# Patient Record
Sex: MALE | Race: WHITE | NOT HISPANIC OR LATINO | Employment: OTHER | URBAN - METROPOLITAN AREA
[De-identification: names, ages, dates, MRNs, and addresses within clinical notes are randomized per-mention and may not be internally consistent; named-entity substitution may affect disease eponyms.]

---

## 2017-12-03 ENCOUNTER — HOSPITAL ENCOUNTER (EMERGENCY)
Facility: HOSPITAL | Age: 82
Discharge: HOME | End: 2017-12-03
Attending: FAMILY MEDICINE | Admitting: FAMILY MEDICINE

## 2017-12-03 VITALS
WEIGHT: 155 LBS | DIASTOLIC BLOOD PRESSURE: 80 MMHG | SYSTOLIC BLOOD PRESSURE: 154 MMHG | HEIGHT: 69 IN | BODY MASS INDEX: 22.96 KG/M2 | RESPIRATION RATE: 20 BRPM | OXYGEN SATURATION: 98 % | TEMPERATURE: 98.1 F | HEART RATE: 87 BPM

## 2017-12-03 DIAGNOSIS — Z43.0 ATTENTION TO TRACHEOSTOMY TUBE (HCC): Primary | ICD-10-CM

## 2017-12-03 PROCEDURE — 99284 EMERGENCY DEPT VISIT MOD MDM: CPT

## 2017-12-03 PROCEDURE — 94799 UNLISTED PULMONARY SVC/PX: CPT

## 2017-12-03 RX ORDER — MIDODRINE HYDROCHLORIDE 5 MG/1
10 TABLET ORAL 3 TIMES DAILY
COMMUNITY
End: 2018-09-14

## 2017-12-03 RX ORDER — LEVETIRACETAM 750 MG/1
1500 TABLET ORAL 2 TIMES DAILY
COMMUNITY

## 2017-12-03 RX ORDER — LEVOFLOXACIN 750 MG/1
750 TABLET ORAL DAILY
COMMUNITY
End: 2017-12-30 | Stop reason: HOSPADM

## 2017-12-03 RX ORDER — LEVOTHYROXINE SODIUM 0.07 MG/1
75 TABLET ORAL DAILY
COMMUNITY

## 2017-12-03 RX ORDER — OXYBUTYNIN CHLORIDE 5 MG/1
5 TABLET ORAL 2 TIMES DAILY
COMMUNITY
End: 2018-09-14

## 2017-12-03 RX ORDER — CLINDAMYCIN HYDROCHLORIDE 150 MG/1
150 CAPSULE ORAL 3 TIMES DAILY
COMMUNITY
End: 2017-12-30 | Stop reason: HOSPADM

## 2017-12-03 RX ORDER — SODIUM PHOSPHATE, DIBASIC AND SODIUM PHOSPHATE, MONOBASIC 7; 19 G/133ML; G/133ML
ENEMA RECTAL ONCE
COMMUNITY
End: 2017-12-30 | Stop reason: HOSPADM

## 2017-12-03 RX ORDER — FINASTERIDE 5 MG/1
5 TABLET, FILM COATED ORAL DAILY
COMMUNITY

## 2017-12-03 RX ORDER — LACOSAMIDE 50 MG/1
100 TABLET ORAL EVERY 12 HOURS SCHEDULED
COMMUNITY

## 2017-12-03 RX ORDER — ATORVASTATIN CALCIUM 40 MG/1
40 TABLET, FILM COATED ORAL DAILY
COMMUNITY

## 2017-12-03 NOTE — ED PROVIDER NOTES
The patient presents from [facility] with complaints of SOA and removal of his endotracheal tube.     Physical Exam:  Pulmonary: Mild respiratory distress with good O2 saturation. The inner cannula of the pt's trach will not re-thread.    1409  Replaced the pt's ET tube. Cleared hairs that had been interfering with the pt's ET tube.      Plan to replace the pt's trach tube and d/c him to [facility].     I supervised care provided by the midlevel provider.  We have discussed this patient's history, physical exam, and treatment plan.  I have reviewed the note and personally saw and examined the patient and agree with the plan of care.    Documentation assistance provided by dirk Alexis for Dr. Menjivar.  Information recorded by the scribe was done at my direction and has been verified and validated by me.     Arron Alexis  12/03/17 2853       Mike Menjivar MD  12/03/17 9971

## 2017-12-03 NOTE — PROGRESS NOTES
Patients trach was changed to a new no. 6 uncuffed, un fenestrated et tube with DR Menjivar with out any problems, patient trach had a mucus blood clot. Shakira Schafer RRT

## 2017-12-03 NOTE — ED NOTES
Attempted to call report to [facility] x3. No answer. Message left. Pt has been D/C.      Cat Rollins RN  12/03/17 1649

## 2017-12-03 NOTE — ED PROVIDER NOTES
EMERGENCY DEPARTMENT ENCOUNTER    CHIEF COMPLAINT  Chief Complaint: tracheostomy problem  History given by: patient, EMS  History limited by: N/A  Room Number: 17/17  PMD: No Known Provider      HPI:  Pt is a 87 y.o. male who presents with tracheostomy problem. EMS reports that staff at [facility] did not have the proper inner cannula when they were attempting to clear pt's trach. Consequently, [facility] referred pt to ED for further evaluation. In ED, pt c/o mild dyspnea. He denies chest pain, abd pain, fevers, and N/V/D. At baseline, he is always on 6L supplemental O2. Past Medical History of HTN, CAD, atrial fibrillation, and diabetes.     Duration: today  Timing: constant  Location: respiratory   Radiation: none  Quality: none   Intensity/Severity: moderate  Progression: unchanged   Associated Symptoms: mild dyspnea  Aggravating Factors: none  Alleviating Factors: none  Previous Episodes: none mentioned   Treatment before arrival: none mentioned    PAST MEDICAL HISTORY  Active Ambulatory Problems     Diagnosis Date Noted   • No Active Ambulatory Problems     Resolved Ambulatory Problems     Diagnosis Date Noted   • No Resolved Ambulatory Problems     Past Medical History:   Diagnosis Date   • Atrial fibrillation    • BPH (benign prostatic hyperplasia)    • Constipation    • Coronary artery disease    • Diabetes mellitus    • Disease of thyroid gland    • Hyperlipidemia    • Hypertension    • MRSA infection    • Pain    • Pressure ulcer of sacral region    • Seizures        PAST SURGICAL HISTORY  History reviewed. No pertinent surgical history.    FAMILY HISTORY  History reviewed. No pertinent family history.    SOCIAL HISTORY  Social History     Social History   • Marital status:      Spouse name: N/A   • Number of children: N/A   • Years of education: N/A     Occupational History   • Not on file.     Social History Main Topics   • Smoking status: Former Smoker   • Smokeless tobacco: Not on file    • Alcohol use No   • Drug use: No   • Sexual activity: Not on file     Other Topics Concern   • Not on file     Social History Narrative   • No narrative on file         ALLERGIES  Review of patient's allergies indicates no known allergies.    REVIEW OF SYSTEMS  Review of Systems   Constitutional: Negative for chills and fever.   HENT: Negative for sore throat.    Respiratory: Positive for shortness of breath (mild).         Tracheostomy problem ([facility] does not have proper inner cannula)   Cardiovascular: Negative for chest pain.   Gastrointestinal: Negative for diarrhea, nausea and vomiting.   Genitourinary: Negative for difficulty urinating and dysuria.   Musculoskeletal: Negative for back pain.   Skin: Negative for rash.   Psychiatric/Behavioral: The patient is not nervous/anxious.        PHYSICAL EXAM  ED Triage Vitals   Temp Heart Rate Resp BP SpO2   12/03/17 1317 12/03/17 1317 12/03/17 1324 12/03/17 1317 12/03/17 1317   98.1 °F (36.7 °C) 90 20 154/80 95 % WNL       Physical Exam   Constitutional: He is oriented to person, place, and time. No distress.   Chronically ill appearing   HENT:   Head: Atraumatic.   Mouth/Throat: Mucous membranes are normal.   Eyes: No scleral icterus.   Neck: Normal range of motion.   Cardiovascular: Normal rate, regular rhythm and normal heart sounds.    Pulmonary/Chest: Effort normal. No respiratory distress. He has rhonchi.   Tracheostomy in place   Abdominal: Soft. There is no tenderness. There is no rebound and no guarding.   Musculoskeletal: Normal range of motion.   Neurological: He is alert and oriented to person, place, and time.   Skin: Skin is warm and dry.   Psychiatric: Mood and affect normal.   Nursing note and vitals reviewed.      PROGRESS AND CONSULTS  1:53 PM- Reviewed pt's history and workup with Dr. Menjivar.  At bedside evaluation, they agree with the plan of care.  1:57 PM- Dr Menjivar will replace pt's tracheostomy.   3:10 PM- Dr Menjivar has replaced pt's  "tracheostomy. See Dr Menjivar's note for further details.   3:12 PM- Reviewed implications of results, diagnosis, meds, responsibility to follow up, warning signs and symptoms of possible worsening, potential complications and reasons to return to ER with patient.  Discussed all results and noted any abnormalities with patient.  Discussed absolute need to recheck abnormalities and condition with PMD. Counseled pt on the importance of having his tracheostomy tube cleaned daily.   Discussed plan for discharge, as there is no emergent indication for admission.  Pt is agreeable and understands need for follow up and repeat testing.  Pt is aware that discharge does not mean that nothing is wrong but it indicates no emergency is present.  Pt is discharged with instructions to follow up with primary care doctor to have their blood pressure rechecked.       DIAGNOSIS  Final diagnoses:   Attention to tracheostomy tube with replacement       FOLLOW UP   your pmd    Call in 1 day          MEDICATIONS GIVEN IN ER  Medications - No data to display    /80  Pulse 87  Temp 98.1 °F (36.7 °C) (Oral)   Resp 20  Ht 69\" (175.3 cm)  Wt 155 lb (70.3 kg)  SpO2 98%  BMI 22.89 kg/m2      I personally reviewed the past medical history, past surgical history, social history, family history, current medications and allergies as they appear in this chart.  The scribe's note accurately reflects the work and decisions made by me.     Documentation assistance provided by dirk Castaneda for MARY Anaya on 12/3/2017 at 3:25 PM. Information recorded by the scribaltaf was done at my direction and has been verified and validated by me.     Syd Castaneda  12/03/17 1535       MARK Conrad  12/03/17 1655    "

## 2017-12-03 NOTE — ED TRIAGE NOTES
"Pt to Ed from [facility] with c/o mild SOA. Per EMS [facility] did not have the correct inner cannula when attempting to clear trach. Pt is AAox3 and in NAD. Resp even and unlabored. Skin PWD and intact. Resp even and unlabored. Pt wears O2 @ 6 LPM to reach \"therapeutic range.\" report to Cat GANDHI.  "

## 2017-12-03 NOTE — DISCHARGE INSTRUCTIONS
Continue current home medications  Clean trach daily-this is very important  Follow up with your pmd in 3-5 days   Return to er for fever, chills, shortness of air, chest pain, or any new or worsening symptoms

## 2017-12-28 ENCOUNTER — HOSPITAL ENCOUNTER (OUTPATIENT)
Facility: HOSPITAL | Age: 82
Setting detail: OBSERVATION
Discharge: NURSING, REHAB OR LONG-TERM CARE | End: 2017-12-30
Attending: EMERGENCY MEDICINE | Admitting: HOSPITALIST

## 2017-12-28 ENCOUNTER — APPOINTMENT (OUTPATIENT)
Dept: GENERAL RADIOLOGY | Facility: HOSPITAL | Age: 82
End: 2017-12-28

## 2017-12-28 ENCOUNTER — APPOINTMENT (OUTPATIENT)
Dept: CT IMAGING | Facility: HOSPITAL | Age: 82
End: 2017-12-28

## 2017-12-28 DIAGNOSIS — R53.1 GENERALIZED WEAKNESS: ICD-10-CM

## 2017-12-28 DIAGNOSIS — A04.72 C. DIFFICILE COLITIS: ICD-10-CM

## 2017-12-28 DIAGNOSIS — R07.9 CHEST PAIN, UNSPECIFIED TYPE: Primary | ICD-10-CM

## 2017-12-28 LAB
ALBUMIN SERPL-MCNC: 2.6 G/DL (ref 3.5–5.2)
ALBUMIN/GLOB SERPL: 0.6 G/DL
ALP SERPL-CCNC: 102 U/L (ref 39–117)
ALT SERPL W P-5'-P-CCNC: 55 U/L (ref 1–41)
ANION GAP SERPL CALCULATED.3IONS-SCNC: 9.8 MMOL/L
AST SERPL-CCNC: 45 U/L (ref 1–40)
BASOPHILS # BLD AUTO: 0.01 10*3/MM3 (ref 0–0.2)
BASOPHILS NFR BLD AUTO: 0.1 % (ref 0–1.5)
BILIRUB SERPL-MCNC: 0.2 MG/DL (ref 0.1–1.2)
BUN BLD-MCNC: 40 MG/DL (ref 8–23)
BUN/CREAT SERPL: 54.1 (ref 7–25)
CALCIUM SPEC-SCNC: 9.6 MG/DL (ref 8.6–10.5)
CHLORIDE SERPL-SCNC: 105 MMOL/L (ref 98–107)
CO2 SERPL-SCNC: 28.2 MMOL/L (ref 22–29)
CREAT BLD-MCNC: 0.74 MG/DL (ref 0.76–1.27)
DEPRECATED RDW RBC AUTO: 49.4 FL (ref 37–54)
EOSINOPHIL # BLD AUTO: 0.05 10*3/MM3 (ref 0–0.7)
EOSINOPHIL NFR BLD AUTO: 0.5 % (ref 0.3–6.2)
ERYTHROCYTE [DISTWIDTH] IN BLOOD BY AUTOMATED COUNT: 12.8 % (ref 11.5–14.5)
FLUAV AG NPH QL: NEGATIVE
FLUBV AG NPH QL IA: NEGATIVE
GFR SERPL CREATININE-BSD FRML MDRD: 100 ML/MIN/1.73
GLOBULIN UR ELPH-MCNC: 4.5 GM/DL
GLUCOSE BLD-MCNC: 166 MG/DL (ref 65–99)
HCT VFR BLD AUTO: 37.7 % (ref 40.4–52.2)
HGB BLD-MCNC: 11.9 G/DL (ref 13.7–17.6)
HOLD SPECIMEN: NORMAL
HOLD SPECIMEN: NORMAL
IMM GRANULOCYTES # BLD: 0.04 10*3/MM3 (ref 0–0.03)
IMM GRANULOCYTES NFR BLD: 0.4 % (ref 0–0.5)
LYMPHOCYTES # BLD AUTO: 1.65 10*3/MM3 (ref 0.9–4.8)
LYMPHOCYTES NFR BLD AUTO: 17.6 % (ref 19.6–45.3)
MCH RBC QN AUTO: 33 PG (ref 27–32.7)
MCHC RBC AUTO-ENTMCNC: 31.6 G/DL (ref 32.6–36.4)
MCV RBC AUTO: 104.4 FL (ref 79.8–96.2)
MONOCYTES # BLD AUTO: 0.65 10*3/MM3 (ref 0.2–1.2)
MONOCYTES NFR BLD AUTO: 7 % (ref 5–12)
NEUTROPHILS # BLD AUTO: 6.95 10*3/MM3 (ref 1.9–8.1)
NEUTROPHILS NFR BLD AUTO: 74.4 % (ref 42.7–76)
NRBC BLD MANUAL-RTO: 0 /100 WBC (ref 0–0)
PLATELET # BLD AUTO: 310 10*3/MM3 (ref 140–500)
PMV BLD AUTO: 11 FL (ref 6–12)
POTASSIUM BLD-SCNC: 4.4 MMOL/L (ref 3.5–5.2)
PROT SERPL-MCNC: 7.1 G/DL (ref 6–8.5)
RBC # BLD AUTO: 3.61 10*6/MM3 (ref 4.6–6)
SODIUM BLD-SCNC: 143 MMOL/L (ref 136–145)
TROPONIN T SERPL-MCNC: <0.01 NG/ML (ref 0–0.03)
TROPONIN T SERPL-MCNC: <0.01 NG/ML (ref 0–0.03)
WBC NRBC COR # BLD: 9.35 10*3/MM3 (ref 4.5–10.7)
WHOLE BLOOD HOLD SPECIMEN: NORMAL
WHOLE BLOOD HOLD SPECIMEN: NORMAL

## 2017-12-28 PROCEDURE — 71010 HC CHEST PA OR AP: CPT

## 2017-12-28 PROCEDURE — 93010 ELECTROCARDIOGRAM REPORT: CPT | Performed by: INTERNAL MEDICINE

## 2017-12-28 PROCEDURE — 84484 ASSAY OF TROPONIN QUANT: CPT | Performed by: EMERGENCY MEDICINE

## 2017-12-28 PROCEDURE — 80053 COMPREHEN METABOLIC PANEL: CPT | Performed by: EMERGENCY MEDICINE

## 2017-12-28 PROCEDURE — 84443 ASSAY THYROID STIM HORMONE: CPT | Performed by: INTERNAL MEDICINE

## 2017-12-28 PROCEDURE — 36415 COLL VENOUS BLD VENIPUNCTURE: CPT | Performed by: EMERGENCY MEDICINE

## 2017-12-28 PROCEDURE — G0378 HOSPITAL OBSERVATION PER HR: HCPCS

## 2017-12-28 PROCEDURE — 0 IOPAMIDOL PER 1 ML: Performed by: EMERGENCY MEDICINE

## 2017-12-28 PROCEDURE — 96360 HYDRATION IV INFUSION INIT: CPT

## 2017-12-28 PROCEDURE — 96361 HYDRATE IV INFUSION ADD-ON: CPT

## 2017-12-28 PROCEDURE — 99285 EMERGENCY DEPT VISIT HI MDM: CPT

## 2017-12-28 PROCEDURE — 84484 ASSAY OF TROPONIN QUANT: CPT | Performed by: PHYSICIAN ASSISTANT

## 2017-12-28 PROCEDURE — 71275 CT ANGIOGRAPHY CHEST: CPT

## 2017-12-28 PROCEDURE — 85025 COMPLETE CBC W/AUTO DIFF WBC: CPT | Performed by: EMERGENCY MEDICINE

## 2017-12-28 PROCEDURE — 93005 ELECTROCARDIOGRAM TRACING: CPT

## 2017-12-28 PROCEDURE — 87804 INFLUENZA ASSAY W/OPTIC: CPT | Performed by: PHYSICIAN ASSISTANT

## 2017-12-28 RX ORDER — SODIUM CHLORIDE 0.9 % (FLUSH) 0.9 %
10 SYRINGE (ML) INJECTION AS NEEDED
Status: DISCONTINUED | OUTPATIENT
Start: 2017-12-28 | End: 2017-12-30 | Stop reason: HOSPADM

## 2017-12-28 RX ADMIN — IOPAMIDOL 95 ML: 755 INJECTION, SOLUTION INTRAVENOUS at 23:10

## 2017-12-28 RX ADMIN — SODIUM CHLORIDE 500 ML: 9 INJECTION, SOLUTION INTRAVENOUS at 22:31

## 2017-12-29 ENCOUNTER — APPOINTMENT (OUTPATIENT)
Dept: NUCLEAR MEDICINE | Facility: HOSPITAL | Age: 82
End: 2017-12-29

## 2017-12-29 PROBLEM — L89.159 PRESSURE ULCER OF SACRAL REGION: Chronic | Status: ACTIVE | Noted: 2017-12-29

## 2017-12-29 PROBLEM — Z93.0 TRACHEOSTOMY IN PLACE (HCC): Chronic | Status: ACTIVE | Noted: 2017-12-29

## 2017-12-29 PROBLEM — E07.9 DISEASE OF THYROID GLAND: Chronic | Status: ACTIVE | Noted: 2017-12-29

## 2017-12-29 PROBLEM — M45.6 ANKYLOSING SPONDYLITIS LUMBAR REGION (HCC): Chronic | Status: ACTIVE | Noted: 2017-12-29

## 2017-12-29 PROBLEM — I25.10 CORONARY ARTERY DISEASE: Chronic | Status: ACTIVE | Noted: 2017-12-29

## 2017-12-29 PROBLEM — Z93.1 PEG (PERCUTANEOUS ENDOSCOPIC GASTROSTOMY) STATUS (HCC): Chronic | Status: ACTIVE | Noted: 2017-12-29

## 2017-12-29 PROBLEM — N31.9 NEUROGENIC BLADDER: Chronic | Status: ACTIVE | Noted: 2017-12-29

## 2017-12-29 PROBLEM — R07.9 CHEST PAIN: Status: ACTIVE | Noted: 2017-12-29

## 2017-12-29 PROBLEM — I10 HYPERTENSION: Chronic | Status: ACTIVE | Noted: 2017-12-29

## 2017-12-29 PROBLEM — A04.72 C. DIFFICILE DIARRHEA: Status: ACTIVE | Noted: 2017-12-29

## 2017-12-29 PROBLEM — I48.91 ATRIAL FIBRILLATION (HCC): Chronic | Status: ACTIVE | Noted: 2017-12-29

## 2017-12-29 PROBLEM — E11.9 DIABETES MELLITUS (HCC): Chronic | Status: ACTIVE | Noted: 2017-12-29

## 2017-12-29 LAB
ANION GAP SERPL CALCULATED.3IONS-SCNC: 9.2 MMOL/L
BUN BLD-MCNC: 34 MG/DL (ref 8–23)
BUN/CREAT SERPL: 51.5 (ref 7–25)
CALCIUM SPEC-SCNC: 9.2 MG/DL (ref 8.6–10.5)
CHLORIDE SERPL-SCNC: 106 MMOL/L (ref 98–107)
CO2 SERPL-SCNC: 26.8 MMOL/L (ref 22–29)
CREAT BLD-MCNC: 0.66 MG/DL (ref 0.76–1.27)
GFR SERPL CREATININE-BSD FRML MDRD: 114 ML/MIN/1.73
GLUCOSE BLD-MCNC: 153 MG/DL (ref 65–99)
GLUCOSE BLDC GLUCOMTR-MCNC: 170 MG/DL (ref 70–130)
GLUCOSE BLDC GLUCOMTR-MCNC: 178 MG/DL (ref 70–130)
POTASSIUM BLD-SCNC: 4 MMOL/L (ref 3.5–5.2)
SODIUM BLD-SCNC: 142 MMOL/L (ref 136–145)
TROPONIN T SERPL-MCNC: <0.01 NG/ML (ref 0–0.03)
TROPONIN T SERPL-MCNC: <0.01 NG/ML (ref 0–0.03)
TSH SERPL DL<=0.05 MIU/L-ACNC: 1.75 MIU/ML (ref 0.27–4.2)

## 2017-12-29 PROCEDURE — 93017 CV STRESS TEST TRACING ONLY: CPT

## 2017-12-29 PROCEDURE — 0 TECHNETIUM SESTAMIBI: Performed by: NURSE PRACTITIONER

## 2017-12-29 PROCEDURE — 80048 BASIC METABOLIC PNL TOTAL CA: CPT | Performed by: INTERNAL MEDICINE

## 2017-12-29 PROCEDURE — 78452 HT MUSCLE IMAGE SPECT MULT: CPT

## 2017-12-29 PROCEDURE — G0378 HOSPITAL OBSERVATION PER HR: HCPCS

## 2017-12-29 PROCEDURE — A9500 TC99M SESTAMIBI: HCPCS | Performed by: NURSE PRACTITIONER

## 2017-12-29 PROCEDURE — 84484 ASSAY OF TROPONIN QUANT: CPT | Performed by: INTERNAL MEDICINE

## 2017-12-29 PROCEDURE — 82962 GLUCOSE BLOOD TEST: CPT

## 2017-12-29 PROCEDURE — 93018 CV STRESS TEST I&R ONLY: CPT | Performed by: INTERNAL MEDICINE

## 2017-12-29 PROCEDURE — 94799 UNLISTED PULMONARY SVC/PX: CPT

## 2017-12-29 PROCEDURE — 78452 HT MUSCLE IMAGE SPECT MULT: CPT | Performed by: INTERNAL MEDICINE

## 2017-12-29 PROCEDURE — 63710000001 INSULIN ASPART PER 5 UNITS: Performed by: HOSPITALIST

## 2017-12-29 PROCEDURE — 94640 AIRWAY INHALATION TREATMENT: CPT

## 2017-12-29 PROCEDURE — 96361 HYDRATE IV INFUSION ADD-ON: CPT

## 2017-12-29 RX ORDER — ATORVASTATIN CALCIUM 20 MG/1
40 TABLET, FILM COATED ORAL DAILY
Status: DISCONTINUED | OUTPATIENT
Start: 2017-12-29 | End: 2017-12-30 | Stop reason: HOSPADM

## 2017-12-29 RX ORDER — MIDODRINE HYDROCHLORIDE 5 MG/1
10 TABLET ORAL 3 TIMES DAILY
Status: DISCONTINUED | OUTPATIENT
Start: 2017-12-29 | End: 2017-12-30 | Stop reason: HOSPADM

## 2017-12-29 RX ORDER — NICOTINE POLACRILEX 4 MG
15 LOZENGE BUCCAL
Status: DISCONTINUED | OUTPATIENT
Start: 2017-12-29 | End: 2017-12-30 | Stop reason: HOSPADM

## 2017-12-29 RX ORDER — ACETAMINOPHEN 325 MG/1
650 TABLET ORAL EVERY 4 HOURS PRN
Status: DISCONTINUED | OUTPATIENT
Start: 2017-12-29 | End: 2017-12-30 | Stop reason: HOSPADM

## 2017-12-29 RX ORDER — SODIUM CHLORIDE 0.9 % (FLUSH) 0.9 %
1-10 SYRINGE (ML) INJECTION AS NEEDED
Status: DISCONTINUED | OUTPATIENT
Start: 2017-12-29 | End: 2017-12-30 | Stop reason: HOSPADM

## 2017-12-29 RX ORDER — BISACODYL 5 MG/1
5 TABLET, DELAYED RELEASE ORAL DAILY PRN
Status: DISCONTINUED | OUTPATIENT
Start: 2017-12-29 | End: 2017-12-29

## 2017-12-29 RX ORDER — DEXTROSE MONOHYDRATE 25 G/50ML
25 INJECTION, SOLUTION INTRAVENOUS
Status: DISCONTINUED | OUTPATIENT
Start: 2017-12-29 | End: 2017-12-30 | Stop reason: HOSPADM

## 2017-12-29 RX ORDER — CALCIUM CARBONATE 200(500)MG
2 TABLET,CHEWABLE ORAL 2 TIMES DAILY PRN
Status: DISCONTINUED | OUTPATIENT
Start: 2017-12-29 | End: 2017-12-29

## 2017-12-29 RX ORDER — ONDANSETRON 4 MG/1
4 TABLET, ORALLY DISINTEGRATING ORAL EVERY 6 HOURS PRN
Status: DISCONTINUED | OUTPATIENT
Start: 2017-12-29 | End: 2017-12-30 | Stop reason: HOSPADM

## 2017-12-29 RX ORDER — LEVOTHYROXINE SODIUM 0.07 MG/1
75 TABLET ORAL DAILY
Status: DISCONTINUED | OUTPATIENT
Start: 2017-12-29 | End: 2017-12-30 | Stop reason: HOSPADM

## 2017-12-29 RX ORDER — SENNOSIDES 8.6 MG
1 TABLET ORAL 2 TIMES DAILY
Status: DISCONTINUED | OUTPATIENT
Start: 2017-12-29 | End: 2017-12-30 | Stop reason: HOSPADM

## 2017-12-29 RX ORDER — ONDANSETRON 4 MG/1
4 TABLET, FILM COATED ORAL EVERY 6 HOURS PRN
Status: DISCONTINUED | OUTPATIENT
Start: 2017-12-29 | End: 2017-12-30 | Stop reason: HOSPADM

## 2017-12-29 RX ORDER — NITROGLYCERIN 0.4 MG/1
0.4 TABLET SUBLINGUAL
Status: DISCONTINUED | OUTPATIENT
Start: 2017-12-29 | End: 2017-12-30 | Stop reason: HOSPADM

## 2017-12-29 RX ORDER — OXYBUTYNIN CHLORIDE 5 MG/1
5 TABLET ORAL 2 TIMES DAILY
Status: DISCONTINUED | OUTPATIENT
Start: 2017-12-29 | End: 2017-12-30 | Stop reason: HOSPADM

## 2017-12-29 RX ORDER — CHOLECALCIFEROL (VITAMIN D3) 125 MCG
5 CAPSULE ORAL NIGHTLY PRN
Status: DISCONTINUED | OUTPATIENT
Start: 2017-12-29 | End: 2017-12-30 | Stop reason: HOSPADM

## 2017-12-29 RX ORDER — BUDESONIDE 0.5 MG/2ML
0.5 INHALANT ORAL
COMMUNITY

## 2017-12-29 RX ORDER — CHOLECALCIFEROL (VITAMIN D3) 125 MCG
5 CAPSULE ORAL NIGHTLY PRN
COMMUNITY
End: 2018-09-14

## 2017-12-29 RX ORDER — ONDANSETRON 2 MG/ML
4 INJECTION INTRAMUSCULAR; INTRAVENOUS EVERY 6 HOURS PRN
Status: DISCONTINUED | OUTPATIENT
Start: 2017-12-29 | End: 2017-12-30 | Stop reason: HOSPADM

## 2017-12-29 RX ORDER — SODIUM CHLORIDE 9 MG/ML
75 INJECTION, SOLUTION INTRAVENOUS CONTINUOUS
Status: DISCONTINUED | OUTPATIENT
Start: 2017-12-29 | End: 2017-12-30 | Stop reason: HOSPADM

## 2017-12-29 RX ORDER — BUDESONIDE 0.5 MG/2ML
0.5 INHALANT ORAL
Status: DISCONTINUED | OUTPATIENT
Start: 2017-12-29 | End: 2017-12-30 | Stop reason: HOSPADM

## 2017-12-29 RX ORDER — LACOSAMIDE 50 MG/1
75 TABLET ORAL EVERY 12 HOURS SCHEDULED
Status: DISCONTINUED | OUTPATIENT
Start: 2017-12-29 | End: 2017-12-30 | Stop reason: HOSPADM

## 2017-12-29 RX ORDER — FINASTERIDE 5 MG/1
5 TABLET, FILM COATED ORAL DAILY
Status: DISCONTINUED | OUTPATIENT
Start: 2017-12-29 | End: 2017-12-30 | Stop reason: HOSPADM

## 2017-12-29 RX ADMIN — LINAGLIPTIN 5 MG: 5 TABLET, FILM COATED ORAL at 15:05

## 2017-12-29 RX ADMIN — LACOSAMIDE 75 MG: 50 TABLET, FILM COATED ORAL at 17:37

## 2017-12-29 RX ADMIN — BUDESONIDE 0.5 MG: 0.5 INHALANT RESPIRATORY (INHALATION) at 19:30

## 2017-12-29 RX ADMIN — INSULIN ASPART 2 UNITS: 100 INJECTION, SOLUTION INTRAVENOUS; SUBCUTANEOUS at 17:37

## 2017-12-29 RX ADMIN — OXYBUTYNIN CHLORIDE 5 MG: 5 TABLET ORAL at 22:44

## 2017-12-29 RX ADMIN — SODIUM CHLORIDE 75 ML/HR: 9 INJECTION, SOLUTION INTRAVENOUS at 18:23

## 2017-12-29 RX ADMIN — SENNA 8.6 MG: 8.6 TABLET, COATED ORAL at 22:44

## 2017-12-29 RX ADMIN — MIDODRINE HYDROCHLORIDE 10 MG: 5 TABLET ORAL at 22:45

## 2017-12-29 RX ADMIN — LEVOTHYROXINE SODIUM 75 MCG: 75 TABLET ORAL at 15:05

## 2017-12-29 RX ADMIN — SODIUM CHLORIDE 75 ML/HR: 9 INJECTION, SOLUTION INTRAVENOUS at 05:30

## 2017-12-29 RX ADMIN — FINASTERIDE 5 MG: 5 TABLET, FILM COATED ORAL at 15:05

## 2017-12-29 RX ADMIN — LEVETIRACETAM 750 MG: 500 TABLET, FILM COATED ORAL at 15:05

## 2017-12-29 RX ADMIN — VANCOMYCIN 125 MG: KIT at 15:05

## 2017-12-29 RX ADMIN — SENNA 8.6 MG: 8.6 TABLET, COATED ORAL at 15:05

## 2017-12-29 RX ADMIN — ALBUTEROL SULFATE 5 MG: 2.5 SOLUTION RESPIRATORY (INHALATION) at 19:29

## 2017-12-29 RX ADMIN — APIXABAN 5 MG: 2.5 TABLET, FILM COATED ORAL at 15:05

## 2017-12-29 RX ADMIN — OXYBUTYNIN CHLORIDE 5 MG: 5 TABLET ORAL at 15:05

## 2017-12-29 RX ADMIN — TECHNETIUM TC-99M SESTAMIBI 1 DOSE: 1 INJECTION INTRAVENOUS at 08:50

## 2017-12-29 RX ADMIN — LEVETIRACETAM 750 MG: 500 TABLET, FILM COATED ORAL at 22:43

## 2017-12-29 RX ADMIN — MIDODRINE HYDROCHLORIDE 10 MG: 5 TABLET ORAL at 17:37

## 2017-12-29 RX ADMIN — INSULIN ASPART 2 UNITS: 100 INJECTION, SOLUTION INTRAVENOUS; SUBCUTANEOUS at 22:44

## 2017-12-29 RX ADMIN — ATORVASTATIN CALCIUM 40 MG: 20 TABLET, FILM COATED ORAL at 15:05

## 2017-12-30 VITALS
BODY MASS INDEX: 22.57 KG/M2 | WEIGHT: 152.4 LBS | HEIGHT: 69 IN | TEMPERATURE: 97.9 F | SYSTOLIC BLOOD PRESSURE: 135 MMHG | OXYGEN SATURATION: 97 % | RESPIRATION RATE: 18 BRPM | DIASTOLIC BLOOD PRESSURE: 78 MMHG | HEART RATE: 69 BPM

## 2017-12-30 LAB
ALBUMIN SERPL-MCNC: 2.4 G/DL (ref 3.5–5.2)
ALBUMIN/GLOB SERPL: 0.6 G/DL
ALP SERPL-CCNC: 81 U/L (ref 39–117)
ALT SERPL W P-5'-P-CCNC: 47 U/L (ref 1–41)
ANION GAP SERPL CALCULATED.3IONS-SCNC: 11.3 MMOL/L
AST SERPL-CCNC: 38 U/L (ref 1–40)
BASOPHILS # BLD AUTO: 0.02 10*3/MM3 (ref 0–0.2)
BASOPHILS NFR BLD AUTO: 0.2 % (ref 0–1.5)
BILIRUB SERPL-MCNC: 0.2 MG/DL (ref 0.1–1.2)
BUN BLD-MCNC: 29 MG/DL (ref 8–23)
BUN/CREAT SERPL: 40.8 (ref 7–25)
CALCIUM SPEC-SCNC: 8.6 MG/DL (ref 8.6–10.5)
CHLORIDE SERPL-SCNC: 108 MMOL/L (ref 98–107)
CO2 SERPL-SCNC: 26.7 MMOL/L (ref 22–29)
CREAT BLD-MCNC: 0.71 MG/DL (ref 0.76–1.27)
DEPRECATED RDW RBC AUTO: 51.8 FL (ref 37–54)
EOSINOPHIL # BLD AUTO: 0.13 10*3/MM3 (ref 0–0.7)
EOSINOPHIL NFR BLD AUTO: 1.5 % (ref 0.3–6.2)
ERYTHROCYTE [DISTWIDTH] IN BLOOD BY AUTOMATED COUNT: 13.1 % (ref 11.5–14.5)
GFR SERPL CREATININE-BSD FRML MDRD: 105 ML/MIN/1.73
GLOBULIN UR ELPH-MCNC: 3.8 GM/DL
GLUCOSE BLD-MCNC: 191 MG/DL (ref 65–99)
GLUCOSE BLDC GLUCOMTR-MCNC: 198 MG/DL (ref 70–130)
GLUCOSE BLDC GLUCOMTR-MCNC: 217 MG/DL (ref 70–130)
HBA1C MFR BLD: 7 % (ref 4.8–5.6)
HCT VFR BLD AUTO: 34 % (ref 40.4–52.2)
HGB BLD-MCNC: 10.7 G/DL (ref 13.7–17.6)
IMM GRANULOCYTES # BLD: 0.04 10*3/MM3 (ref 0–0.03)
IMM GRANULOCYTES NFR BLD: 0.5 % (ref 0–0.5)
LYMPHOCYTES # BLD AUTO: 1.98 10*3/MM3 (ref 0.9–4.8)
LYMPHOCYTES NFR BLD AUTO: 23.2 % (ref 19.6–45.3)
MACROCYTES BLD QL SMEAR: NORMAL
MCH RBC QN AUTO: 33.5 PG (ref 27–32.7)
MCHC RBC AUTO-ENTMCNC: 31.5 G/DL (ref 32.6–36.4)
MCV RBC AUTO: 106.6 FL (ref 79.8–96.2)
MONOCYTES # BLD AUTO: 0.49 10*3/MM3 (ref 0.2–1.2)
MONOCYTES NFR BLD AUTO: 5.8 % (ref 5–12)
NEUTROPHILS # BLD AUTO: 5.86 10*3/MM3 (ref 1.9–8.1)
NEUTROPHILS NFR BLD AUTO: 68.8 % (ref 42.7–76)
PLAT MORPH BLD: NORMAL
PLATELET # BLD AUTO: 280 10*3/MM3 (ref 140–500)
PMV BLD AUTO: 10.7 FL (ref 6–12)
POTASSIUM BLD-SCNC: 3.8 MMOL/L (ref 3.5–5.2)
PROT SERPL-MCNC: 6.2 G/DL (ref 6–8.5)
RBC # BLD AUTO: 3.19 10*6/MM3 (ref 4.6–6)
SODIUM BLD-SCNC: 146 MMOL/L (ref 136–145)
STOMATOCYTES BLD QL SMEAR: NORMAL
TOXIC GRANULATION: NORMAL
WBC NRBC COR # BLD: 8.52 10*3/MM3 (ref 4.5–10.7)

## 2017-12-30 PROCEDURE — 83036 HEMOGLOBIN GLYCOSYLATED A1C: CPT | Performed by: HOSPITALIST

## 2017-12-30 PROCEDURE — 99212 OFFICE O/P EST SF 10 MIN: CPT | Performed by: NURSE PRACTITIONER

## 2017-12-30 PROCEDURE — 85025 COMPLETE CBC W/AUTO DIFF WBC: CPT | Performed by: HOSPITALIST

## 2017-12-30 PROCEDURE — 80053 COMPREHEN METABOLIC PANEL: CPT | Performed by: HOSPITALIST

## 2017-12-30 PROCEDURE — G0378 HOSPITAL OBSERVATION PER HR: HCPCS

## 2017-12-30 PROCEDURE — 85007 BL SMEAR W/DIFF WBC COUNT: CPT | Performed by: HOSPITALIST

## 2017-12-30 PROCEDURE — 94799 UNLISTED PULMONARY SVC/PX: CPT

## 2017-12-30 PROCEDURE — 82962 GLUCOSE BLOOD TEST: CPT

## 2017-12-30 PROCEDURE — 63710000001 INSULIN ASPART PER 5 UNITS: Performed by: HOSPITALIST

## 2017-12-30 RX ADMIN — OXYBUTYNIN CHLORIDE 5 MG: 5 TABLET ORAL at 08:25

## 2017-12-30 RX ADMIN — VANCOMYCIN 125 MG: KIT at 01:21

## 2017-12-30 RX ADMIN — FINASTERIDE 5 MG: 5 TABLET, FILM COATED ORAL at 08:25

## 2017-12-30 RX ADMIN — LINAGLIPTIN 5 MG: 5 TABLET, FILM COATED ORAL at 08:25

## 2017-12-30 RX ADMIN — ALBUTEROL SULFATE 5 MG: 2.5 SOLUTION RESPIRATORY (INHALATION) at 11:20

## 2017-12-30 RX ADMIN — LACOSAMIDE 75 MG: 50 TABLET, FILM COATED ORAL at 06:00

## 2017-12-30 RX ADMIN — LEVETIRACETAM 750 MG: 500 TABLET, FILM COATED ORAL at 08:25

## 2017-12-30 RX ADMIN — SENNA 8.6 MG: 8.6 TABLET, COATED ORAL at 08:25

## 2017-12-30 RX ADMIN — APIXABAN 5 MG: 2.5 TABLET, FILM COATED ORAL at 01:22

## 2017-12-30 RX ADMIN — INSULIN ASPART 2 UNITS: 100 INJECTION, SOLUTION INTRAVENOUS; SUBCUTANEOUS at 06:49

## 2017-12-30 RX ADMIN — VANCOMYCIN 125 MG: KIT at 06:02

## 2017-12-30 RX ADMIN — ALBUTEROL SULFATE 2.5 MG: 2.5 SOLUTION RESPIRATORY (INHALATION) at 05:11

## 2017-12-30 RX ADMIN — MIDODRINE HYDROCHLORIDE 10 MG: 5 TABLET ORAL at 08:25

## 2017-12-30 RX ADMIN — Medication 5 MG: at 01:22

## 2017-12-30 RX ADMIN — BUDESONIDE 0.5 MG: 0.5 INHALANT RESPIRATORY (INHALATION) at 11:20

## 2017-12-30 RX ADMIN — NYSTATIN 100000 UNITS: 100000 SUSPENSION ORAL at 01:22

## 2017-12-30 RX ADMIN — ATORVASTATIN CALCIUM 40 MG: 20 TABLET, FILM COATED ORAL at 08:25

## 2017-12-30 RX ADMIN — LEVOTHYROXINE SODIUM 75 MCG: 75 TABLET ORAL at 08:25

## 2018-01-01 NOTE — PROGRESS NOTES
Case Management Discharge Note    Final Note: Discharged  12/30/17 to [facility]- skilled level    Discharge Placement     Facility/Agency Request Status Selected? Address Phone Number Fax Number    [facility] Accepted    Yes [address] [facility] [address] [phone]        Ambulance: [facility]    Discharge Codes: 03  Discharged/transferred to skilled nursing facility (SNF) with Medicare certification in anticipation of skilled care

## 2018-01-04 LAB
BH CV STRESS COMMENTS STAGE 1: NORMAL
BH CV STRESS DOSE REGADENOSON STAGE 1: 0.4
BH CV STRESS DURATION MIN STAGE 1: 0
BH CV STRESS DURATION SEC STAGE 1: 15
BH CV STRESS PROTOCOL 1: NORMAL
BH CV STRESS RECOVERY BP: NORMAL MMHG
BH CV STRESS RECOVERY HR: 107 BPM
BH CV STRESS STAGE 1: 1
LV EF NUC BP: 64 %
MAXIMAL PREDICTED HEART RATE: 133 BPM
PERCENT MAX PREDICTED HR: 82.71 %
STRESS BASELINE BP: NORMAL MMHG
STRESS BASELINE HR: 95 BPM
STRESS PERCENT HR: 97 %
STRESS POST PEAK BP: NORMAL MMHG
STRESS POST PEAK HR: 110 BPM
STRESS TARGET HR: 113 BPM

## 2018-01-17 ENCOUNTER — HOSPITAL ENCOUNTER (EMERGENCY)
Facility: HOSPITAL | Age: 83
Discharge: HOME | End: 2018-01-17
Attending: EMERGENCY MEDICINE | Admitting: EMERGENCY MEDICINE

## 2018-01-17 ENCOUNTER — APPOINTMENT (OUTPATIENT)
Dept: CT IMAGING | Facility: HOSPITAL | Age: 83
End: 2018-01-17

## 2018-01-17 ENCOUNTER — APPOINTMENT (OUTPATIENT)
Dept: GENERAL RADIOLOGY | Facility: HOSPITAL | Age: 83
End: 2018-01-17

## 2018-01-17 VITALS
SYSTOLIC BLOOD PRESSURE: 149 MMHG | HEART RATE: 96 BPM | BODY MASS INDEX: 24.44 KG/M2 | OXYGEN SATURATION: 95 % | RESPIRATION RATE: 16 BRPM | TEMPERATURE: 98.4 F | WEIGHT: 165 LBS | HEIGHT: 69 IN | DIASTOLIC BLOOD PRESSURE: 84 MMHG

## 2018-01-17 DIAGNOSIS — R45.1 AGITATION: ICD-10-CM

## 2018-01-17 DIAGNOSIS — N39.0 ACUTE UTI: Primary | ICD-10-CM

## 2018-01-17 LAB
ALBUMIN SERPL-MCNC: 3.6 G/DL (ref 3.5–5.2)
ALBUMIN/GLOB SERPL: 0.7 G/DL
ALP SERPL-CCNC: 119 U/L (ref 39–117)
ALT SERPL W P-5'-P-CCNC: 31 U/L (ref 1–41)
ANION GAP SERPL CALCULATED.3IONS-SCNC: 9.3 MMOL/L
AST SERPL-CCNC: 32 U/L (ref 1–40)
BACTERIA UR QL AUTO: ABNORMAL /HPF
BASOPHILS # BLD AUTO: 0.01 10*3/MM3 (ref 0–0.2)
BASOPHILS NFR BLD AUTO: 0.1 % (ref 0–1.5)
BILIRUB SERPL-MCNC: 0.3 MG/DL (ref 0.1–1.2)
BILIRUB UR QL STRIP: NEGATIVE
BUN BLD-MCNC: 29 MG/DL (ref 8–23)
BUN/CREAT SERPL: 35.4 (ref 7–25)
CALCIUM SPEC-SCNC: 9.7 MG/DL (ref 8.6–10.5)
CHLORIDE SERPL-SCNC: 99 MMOL/L (ref 98–107)
CLARITY UR: ABNORMAL
CO2 SERPL-SCNC: 32.7 MMOL/L (ref 22–29)
COLOR UR: YELLOW
CREAT BLD-MCNC: 0.82 MG/DL (ref 0.76–1.27)
DEPRECATED RDW RBC AUTO: 51.8 FL (ref 37–54)
EOSINOPHIL # BLD AUTO: 0.11 10*3/MM3 (ref 0–0.7)
EOSINOPHIL NFR BLD AUTO: 1.3 % (ref 0.3–6.2)
ERYTHROCYTE [DISTWIDTH] IN BLOOD BY AUTOMATED COUNT: 13.6 % (ref 11.5–14.5)
GFR SERPL CREATININE-BSD FRML MDRD: 89 ML/MIN/1.73
GLOBULIN UR ELPH-MCNC: 5 GM/DL
GLUCOSE BLD-MCNC: 126 MG/DL (ref 65–99)
GLUCOSE UR STRIP-MCNC: NEGATIVE MG/DL
HCT VFR BLD AUTO: 39.2 % (ref 40.4–52.2)
HGB BLD-MCNC: 12.5 G/DL (ref 13.7–17.6)
HGB UR QL STRIP.AUTO: ABNORMAL
HOLD SPECIMEN: NORMAL
HOLD SPECIMEN: NORMAL
HYALINE CASTS UR QL AUTO: ABNORMAL /LPF
IMM GRANULOCYTES # BLD: 0.03 10*3/MM3 (ref 0–0.03)
IMM GRANULOCYTES NFR BLD: 0.3 % (ref 0–0.5)
KETONES UR QL STRIP: NEGATIVE
LEUKOCYTE ESTERASE UR QL STRIP.AUTO: ABNORMAL
LYMPHOCYTES # BLD AUTO: 1.56 10*3/MM3 (ref 0.9–4.8)
LYMPHOCYTES NFR BLD AUTO: 17.8 % (ref 19.6–45.3)
MAGNESIUM SERPL-MCNC: 2.4 MG/DL (ref 1.6–2.4)
MCH RBC QN AUTO: 33.4 PG (ref 27–32.7)
MCHC RBC AUTO-ENTMCNC: 31.9 G/DL (ref 32.6–36.4)
MCV RBC AUTO: 104.8 FL (ref 79.8–96.2)
MONOCYTES # BLD AUTO: 0.52 10*3/MM3 (ref 0.2–1.2)
MONOCYTES NFR BLD AUTO: 5.9 % (ref 5–12)
NEUTROPHILS # BLD AUTO: 6.52 10*3/MM3 (ref 1.9–8.1)
NEUTROPHILS NFR BLD AUTO: 74.6 % (ref 42.7–76)
NITRITE UR QL STRIP: NEGATIVE
PH UR STRIP.AUTO: 8 [PH] (ref 5–8)
PLATELET # BLD AUTO: 234 10*3/MM3 (ref 140–500)
PMV BLD AUTO: 11.2 FL (ref 6–12)
POTASSIUM BLD-SCNC: 5.1 MMOL/L (ref 3.5–5.2)
PROT SERPL-MCNC: 8.6 G/DL (ref 6–8.5)
PROT UR QL STRIP: ABNORMAL
RBC # BLD AUTO: 3.74 10*6/MM3 (ref 4.6–6)
RBC # UR: ABNORMAL /HPF
REF LAB TEST METHOD: ABNORMAL
SODIUM BLD-SCNC: 141 MMOL/L (ref 136–145)
SP GR UR STRIP: 1.01 (ref 1–1.03)
SQUAMOUS #/AREA URNS HPF: ABNORMAL /HPF
TROPONIN T SERPL-MCNC: <0.01 NG/ML (ref 0–0.03)
UROBILINOGEN UR QL STRIP: ABNORMAL
WBC NRBC COR # BLD: 8.75 10*3/MM3 (ref 4.5–10.7)
WBC UR QL AUTO: ABNORMAL /HPF
WHOLE BLOOD HOLD SPECIMEN: NORMAL
WHOLE BLOOD HOLD SPECIMEN: NORMAL

## 2018-01-17 PROCEDURE — 81001 URINALYSIS AUTO W/SCOPE: CPT | Performed by: EMERGENCY MEDICINE

## 2018-01-17 PROCEDURE — 93005 ELECTROCARDIOGRAM TRACING: CPT

## 2018-01-17 PROCEDURE — 80053 COMPREHEN METABOLIC PANEL: CPT | Performed by: EMERGENCY MEDICINE

## 2018-01-17 PROCEDURE — 70450 CT HEAD/BRAIN W/O DYE: CPT

## 2018-01-17 PROCEDURE — 85025 COMPLETE CBC W/AUTO DIFF WBC: CPT | Performed by: EMERGENCY MEDICINE

## 2018-01-17 PROCEDURE — 93010 ELECTROCARDIOGRAM REPORT: CPT | Performed by: INTERNAL MEDICINE

## 2018-01-17 PROCEDURE — 87086 URINE CULTURE/COLONY COUNT: CPT | Performed by: EMERGENCY MEDICINE

## 2018-01-17 PROCEDURE — 84484 ASSAY OF TROPONIN QUANT: CPT | Performed by: EMERGENCY MEDICINE

## 2018-01-17 PROCEDURE — 99285 EMERGENCY DEPT VISIT HI MDM: CPT

## 2018-01-17 PROCEDURE — 83735 ASSAY OF MAGNESIUM: CPT | Performed by: EMERGENCY MEDICINE

## 2018-01-17 PROCEDURE — 71045 X-RAY EXAM CHEST 1 VIEW: CPT

## 2018-01-17 RX ORDER — SODIUM CHLORIDE 0.9 % (FLUSH) 0.9 %
10 SYRINGE (ML) INJECTION AS NEEDED
Status: DISCONTINUED | OUTPATIENT
Start: 2018-01-17 | End: 2018-01-17 | Stop reason: HOSPADM

## 2018-01-17 RX ORDER — CEPHALEXIN 500 MG/1
500 CAPSULE ORAL 3 TIMES DAILY
Qty: 21 CAPSULE | Refills: 0 | Status: SHIPPED | OUTPATIENT
Start: 2018-01-17 | End: 2018-01-25 | Stop reason: HOSPADM

## 2018-01-19 ENCOUNTER — APPOINTMENT (OUTPATIENT)
Dept: CT IMAGING | Facility: HOSPITAL | Age: 83
End: 2018-01-19

## 2018-01-19 ENCOUNTER — HOSPITAL ENCOUNTER (INPATIENT)
Facility: HOSPITAL | Age: 83
LOS: 6 days | Discharge: NURSING, REHAB OR LONG-TERM CARE | End: 2018-01-25
Attending: EMERGENCY MEDICINE | Admitting: INTERNAL MEDICINE

## 2018-01-19 ENCOUNTER — APPOINTMENT (OUTPATIENT)
Dept: GENERAL RADIOLOGY | Facility: HOSPITAL | Age: 83
End: 2018-01-19

## 2018-01-19 DIAGNOSIS — A41.9 SEPSIS, DUE TO UNSPECIFIED ORGANISM: ICD-10-CM

## 2018-01-19 DIAGNOSIS — R26.89 DECREASED MOBILITY: ICD-10-CM

## 2018-01-19 DIAGNOSIS — N39.0 ACUTE UTI: Primary | ICD-10-CM

## 2018-01-19 LAB
ALBUMIN SERPL-MCNC: 3.7 G/DL (ref 3.5–5.2)
ALBUMIN/GLOB SERPL: 0.7 G/DL
ALP SERPL-CCNC: 125 U/L (ref 39–117)
ALT SERPL W P-5'-P-CCNC: 34 U/L (ref 1–41)
ANION GAP SERPL CALCULATED.3IONS-SCNC: 10.7 MMOL/L
ARTERIAL PATENCY WRIST A: ABNORMAL
AST SERPL-CCNC: 33 U/L (ref 1–40)
ATMOSPHERIC PRESS: 755.3 MMHG
BACTERIA SPEC AEROBE CULT: ABNORMAL
BACTERIA SPEC AEROBE CULT: ABNORMAL
BACTERIA UR QL AUTO: ABNORMAL /HPF
BASE EXCESS BLDA CALC-SCNC: 3.8 MMOL/L (ref 0–2)
BASOPHILS # BLD AUTO: 0.01 10*3/MM3 (ref 0–0.2)
BASOPHILS NFR BLD AUTO: 0.1 % (ref 0–1.5)
BDY SITE: ABNORMAL
BILIRUB SERPL-MCNC: 0.4 MG/DL (ref 0.1–1.2)
BILIRUB UR QL STRIP: NEGATIVE
BUN BLD-MCNC: 37 MG/DL (ref 8–23)
BUN/CREAT SERPL: 34.6 (ref 7–25)
CALCIUM SPEC-SCNC: 9.8 MG/DL (ref 8.6–10.5)
CHLORIDE SERPL-SCNC: 95 MMOL/L (ref 98–107)
CLARITY UR: ABNORMAL
CO2 SERPL-SCNC: 30.3 MMOL/L (ref 22–29)
COLOR UR: YELLOW
CORTIS SERPL-MCNC: 43.86 MCG/DL
CREAT BLD-MCNC: 1.07 MG/DL (ref 0.76–1.27)
D-LACTATE SERPL-SCNC: 1 MMOL/L (ref 0.5–2)
D-LACTATE SERPL-SCNC: 2.5 MMOL/L (ref 0.5–2)
D-LACTATE SERPL-SCNC: 2.7 MMOL/L (ref 0.5–2)
DEPRECATED RDW RBC AUTO: 52.8 FL (ref 37–54)
EOSINOPHIL # BLD AUTO: 0.02 10*3/MM3 (ref 0–0.7)
EOSINOPHIL NFR BLD AUTO: 0.1 % (ref 0.3–6.2)
ERYTHROCYTE [DISTWIDTH] IN BLOOD BY AUTOMATED COUNT: 13.7 % (ref 11.5–14.5)
GFR SERPL CREATININE-BSD FRML MDRD: 65 ML/MIN/1.73
GLOBULIN UR ELPH-MCNC: 5.3 GM/DL
GLUCOSE BLD-MCNC: 233 MG/DL (ref 65–99)
GLUCOSE BLDC GLUCOMTR-MCNC: 128 MG/DL (ref 70–130)
GLUCOSE BLDC GLUCOMTR-MCNC: 165 MG/DL (ref 70–130)
GLUCOSE BLDC GLUCOMTR-MCNC: 191 MG/DL (ref 70–130)
GLUCOSE UR STRIP-MCNC: NEGATIVE MG/DL
HCO3 BLDA-SCNC: 28.6 MMOL/L (ref 22–28)
HCT VFR BLD AUTO: 42 % (ref 40.4–52.2)
HGB BLD-MCNC: 13.4 G/DL (ref 13.7–17.6)
HGB UR QL STRIP.AUTO: ABNORMAL
HOLD SPECIMEN: NORMAL
HOROWITZ INDEX BLD+IHG-RTO: 35 %
HYALINE CASTS UR QL AUTO: ABNORMAL /LPF
IMM GRANULOCYTES # BLD: 0.05 10*3/MM3 (ref 0–0.03)
IMM GRANULOCYTES NFR BLD: 0.3 % (ref 0–0.5)
INR PPP: 1.12 (ref 0.9–1.1)
KETONES UR QL STRIP: NEGATIVE
LEUKOCYTE ESTERASE UR QL STRIP.AUTO: ABNORMAL
LYMPHOCYTES # BLD AUTO: 0.85 10*3/MM3 (ref 0.9–4.8)
LYMPHOCYTES NFR BLD AUTO: 4.6 % (ref 19.6–45.3)
MCH RBC QN AUTO: 33.8 PG (ref 27–32.7)
MCHC RBC AUTO-ENTMCNC: 31.9 G/DL (ref 32.6–36.4)
MCV RBC AUTO: 105.8 FL (ref 79.8–96.2)
MODALITY: ABNORMAL
MONOCYTES # BLD AUTO: 1.1 10*3/MM3 (ref 0.2–1.2)
MONOCYTES NFR BLD AUTO: 6 % (ref 5–12)
NEUTROPHILS # BLD AUTO: 16.27 10*3/MM3 (ref 1.9–8.1)
NEUTROPHILS NFR BLD AUTO: 88.9 % (ref 42.7–76)
NITRITE UR QL STRIP: NEGATIVE
NT-PROBNP SERPL-MCNC: 343.6 PG/ML (ref 0–1800)
O2 A-A PPRESDIFF RESPIRATORY: 0.3 MMHG
PCO2 BLDA: 43.1 MM HG (ref 35–45)
PH BLDA: 7.43 PH UNITS (ref 7.35–7.45)
PH UR STRIP.AUTO: <=5 [PH] (ref 5–8)
PLATELET # BLD AUTO: 243 10*3/MM3 (ref 140–500)
PMV BLD AUTO: 11.7 FL (ref 6–12)
PO2 BLDA: 63.5 MM HG (ref 80–100)
POTASSIUM BLD-SCNC: 4.4 MMOL/L (ref 3.5–5.2)
PROCALCITONIN SERPL-MCNC: 0.18 NG/ML (ref 0.1–0.25)
PROCALCITONIN SERPL-MCNC: 0.39 NG/ML (ref 0.1–0.25)
PROT SERPL-MCNC: 9 G/DL (ref 6–8.5)
PROT UR QL STRIP: ABNORMAL
PROTHROMBIN TIME: 14 SECONDS (ref 11.7–14.2)
RBC # BLD AUTO: 3.97 10*6/MM3 (ref 4.6–6)
RBC # UR: ABNORMAL /HPF
REF LAB TEST METHOD: ABNORMAL
SAO2 % BLDCOA: 92.4 % (ref 92–99)
SODIUM BLD-SCNC: 136 MMOL/L (ref 136–145)
SP GR UR STRIP: 1.02 (ref 1–1.03)
SQUAMOUS #/AREA URNS HPF: ABNORMAL /HPF
TOTAL RATE: 36 BREATHS/MINUTE
TROPONIN T SERPL-MCNC: <0.01 NG/ML (ref 0–0.03)
UROBILINOGEN UR QL STRIP: ABNORMAL
WBC CASTS #/AREA URNS LPF: ABNORMAL /LPF
WBC NRBC COR # BLD: 18.3 10*3/MM3 (ref 4.5–10.7)
WBC UR QL AUTO: ABNORMAL /HPF
YEAST URNS QL MICRO: ABNORMAL /HPF

## 2018-01-19 PROCEDURE — 63710000001 INSULIN ASPART PER 5 UNITS: Performed by: INTERNAL MEDICINE

## 2018-01-19 PROCEDURE — 87070 CULTURE OTHR SPECIMN AEROBIC: CPT | Performed by: INTERNAL MEDICINE

## 2018-01-19 PROCEDURE — 94799 UNLISTED PULMONARY SVC/PX: CPT

## 2018-01-19 PROCEDURE — 81001 URINALYSIS AUTO W/SCOPE: CPT | Performed by: EMERGENCY MEDICINE

## 2018-01-19 PROCEDURE — 87581 M.PNEUMON DNA AMP PROBE: CPT | Performed by: INTERNAL MEDICINE

## 2018-01-19 PROCEDURE — 83605 ASSAY OF LACTIC ACID: CPT | Performed by: INTERNAL MEDICINE

## 2018-01-19 PROCEDURE — 84484 ASSAY OF TROPONIN QUANT: CPT | Performed by: EMERGENCY MEDICINE

## 2018-01-19 PROCEDURE — 82533 TOTAL CORTISOL: CPT | Performed by: INTERNAL MEDICINE

## 2018-01-19 PROCEDURE — 87633 RESP VIRUS 12-25 TARGETS: CPT | Performed by: INTERNAL MEDICINE

## 2018-01-19 PROCEDURE — 82803 BLOOD GASES ANY COMBINATION: CPT

## 2018-01-19 PROCEDURE — 82962 GLUCOSE BLOOD TEST: CPT

## 2018-01-19 PROCEDURE — 84145 PROCALCITONIN (PCT): CPT | Performed by: EMERGENCY MEDICINE

## 2018-01-19 PROCEDURE — 87086 URINE CULTURE/COLONY COUNT: CPT | Performed by: EMERGENCY MEDICINE

## 2018-01-19 PROCEDURE — 36600 WITHDRAWAL OF ARTERIAL BLOOD: CPT

## 2018-01-19 PROCEDURE — 87147 CULTURE TYPE IMMUNOLOGIC: CPT | Performed by: INTERNAL MEDICINE

## 2018-01-19 PROCEDURE — 87186 SC STD MICRODIL/AGAR DIL: CPT | Performed by: INTERNAL MEDICINE

## 2018-01-19 PROCEDURE — 25010000002 CEFTRIAXONE PER 250 MG: Performed by: EMERGENCY MEDICINE

## 2018-01-19 PROCEDURE — 94640 AIRWAY INHALATION TREATMENT: CPT

## 2018-01-19 PROCEDURE — 87486 CHLMYD PNEUM DNA AMP PROBE: CPT | Performed by: INTERNAL MEDICINE

## 2018-01-19 PROCEDURE — 80053 COMPREHEN METABOLIC PANEL: CPT | Performed by: EMERGENCY MEDICINE

## 2018-01-19 PROCEDURE — 25010000002 PIPERACILLIN SOD-TAZOBACTAM PER 1 G: Performed by: INTERNAL MEDICINE

## 2018-01-19 PROCEDURE — 99285 EMERGENCY DEPT VISIT HI MDM: CPT

## 2018-01-19 PROCEDURE — 83880 ASSAY OF NATRIURETIC PEPTIDE: CPT | Performed by: EMERGENCY MEDICINE

## 2018-01-19 PROCEDURE — 85610 PROTHROMBIN TIME: CPT | Performed by: EMERGENCY MEDICINE

## 2018-01-19 PROCEDURE — 99222 1ST HOSP IP/OBS MODERATE 55: CPT | Performed by: INTERNAL MEDICINE

## 2018-01-19 PROCEDURE — 87798 DETECT AGENT NOS DNA AMP: CPT | Performed by: INTERNAL MEDICINE

## 2018-01-19 PROCEDURE — 93010 ELECTROCARDIOGRAM REPORT: CPT | Performed by: INTERNAL MEDICINE

## 2018-01-19 PROCEDURE — 93005 ELECTROCARDIOGRAM TRACING: CPT | Performed by: INTERNAL MEDICINE

## 2018-01-19 PROCEDURE — 71045 X-RAY EXAM CHEST 1 VIEW: CPT

## 2018-01-19 PROCEDURE — 0 IOPAMIDOL PER 1 ML: Performed by: INTERNAL MEDICINE

## 2018-01-19 PROCEDURE — 93005 ELECTROCARDIOGRAM TRACING: CPT | Performed by: EMERGENCY MEDICINE

## 2018-01-19 PROCEDURE — 85025 COMPLETE CBC W/AUTO DIFF WBC: CPT | Performed by: EMERGENCY MEDICINE

## 2018-01-19 PROCEDURE — 84145 PROCALCITONIN (PCT): CPT | Performed by: INTERNAL MEDICINE

## 2018-01-19 PROCEDURE — 83605 ASSAY OF LACTIC ACID: CPT | Performed by: EMERGENCY MEDICINE

## 2018-01-19 PROCEDURE — 87081 CULTURE SCREEN ONLY: CPT | Performed by: INTERNAL MEDICINE

## 2018-01-19 PROCEDURE — 25010000002 VANCOMYCIN 10 G RECONSTITUTED SOLUTION: Performed by: INTERNAL MEDICINE

## 2018-01-19 PROCEDURE — 71275 CT ANGIOGRAPHY CHEST: CPT

## 2018-01-19 PROCEDURE — 87040 BLOOD CULTURE FOR BACTERIA: CPT | Performed by: EMERGENCY MEDICINE

## 2018-01-19 PROCEDURE — 87076 CULTURE ANAEROBE IDENT EACH: CPT | Performed by: INTERNAL MEDICINE

## 2018-01-19 RX ORDER — LACOSAMIDE 50 MG/1
75 TABLET ORAL EVERY 12 HOURS SCHEDULED
Status: DISCONTINUED | OUTPATIENT
Start: 2018-01-19 | End: 2018-01-25 | Stop reason: HOSPADM

## 2018-01-19 RX ORDER — OXYBUTYNIN CHLORIDE 5 MG/1
5 TABLET ORAL 2 TIMES DAILY
Status: DISCONTINUED | OUTPATIENT
Start: 2018-01-19 | End: 2018-01-25 | Stop reason: HOSPADM

## 2018-01-19 RX ORDER — NICOTINE POLACRILEX 4 MG
15 LOZENGE BUCCAL
Status: DISCONTINUED | OUTPATIENT
Start: 2018-01-19 | End: 2018-01-25 | Stop reason: HOSPADM

## 2018-01-19 RX ORDER — SODIUM PHOSPHATE, DIBASIC AND SODIUM PHOSPHATE, MONOBASIC 7; 19 G/133ML; G/133ML
ENEMA RECTAL DAILY PRN
COMMUNITY
End: 2018-09-14

## 2018-01-19 RX ORDER — NUT.TX.GLUCOSE INTOLERANCE,SOY
30 BAR ORAL 2 TIMES DAILY
COMMUNITY
End: 2018-09-14

## 2018-01-19 RX ORDER — CHOLECALCIFEROL (VITAMIN D3) 125 MCG
5 CAPSULE ORAL NIGHTLY PRN
Status: DISCONTINUED | OUTPATIENT
Start: 2018-01-19 | End: 2018-01-25 | Stop reason: HOSPADM

## 2018-01-19 RX ORDER — VANCOMYCIN HYDROCHLORIDE 1 G/200ML
15 INJECTION, SOLUTION INTRAVENOUS EVERY 12 HOURS
Status: DISCONTINUED | OUTPATIENT
Start: 2018-01-19 | End: 2018-01-19 | Stop reason: DRUGHIGH

## 2018-01-19 RX ORDER — DEXTROSE MONOHYDRATE 25 G/50ML
25 INJECTION, SOLUTION INTRAVENOUS
Status: DISCONTINUED | OUTPATIENT
Start: 2018-01-19 | End: 2018-01-25 | Stop reason: HOSPADM

## 2018-01-19 RX ORDER — LEVOTHYROXINE SODIUM 0.07 MG/1
75 TABLET ORAL DAILY
Status: DISCONTINUED | OUTPATIENT
Start: 2018-01-19 | End: 2018-01-25 | Stop reason: HOSPADM

## 2018-01-19 RX ORDER — LACTOSE-REDUCED FOOD/FIBER 0.07 G-1.5
LIQUID (ML) ORAL
COMMUNITY
End: 2018-01-31 | Stop reason: SDUPTHER

## 2018-01-19 RX ORDER — ALBUTEROL SULFATE 2.5 MG/3ML
2.5 SOLUTION RESPIRATORY (INHALATION)
Status: COMPLETED | OUTPATIENT
Start: 2018-01-19 | End: 2018-01-19

## 2018-01-19 RX ORDER — SODIUM CHLORIDE 9 MG/ML
125 INJECTION, SOLUTION INTRAVENOUS CONTINUOUS
Status: DISCONTINUED | OUTPATIENT
Start: 2018-01-19 | End: 2018-01-22

## 2018-01-19 RX ORDER — CEFTRIAXONE SODIUM 1 G/50ML
1 INJECTION, SOLUTION INTRAVENOUS ONCE
Status: COMPLETED | OUTPATIENT
Start: 2018-01-19 | End: 2018-01-19

## 2018-01-19 RX ORDER — SODIUM CHLORIDE 0.9 % (FLUSH) 0.9 %
10 SYRINGE (ML) INJECTION AS NEEDED
Status: DISCONTINUED | OUTPATIENT
Start: 2018-01-19 | End: 2018-01-25 | Stop reason: HOSPADM

## 2018-01-19 RX ORDER — BUDESONIDE 0.5 MG/2ML
0.5 INHALANT ORAL
Status: DISCONTINUED | OUTPATIENT
Start: 2018-01-19 | End: 2018-01-25 | Stop reason: HOSPADM

## 2018-01-19 RX ORDER — ATORVASTATIN CALCIUM 20 MG/1
40 TABLET, FILM COATED ORAL DAILY
Status: DISCONTINUED | OUTPATIENT
Start: 2018-01-19 | End: 2018-01-25 | Stop reason: HOSPADM

## 2018-01-19 RX ORDER — LACTOSE-REDUCED FOOD/FIBER 0.07 G-1.5
LIQUID (ML) ORAL
COMMUNITY
End: 2018-09-14

## 2018-01-19 RX ORDER — MIDODRINE HYDROCHLORIDE 5 MG/1
10 TABLET ORAL 3 TIMES DAILY
Status: DISCONTINUED | OUTPATIENT
Start: 2018-01-19 | End: 2018-01-23

## 2018-01-19 RX ORDER — SODIUM CHLORIDE 0.9 % (FLUSH) 0.9 %
1-10 SYRINGE (ML) INJECTION AS NEEDED
Status: DISCONTINUED | OUTPATIENT
Start: 2018-01-19 | End: 2018-01-25 | Stop reason: HOSPADM

## 2018-01-19 RX ORDER — ACETAMINOPHEN 160 MG/5ML
650 SOLUTION ORAL EVERY 6 HOURS PRN
Status: DISCONTINUED | OUTPATIENT
Start: 2018-01-19 | End: 2018-01-25 | Stop reason: HOSPADM

## 2018-01-19 RX ORDER — FINASTERIDE 5 MG/1
5 TABLET, FILM COATED ORAL DAILY
Status: DISCONTINUED | OUTPATIENT
Start: 2018-01-19 | End: 2018-01-25 | Stop reason: HOSPADM

## 2018-01-19 RX ADMIN — BUDESONIDE 0.5 MG: 0.5 INHALANT RESPIRATORY (INHALATION) at 19:25

## 2018-01-19 RX ADMIN — ATORVASTATIN CALCIUM 40 MG: 20 TABLET, FILM COATED ORAL at 14:28

## 2018-01-19 RX ADMIN — SODIUM CHLORIDE 125 ML/HR: 9 INJECTION, SOLUTION INTRAVENOUS at 10:40

## 2018-01-19 RX ADMIN — APIXABAN 5 MG: 5 TABLET, FILM COATED ORAL at 14:27

## 2018-01-19 RX ADMIN — SODIUM CHLORIDE 1000 ML: 9 INJECTION, SOLUTION INTRAVENOUS at 23:30

## 2018-01-19 RX ADMIN — ACETAMINOPHEN 650 MG: 325 SOLUTION ORAL at 15:50

## 2018-01-19 RX ADMIN — LEVOTHYROXINE SODIUM 75 MCG: 75 TABLET ORAL at 14:28

## 2018-01-19 RX ADMIN — OXYBUTYNIN CHLORIDE 5 MG: 5 TABLET ORAL at 19:53

## 2018-01-19 RX ADMIN — CEFTRIAXONE SODIUM 1 G: 1 INJECTION, SOLUTION INTRAVENOUS at 10:44

## 2018-01-19 RX ADMIN — LEVETIRACETAM 750 MG: 500 TABLET, FILM COATED ORAL at 14:28

## 2018-01-19 RX ADMIN — ALBUTEROL SULFATE 2.5 MG: 2.5 SOLUTION RESPIRATORY (INHALATION) at 10:24

## 2018-01-19 RX ADMIN — ACETAMINOPHEN 650 MG: 325 SOLUTION ORAL at 21:46

## 2018-01-19 RX ADMIN — LEVETIRACETAM 750 MG: 500 TABLET, FILM COATED ORAL at 19:54

## 2018-01-19 RX ADMIN — SODIUM CHLORIDE 125 ML/HR: 9 INJECTION, SOLUTION INTRAVENOUS at 17:52

## 2018-01-19 RX ADMIN — IOPAMIDOL 95 ML: 755 INJECTION, SOLUTION INTRAVENOUS at 13:18

## 2018-01-19 RX ADMIN — APIXABAN 5 MG: 5 TABLET, FILM COATED ORAL at 19:53

## 2018-01-19 RX ADMIN — MIDODRINE HYDROCHLORIDE 10 MG: 5 TABLET ORAL at 19:52

## 2018-01-19 RX ADMIN — OXYBUTYNIN CHLORIDE 5 MG: 5 TABLET ORAL at 14:27

## 2018-01-19 RX ADMIN — ALBUTEROL SULFATE 2.5 MG: 2.5 SOLUTION RESPIRATORY (INHALATION) at 10:25

## 2018-01-19 RX ADMIN — TAZOBACTAM SODIUM AND PIPERACILLIN SODIUM 3.38 G: 375; 3 INJECTION, SOLUTION INTRAVENOUS at 12:41

## 2018-01-19 RX ADMIN — VANCOMYCIN HYDROCHLORIDE 1500 MG: 100 INJECTION, POWDER, LYOPHILIZED, FOR SOLUTION INTRAVENOUS at 14:07

## 2018-01-19 RX ADMIN — MIDODRINE HYDROCHLORIDE 10 MG: 5 TABLET ORAL at 14:28

## 2018-01-19 RX ADMIN — INSULIN ASPART 2 UNITS: 100 INJECTION, SOLUTION INTRAVENOUS; SUBCUTANEOUS at 17:53

## 2018-01-19 RX ADMIN — TAZOBACTAM SODIUM AND PIPERACILLIN SODIUM 3.38 G: 375; 3 INJECTION, SOLUTION INTRAVENOUS at 19:52

## 2018-01-19 RX ADMIN — FINASTERIDE 5 MG: 5 TABLET, FILM COATED ORAL at 14:28

## 2018-01-19 RX ADMIN — SODIUM CHLORIDE 500 ML: 9 INJECTION, SOLUTION INTRAVENOUS at 16:05

## 2018-01-19 RX ADMIN — LACOSAMIDE 75 MG: 50 TABLET, FILM COATED ORAL at 18:34

## 2018-01-19 RX ADMIN — SODIUM CHLORIDE 125 ML/HR: 9 INJECTION, SOLUTION INTRAVENOUS at 12:46

## 2018-01-20 LAB
ANION GAP SERPL CALCULATED.3IONS-SCNC: 13.4 MMOL/L
B PERT DNA SPEC QL NAA+PROBE: NOT DETECTED
BUN BLD-MCNC: 33 MG/DL (ref 8–23)
BUN/CREAT SERPL: 28.9 (ref 7–25)
C DIFF TOX GENS STL QL NAA+PROBE: POSITIVE
C PNEUM DNA NPH QL NAA+NON-PROBE: NOT DETECTED
CALCIUM SPEC-SCNC: 7.6 MG/DL (ref 8.6–10.5)
CHLORIDE SERPL-SCNC: 105 MMOL/L (ref 98–107)
CO2 SERPL-SCNC: 20.6 MMOL/L (ref 22–29)
CREAT BLD-MCNC: 1.14 MG/DL (ref 0.76–1.27)
D-LACTATE SERPL-SCNC: 2.2 MMOL/L (ref 0.5–2)
DEPRECATED RDW RBC AUTO: 55.4 FL (ref 37–54)
ERYTHROCYTE [DISTWIDTH] IN BLOOD BY AUTOMATED COUNT: 14.2 % (ref 11.5–14.5)
FLUAV H1 2009 PAND RNA NPH QL NAA+PROBE: NOT DETECTED
FLUAV H1 HA GENE NPH QL NAA+PROBE: NOT DETECTED
FLUAV H3 RNA NPH QL NAA+PROBE: NOT DETECTED
FLUAV SUBTYP SPEC NAA+PROBE: NOT DETECTED
FLUBV RNA ISLT QL NAA+PROBE: NOT DETECTED
GFR SERPL CREATININE-BSD FRML MDRD: 61 ML/MIN/1.73
GLUCOSE BLD-MCNC: 152 MG/DL (ref 65–99)
GLUCOSE BLDC GLUCOMTR-MCNC: 127 MG/DL (ref 70–130)
GLUCOSE BLDC GLUCOMTR-MCNC: 135 MG/DL (ref 70–130)
GLUCOSE BLDC GLUCOMTR-MCNC: 135 MG/DL (ref 70–130)
GLUCOSE BLDC GLUCOMTR-MCNC: 184 MG/DL (ref 70–130)
GLUCOSE BLDC GLUCOMTR-MCNC: 186 MG/DL (ref 70–130)
GLUCOSE BLDC GLUCOMTR-MCNC: 186 MG/DL (ref 70–130)
HADV DNA SPEC NAA+PROBE: NOT DETECTED
HCOV 229E RNA SPEC QL NAA+PROBE: NOT DETECTED
HCOV HKU1 RNA SPEC QL NAA+PROBE: NOT DETECTED
HCOV NL63 RNA SPEC QL NAA+PROBE: NOT DETECTED
HCOV OC43 RNA SPEC QL NAA+PROBE: NOT DETECTED
HCT VFR BLD AUTO: 31.2 % (ref 40.4–52.2)
HGB BLD-MCNC: 9.6 G/DL (ref 13.7–17.6)
HMPV RNA NPH QL NAA+NON-PROBE: NOT DETECTED
HPIV1 RNA SPEC QL NAA+PROBE: NOT DETECTED
HPIV2 RNA SPEC QL NAA+PROBE: NOT DETECTED
HPIV3 RNA NPH QL NAA+PROBE: NOT DETECTED
HPIV4 P GENE NPH QL NAA+PROBE: NOT DETECTED
LYMPHOCYTES # BLD MANUAL: 0.69 10*3/MM3 (ref 0.9–4.8)
LYMPHOCYTES NFR BLD MANUAL: 6 % (ref 19.6–45.3)
LYMPHOCYTES NFR BLD MANUAL: 8 % (ref 5–12)
M PNEUMO IGG SER IA-ACNC: NOT DETECTED
MCH RBC QN AUTO: 32.8 PG (ref 27–32.7)
MCHC RBC AUTO-ENTMCNC: 30.8 G/DL (ref 32.6–36.4)
MCV RBC AUTO: 106.5 FL (ref 79.8–96.2)
METAMYELOCYTES NFR BLD MANUAL: 1 % (ref 0–0)
MONOCYTES # BLD AUTO: 0.91 10*3/MM3 (ref 0.2–1.2)
NEUTROPHILS # BLD AUTO: 9.71 10*3/MM3 (ref 1.9–8.1)
NEUTROPHILS NFR BLD MANUAL: 85 % (ref 42.7–76)
PLAT MORPH BLD: NORMAL
PLATELET # BLD AUTO: 167 10*3/MM3 (ref 140–500)
PMV BLD AUTO: 12.1 FL (ref 6–12)
POTASSIUM BLD-SCNC: 3.4 MMOL/L (ref 3.5–5.2)
POTASSIUM BLD-SCNC: 4 MMOL/L (ref 3.5–5.2)
RBC # BLD AUTO: 2.93 10*6/MM3 (ref 4.6–6)
RBC MORPH BLD: NORMAL
RHINOVIRUS RNA SPEC NAA+PROBE: NOT DETECTED
RSV RNA NPH QL NAA+NON-PROBE: NOT DETECTED
SCAN SLIDE: NORMAL
SODIUM BLD-SCNC: 139 MMOL/L (ref 136–145)
TROPONIN T SERPL-MCNC: 0.02 NG/ML (ref 0–0.03)
WBC MORPH BLD: NORMAL
WBC NRBC COR # BLD: 11.42 10*3/MM3 (ref 4.5–10.7)

## 2018-01-20 PROCEDURE — 80048 BASIC METABOLIC PNL TOTAL CA: CPT | Performed by: INTERNAL MEDICINE

## 2018-01-20 PROCEDURE — 85025 COMPLETE CBC W/AUTO DIFF WBC: CPT | Performed by: INTERNAL MEDICINE

## 2018-01-20 PROCEDURE — 25010000002 VANCOMYCIN 750 MG RECONSTITUTED SOLUTION: Performed by: INTERNAL MEDICINE

## 2018-01-20 PROCEDURE — 93010 ELECTROCARDIOGRAM REPORT: CPT | Performed by: INTERNAL MEDICINE

## 2018-01-20 PROCEDURE — 25010000002 PIPERACILLIN SOD-TAZOBACTAM PER 1 G: Performed by: INTERNAL MEDICINE

## 2018-01-20 PROCEDURE — 82962 GLUCOSE BLOOD TEST: CPT

## 2018-01-20 PROCEDURE — 84132 ASSAY OF SERUM POTASSIUM: CPT | Performed by: INTERNAL MEDICINE

## 2018-01-20 PROCEDURE — 94799 UNLISTED PULMONARY SVC/PX: CPT

## 2018-01-20 PROCEDURE — 84484 ASSAY OF TROPONIN QUANT: CPT | Performed by: INTERNAL MEDICINE

## 2018-01-20 PROCEDURE — 83605 ASSAY OF LACTIC ACID: CPT | Performed by: INTERNAL MEDICINE

## 2018-01-20 PROCEDURE — 25010000002 VANCOMYCIN 10 G RECONSTITUTED SOLUTION: Performed by: INTERNAL MEDICINE

## 2018-01-20 PROCEDURE — 87493 C DIFF AMPLIFIED PROBE: CPT | Performed by: INTERNAL MEDICINE

## 2018-01-20 PROCEDURE — 93005 ELECTROCARDIOGRAM TRACING: CPT | Performed by: INTERNAL MEDICINE

## 2018-01-20 PROCEDURE — 63710000001 INSULIN ASPART PER 5 UNITS: Performed by: INTERNAL MEDICINE

## 2018-01-20 PROCEDURE — 85007 BL SMEAR W/DIFF WBC COUNT: CPT | Performed by: INTERNAL MEDICINE

## 2018-01-20 RX ORDER — POTASSIUM CHLORIDE 1.5 G/1.77G
40 POWDER, FOR SOLUTION ORAL AS NEEDED
Status: DISCONTINUED | OUTPATIENT
Start: 2018-01-20 | End: 2018-01-25 | Stop reason: HOSPADM

## 2018-01-20 RX ORDER — POTASSIUM CHLORIDE 7.45 MG/ML
10 INJECTION INTRAVENOUS
Status: DISCONTINUED | OUTPATIENT
Start: 2018-01-20 | End: 2018-01-25 | Stop reason: HOSPADM

## 2018-01-20 RX ORDER — LEVETIRACETAM 100 MG/ML
750 SOLUTION ORAL EVERY 12 HOURS SCHEDULED
Status: DISCONTINUED | OUTPATIENT
Start: 2018-01-20 | End: 2018-01-25 | Stop reason: HOSPADM

## 2018-01-20 RX ORDER — POTASSIUM CHLORIDE 750 MG/1
40 CAPSULE, EXTENDED RELEASE ORAL AS NEEDED
Status: DISCONTINUED | OUTPATIENT
Start: 2018-01-20 | End: 2018-01-25 | Stop reason: HOSPADM

## 2018-01-20 RX ORDER — METRONIDAZOLE 500 MG/1
500 TABLET ORAL EVERY 8 HOURS SCHEDULED
Status: DISCONTINUED | OUTPATIENT
Start: 2018-01-20 | End: 2018-01-23 | Stop reason: ALTCHOICE

## 2018-01-20 RX ADMIN — METRONIDAZOLE 500 MG: 500 TABLET, FILM COATED ORAL at 15:24

## 2018-01-20 RX ADMIN — TAZOBACTAM SODIUM AND PIPERACILLIN SODIUM 3.38 G: 375; 3 INJECTION, SOLUTION INTRAVENOUS at 10:29

## 2018-01-20 RX ADMIN — SODIUM CHLORIDE 125 ML/HR: 9 INJECTION, SOLUTION INTRAVENOUS at 08:38

## 2018-01-20 RX ADMIN — MIDODRINE HYDROCHLORIDE 10 MG: 5 TABLET ORAL at 20:30

## 2018-01-20 RX ADMIN — LACOSAMIDE 75 MG: 50 TABLET, FILM COATED ORAL at 20:31

## 2018-01-20 RX ADMIN — MIDODRINE HYDROCHLORIDE 10 MG: 5 TABLET ORAL at 05:02

## 2018-01-20 RX ADMIN — LACOSAMIDE 75 MG: 50 TABLET, FILM COATED ORAL at 08:40

## 2018-01-20 RX ADMIN — MIDODRINE HYDROCHLORIDE 10 MG: 5 TABLET ORAL at 15:24

## 2018-01-20 RX ADMIN — INSULIN ASPART 2 UNITS: 100 INJECTION, SOLUTION INTRAVENOUS; SUBCUTANEOUS at 08:41

## 2018-01-20 RX ADMIN — POTASSIUM CHLORIDE 40 MEQ: 1.5 POWDER, FOR SOLUTION ORAL at 12:57

## 2018-01-20 RX ADMIN — LEVOTHYROXINE SODIUM 75 MCG: 75 TABLET ORAL at 08:41

## 2018-01-20 RX ADMIN — SODIUM CHLORIDE 125 ML/HR: 9 INJECTION, SOLUTION INTRAVENOUS at 18:24

## 2018-01-20 RX ADMIN — ATORVASTATIN CALCIUM 40 MG: 20 TABLET, FILM COATED ORAL at 10:29

## 2018-01-20 RX ADMIN — LEVETIRACETAM 750 MG: 100 SOLUTION ORAL at 10:29

## 2018-01-20 RX ADMIN — TAZOBACTAM SODIUM AND PIPERACILLIN SODIUM 3.38 G: 375; 3 INJECTION, SOLUTION INTRAVENOUS at 01:40

## 2018-01-20 RX ADMIN — LEVETIRACETAM 750 MG: 100 SOLUTION ORAL at 20:34

## 2018-01-20 RX ADMIN — VANCOMYCIN HYDROCHLORIDE 750 MG: 10 INJECTION, POWDER, LYOPHILIZED, FOR SOLUTION INTRAVENOUS at 00:56

## 2018-01-20 RX ADMIN — APIXABAN 5 MG: 5 TABLET, FILM COATED ORAL at 08:41

## 2018-01-20 RX ADMIN — BUDESONIDE 0.5 MG: 0.5 INHALANT RESPIRATORY (INHALATION) at 20:21

## 2018-01-20 RX ADMIN — APIXABAN 5 MG: 5 TABLET, FILM COATED ORAL at 20:30

## 2018-01-20 RX ADMIN — POTASSIUM CHLORIDE 40 MEQ: 1.5 POWDER, FOR SOLUTION ORAL at 08:40

## 2018-01-20 RX ADMIN — BUDESONIDE 0.5 MG: 0.5 INHALANT RESPIRATORY (INHALATION) at 08:02

## 2018-01-20 RX ADMIN — OXYBUTYNIN CHLORIDE 5 MG: 5 TABLET ORAL at 08:41

## 2018-01-20 RX ADMIN — METRONIDAZOLE 500 MG: 500 TABLET, FILM COATED ORAL at 22:03

## 2018-01-20 RX ADMIN — TAZOBACTAM SODIUM AND PIPERACILLIN SODIUM 3.38 G: 375; 3 INJECTION, SOLUTION INTRAVENOUS at 18:24

## 2018-01-20 RX ADMIN — SODIUM CHLORIDE 125 ML/HR: 9 INJECTION, SOLUTION INTRAVENOUS at 00:33

## 2018-01-20 RX ADMIN — INSULIN ASPART 2 UNITS: 100 INJECTION, SOLUTION INTRAVENOUS; SUBCUTANEOUS at 17:12

## 2018-01-20 RX ADMIN — OXYBUTYNIN CHLORIDE 5 MG: 5 TABLET ORAL at 20:30

## 2018-01-20 RX ADMIN — VANCOMYCIN HYDROCHLORIDE 750 MG: 10 INJECTION, POWDER, LYOPHILIZED, FOR SOLUTION INTRAVENOUS at 14:38

## 2018-01-21 LAB
ANION GAP SERPL CALCULATED.3IONS-SCNC: 10.4 MMOL/L
BACTERIA SPEC AEROBE CULT: ABNORMAL
BACTERIA SPEC AEROBE CULT: ABNORMAL
BUN BLD-MCNC: 28 MG/DL (ref 8–23)
BUN/CREAT SERPL: 27.2 (ref 7–25)
CALCIUM SPEC-SCNC: 7.3 MG/DL (ref 8.6–10.5)
CHLORIDE SERPL-SCNC: 113 MMOL/L (ref 98–107)
CO2 SERPL-SCNC: 20.6 MMOL/L (ref 22–29)
CREAT BLD-MCNC: 1.03 MG/DL (ref 0.76–1.27)
GFR SERPL CREATININE-BSD FRML MDRD: 68 ML/MIN/1.73
GLUCOSE BLD-MCNC: 155 MG/DL (ref 65–99)
GLUCOSE BLDC GLUCOMTR-MCNC: 150 MG/DL (ref 70–130)
GLUCOSE BLDC GLUCOMTR-MCNC: 151 MG/DL (ref 70–130)
GLUCOSE BLDC GLUCOMTR-MCNC: 164 MG/DL (ref 70–130)
GLUCOSE BLDC GLUCOMTR-MCNC: 175 MG/DL (ref 70–130)
GLUCOSE BLDC GLUCOMTR-MCNC: 192 MG/DL (ref 70–130)
MRSA SPEC QL CULT: NORMAL
POTASSIUM BLD-SCNC: 3.4 MMOL/L (ref 3.5–5.2)
POTASSIUM BLD-SCNC: 5 MMOL/L (ref 3.5–5.2)
SODIUM BLD-SCNC: 144 MMOL/L (ref 136–145)
VANCOMYCIN TROUGH SERPL-MCNC: 17.5 MCG/ML (ref 5–20)

## 2018-01-21 PROCEDURE — 25010000002 VANCOMYCIN 10 G RECONSTITUTED SOLUTION: Performed by: INTERNAL MEDICINE

## 2018-01-21 PROCEDURE — 63710000001 INSULIN ASPART PER 5 UNITS: Performed by: INTERNAL MEDICINE

## 2018-01-21 PROCEDURE — 82962 GLUCOSE BLOOD TEST: CPT

## 2018-01-21 PROCEDURE — 80202 ASSAY OF VANCOMYCIN: CPT | Performed by: INTERNAL MEDICINE

## 2018-01-21 PROCEDURE — 94799 UNLISTED PULMONARY SVC/PX: CPT

## 2018-01-21 PROCEDURE — 80048 BASIC METABOLIC PNL TOTAL CA: CPT | Performed by: INTERNAL MEDICINE

## 2018-01-21 PROCEDURE — 25010000002 PIPERACILLIN SOD-TAZOBACTAM PER 1 G: Performed by: INTERNAL MEDICINE

## 2018-01-21 PROCEDURE — 84132 ASSAY OF SERUM POTASSIUM: CPT | Performed by: INTERNAL MEDICINE

## 2018-01-21 RX ORDER — FLUCONAZOLE 200 MG/1
200 TABLET ORAL EVERY 24 HOURS
Status: DISCONTINUED | OUTPATIENT
Start: 2018-01-21 | End: 2018-01-22

## 2018-01-21 RX ADMIN — OXYBUTYNIN CHLORIDE 5 MG: 5 TABLET ORAL at 22:22

## 2018-01-21 RX ADMIN — APIXABAN 5 MG: 5 TABLET, FILM COATED ORAL at 08:54

## 2018-01-21 RX ADMIN — LEVOTHYROXINE SODIUM 75 MCG: 75 TABLET ORAL at 08:54

## 2018-01-21 RX ADMIN — LEVETIRACETAM 750 MG: 100 SOLUTION ORAL at 22:21

## 2018-01-21 RX ADMIN — INSULIN ASPART 2 UNITS: 100 INJECTION, SOLUTION INTRAVENOUS; SUBCUTANEOUS at 08:54

## 2018-01-21 RX ADMIN — OXYBUTYNIN CHLORIDE 5 MG: 5 TABLET ORAL at 08:54

## 2018-01-21 RX ADMIN — TAZOBACTAM SODIUM AND PIPERACILLIN SODIUM 3.38 G: 375; 3 INJECTION, SOLUTION INTRAVENOUS at 01:36

## 2018-01-21 RX ADMIN — METRONIDAZOLE 500 MG: 500 TABLET, FILM COATED ORAL at 06:10

## 2018-01-21 RX ADMIN — POTASSIUM CHLORIDE 40 MEQ: 1.5 POWDER, FOR SOLUTION ORAL at 08:55

## 2018-01-21 RX ADMIN — INSULIN ASPART 2 UNITS: 100 INJECTION, SOLUTION INTRAVENOUS; SUBCUTANEOUS at 22:22

## 2018-01-21 RX ADMIN — POTASSIUM CHLORIDE 40 MEQ: 1.5 POWDER, FOR SOLUTION ORAL at 04:49

## 2018-01-21 RX ADMIN — LACOSAMIDE 75 MG: 50 TABLET, FILM COATED ORAL at 22:22

## 2018-01-21 RX ADMIN — MIDODRINE HYDROCHLORIDE 10 MG: 5 TABLET ORAL at 08:54

## 2018-01-21 RX ADMIN — VANCOMYCIN HYDROCHLORIDE 750 MG: 10 INJECTION, POWDER, LYOPHILIZED, FOR SOLUTION INTRAVENOUS at 01:36

## 2018-01-21 RX ADMIN — INSULIN ASPART 2 UNITS: 100 INJECTION, SOLUTION INTRAVENOUS; SUBCUTANEOUS at 12:33

## 2018-01-21 RX ADMIN — BUDESONIDE 0.5 MG: 0.5 INHALANT RESPIRATORY (INHALATION) at 08:30

## 2018-01-21 RX ADMIN — APIXABAN 5 MG: 5 TABLET, FILM COATED ORAL at 22:22

## 2018-01-21 RX ADMIN — MIDODRINE HYDROCHLORIDE 10 MG: 5 TABLET ORAL at 17:47

## 2018-01-21 RX ADMIN — FLUCONAZOLE 200 MG: 200 TABLET ORAL at 12:33

## 2018-01-21 RX ADMIN — METRONIDAZOLE 500 MG: 500 TABLET, FILM COATED ORAL at 22:22

## 2018-01-21 RX ADMIN — ATORVASTATIN CALCIUM 40 MG: 20 TABLET, FILM COATED ORAL at 08:54

## 2018-01-21 RX ADMIN — LACOSAMIDE 75 MG: 50 TABLET, FILM COATED ORAL at 12:32

## 2018-01-21 RX ADMIN — BUDESONIDE 0.5 MG: 0.5 INHALANT RESPIRATORY (INHALATION) at 22:55

## 2018-01-21 RX ADMIN — METRONIDAZOLE 500 MG: 500 TABLET, FILM COATED ORAL at 14:46

## 2018-01-21 RX ADMIN — LEVETIRACETAM 750 MG: 100 SOLUTION ORAL at 08:55

## 2018-01-21 RX ADMIN — ACETAMINOPHEN 650 MG: 325 SOLUTION ORAL at 12:33

## 2018-01-21 RX ADMIN — INSULIN ASPART 2 UNITS: 100 INJECTION, SOLUTION INTRAVENOUS; SUBCUTANEOUS at 17:48

## 2018-01-22 ENCOUNTER — APPOINTMENT (OUTPATIENT)
Dept: CT IMAGING | Facility: HOSPITAL | Age: 83
End: 2018-01-22

## 2018-01-22 PROBLEM — A04.72 CLOSTRIDIUM DIFFICILE COLITIS: Status: ACTIVE | Noted: 2018-01-22

## 2018-01-22 LAB
BACTERIA SPEC RESP CULT: ABNORMAL
GLUCOSE BLDC GLUCOMTR-MCNC: 195 MG/DL (ref 70–130)
GLUCOSE BLDC GLUCOMTR-MCNC: 204 MG/DL (ref 70–130)
GLUCOSE BLDC GLUCOMTR-MCNC: 217 MG/DL (ref 70–130)
GLUCOSE BLDC GLUCOMTR-MCNC: 230 MG/DL (ref 70–130)

## 2018-01-22 PROCEDURE — 93010 ELECTROCARDIOGRAM REPORT: CPT | Performed by: INTERNAL MEDICINE

## 2018-01-22 PROCEDURE — 93005 ELECTROCARDIOGRAM TRACING: CPT | Performed by: INTERNAL MEDICINE

## 2018-01-22 PROCEDURE — 94799 UNLISTED PULMONARY SVC/PX: CPT

## 2018-01-22 PROCEDURE — 82962 GLUCOSE BLOOD TEST: CPT

## 2018-01-22 PROCEDURE — 74176 CT ABD & PELVIS W/O CONTRAST: CPT

## 2018-01-22 PROCEDURE — 63710000001 INSULIN ASPART PER 5 UNITS: Performed by: INTERNAL MEDICINE

## 2018-01-22 RX ORDER — FLUCONAZOLE 40 MG/ML
200 POWDER, FOR SUSPENSION ORAL DAILY
Status: DISCONTINUED | OUTPATIENT
Start: 2018-01-22 | End: 2018-01-23

## 2018-01-22 RX ADMIN — LEVOTHYROXINE SODIUM 75 MCG: 75 TABLET ORAL at 09:23

## 2018-01-22 RX ADMIN — ATORVASTATIN CALCIUM 40 MG: 20 TABLET, FILM COATED ORAL at 09:23

## 2018-01-22 RX ADMIN — METRONIDAZOLE 500 MG: 500 TABLET, FILM COATED ORAL at 23:57

## 2018-01-22 RX ADMIN — LEVETIRACETAM 750 MG: 100 SOLUTION ORAL at 23:47

## 2018-01-22 RX ADMIN — FINASTERIDE 5 MG: 5 TABLET, FILM COATED ORAL at 09:23

## 2018-01-22 RX ADMIN — BUDESONIDE 0.5 MG: 0.5 INHALANT RESPIRATORY (INHALATION) at 20:08

## 2018-01-22 RX ADMIN — SODIUM CHLORIDE 125 ML/HR: 9 INJECTION, SOLUTION INTRAVENOUS at 00:09

## 2018-01-22 RX ADMIN — OXYBUTYNIN CHLORIDE 5 MG: 5 TABLET ORAL at 09:23

## 2018-01-22 RX ADMIN — OXYBUTYNIN CHLORIDE 5 MG: 5 TABLET ORAL at 23:46

## 2018-01-22 RX ADMIN — INSULIN ASPART 2 UNITS: 100 INJECTION, SOLUTION INTRAVENOUS; SUBCUTANEOUS at 09:32

## 2018-01-22 RX ADMIN — APIXABAN 5 MG: 5 TABLET, FILM COATED ORAL at 09:23

## 2018-01-22 RX ADMIN — METRONIDAZOLE 500 MG: 500 TABLET, FILM COATED ORAL at 17:26

## 2018-01-22 RX ADMIN — FLUCONAZOLE 200 MG: 40 POWDER, FOR SUSPENSION ORAL at 12:25

## 2018-01-22 RX ADMIN — INSULIN ASPART 2 UNITS: 100 INJECTION, SOLUTION INTRAVENOUS; SUBCUTANEOUS at 12:26

## 2018-01-22 RX ADMIN — MIDODRINE HYDROCHLORIDE 10 MG: 5 TABLET ORAL at 09:23

## 2018-01-22 RX ADMIN — LACOSAMIDE 75 MG: 50 TABLET, FILM COATED ORAL at 09:03

## 2018-01-22 RX ADMIN — INSULIN ASPART 4 UNITS: 100 INJECTION, SOLUTION INTRAVENOUS; SUBCUTANEOUS at 23:58

## 2018-01-22 RX ADMIN — ALBUTEROL SULFATE 2.5 MG: 2.5 SOLUTION RESPIRATORY (INHALATION) at 20:08

## 2018-01-22 RX ADMIN — INSULIN ASPART 4 UNITS: 100 INJECTION, SOLUTION INTRAVENOUS; SUBCUTANEOUS at 19:07

## 2018-01-22 RX ADMIN — BUDESONIDE 0.5 MG: 0.5 INHALANT RESPIRATORY (INHALATION) at 11:07

## 2018-01-22 RX ADMIN — METRONIDAZOLE 500 MG: 500 TABLET, FILM COATED ORAL at 06:23

## 2018-01-22 RX ADMIN — LEVETIRACETAM 750 MG: 100 SOLUTION ORAL at 09:23

## 2018-01-22 RX ADMIN — LACOSAMIDE 75 MG: 50 TABLET, FILM COATED ORAL at 23:47

## 2018-01-22 RX ADMIN — APIXABAN 5 MG: 5 TABLET, FILM COATED ORAL at 23:47

## 2018-01-23 LAB
ANION GAP SERPL CALCULATED.3IONS-SCNC: 5.9 MMOL/L
BASOPHILS # BLD AUTO: 0.01 10*3/MM3 (ref 0–0.2)
BASOPHILS NFR BLD AUTO: 0.1 % (ref 0–1.5)
BUN BLD-MCNC: 14 MG/DL (ref 8–23)
BUN/CREAT SERPL: 20.3 (ref 7–25)
CALCIUM SPEC-SCNC: 8.2 MG/DL (ref 8.6–10.5)
CHLORIDE SERPL-SCNC: 107 MMOL/L (ref 98–107)
CO2 SERPL-SCNC: 29.1 MMOL/L (ref 22–29)
CREAT BLD-MCNC: 0.69 MG/DL (ref 0.76–1.27)
DEPRECATED RDW RBC AUTO: 53.3 FL (ref 37–54)
EOSINOPHIL # BLD AUTO: 0.19 10*3/MM3 (ref 0–0.7)
EOSINOPHIL NFR BLD AUTO: 2.4 % (ref 0.3–6.2)
ERYTHROCYTE [DISTWIDTH] IN BLOOD BY AUTOMATED COUNT: 13.7 % (ref 11.5–14.5)
GFR SERPL CREATININE-BSD FRML MDRD: 108 ML/MIN/1.73
GLUCOSE BLD-MCNC: 190 MG/DL (ref 65–99)
GLUCOSE BLDC GLUCOMTR-MCNC: 171 MG/DL (ref 70–130)
GLUCOSE BLDC GLUCOMTR-MCNC: 190 MG/DL (ref 70–130)
GLUCOSE BLDC GLUCOMTR-MCNC: 235 MG/DL (ref 70–130)
GLUCOSE BLDC GLUCOMTR-MCNC: 249 MG/DL (ref 70–130)
HCT VFR BLD AUTO: 35.2 % (ref 40.4–52.2)
HGB BLD-MCNC: 10.8 G/DL (ref 13.7–17.6)
IMM GRANULOCYTES # BLD: 0 10*3/MM3 (ref 0–0.03)
IMM GRANULOCYTES NFR BLD: 0 % (ref 0–0.5)
LYMPHOCYTES # BLD AUTO: 1.25 10*3/MM3 (ref 0.9–4.8)
LYMPHOCYTES NFR BLD AUTO: 16.1 % (ref 19.6–45.3)
MCH RBC QN AUTO: 32.7 PG (ref 27–32.7)
MCHC RBC AUTO-ENTMCNC: 30.7 G/DL (ref 32.6–36.4)
MCV RBC AUTO: 106.7 FL (ref 79.8–96.2)
MONOCYTES # BLD AUTO: 0.64 10*3/MM3 (ref 0.2–1.2)
MONOCYTES NFR BLD AUTO: 8.2 % (ref 5–12)
NEUTROPHILS # BLD AUTO: 5.67 10*3/MM3 (ref 1.9–8.1)
NEUTROPHILS NFR BLD AUTO: 73.2 % (ref 42.7–76)
PLATELET # BLD AUTO: 187 10*3/MM3 (ref 140–500)
PMV BLD AUTO: 11.3 FL (ref 6–12)
POTASSIUM BLD-SCNC: 3.4 MMOL/L (ref 3.5–5.2)
RBC # BLD AUTO: 3.3 10*6/MM3 (ref 4.6–6)
SODIUM BLD-SCNC: 142 MMOL/L (ref 136–145)
WBC NRBC COR # BLD: 7.76 10*3/MM3 (ref 4.5–10.7)

## 2018-01-23 PROCEDURE — 94799 UNLISTED PULMONARY SVC/PX: CPT

## 2018-01-23 PROCEDURE — 85025 COMPLETE CBC W/AUTO DIFF WBC: CPT | Performed by: INTERNAL MEDICINE

## 2018-01-23 PROCEDURE — 80048 BASIC METABOLIC PNL TOTAL CA: CPT | Performed by: INTERNAL MEDICINE

## 2018-01-23 PROCEDURE — 97162 PT EVAL MOD COMPLEX 30 MIN: CPT

## 2018-01-23 PROCEDURE — 82962 GLUCOSE BLOOD TEST: CPT

## 2018-01-23 PROCEDURE — 97110 THERAPEUTIC EXERCISES: CPT

## 2018-01-23 PROCEDURE — 25010000003 POTASSIUM CHLORIDE 10 MEQ/100ML SOLUTION: Performed by: INTERNAL MEDICINE

## 2018-01-23 PROCEDURE — 63710000001 INSULIN ASPART PER 5 UNITS: Performed by: INTERNAL MEDICINE

## 2018-01-23 RX ORDER — MIDODRINE HYDROCHLORIDE 5 MG/1
5 TABLET ORAL 3 TIMES DAILY
Status: DISCONTINUED | OUTPATIENT
Start: 2018-01-23 | End: 2018-01-25 | Stop reason: HOSPADM

## 2018-01-23 RX ADMIN — BUDESONIDE 0.5 MG: 0.5 INHALANT RESPIRATORY (INHALATION) at 10:35

## 2018-01-23 RX ADMIN — INSULIN ASPART 4 UNITS: 100 INJECTION, SOLUTION INTRAVENOUS; SUBCUTANEOUS at 13:37

## 2018-01-23 RX ADMIN — VANCOMYCIN 125 MG: KIT at 18:25

## 2018-01-23 RX ADMIN — METFORMIN HYDROCHLORIDE 500 MG: 500 TABLET ORAL at 18:27

## 2018-01-23 RX ADMIN — LEVETIRACETAM 750 MG: 100 SOLUTION ORAL at 22:42

## 2018-01-23 RX ADMIN — POTASSIUM CHLORIDE 40 MEQ: 750 CAPSULE, EXTENDED RELEASE ORAL at 18:00

## 2018-01-23 RX ADMIN — LEVETIRACETAM 750 MG: 100 SOLUTION ORAL at 10:45

## 2018-01-23 RX ADMIN — INSULIN ASPART 2 UNITS: 100 INJECTION, SOLUTION INTRAVENOUS; SUBCUTANEOUS at 22:49

## 2018-01-23 RX ADMIN — INSULIN ASPART 4 UNITS: 100 INJECTION, SOLUTION INTRAVENOUS; SUBCUTANEOUS at 18:25

## 2018-01-23 RX ADMIN — LINAGLIPTIN 5 MG: 5 TABLET, FILM COATED ORAL at 18:26

## 2018-01-23 RX ADMIN — BUDESONIDE 0.5 MG: 0.5 INHALANT RESPIRATORY (INHALATION) at 20:43

## 2018-01-23 RX ADMIN — POTASSIUM CHLORIDE 10 MEQ: 7.46 INJECTION, SOLUTION INTRAVENOUS at 10:51

## 2018-01-23 RX ADMIN — FINASTERIDE 5 MG: 5 TABLET, FILM COATED ORAL at 10:49

## 2018-01-23 RX ADMIN — LACOSAMIDE 75 MG: 50 TABLET, FILM COATED ORAL at 10:44

## 2018-01-23 RX ADMIN — INSULIN ASPART 2 UNITS: 100 INJECTION, SOLUTION INTRAVENOUS; SUBCUTANEOUS at 10:50

## 2018-01-23 RX ADMIN — ATORVASTATIN CALCIUM 40 MG: 20 TABLET, FILM COATED ORAL at 10:48

## 2018-01-23 RX ADMIN — METRONIDAZOLE 500 MG: 500 TABLET, FILM COATED ORAL at 06:13

## 2018-01-23 RX ADMIN — POTASSIUM CHLORIDE 10 MEQ: 7.46 INJECTION, SOLUTION INTRAVENOUS at 13:38

## 2018-01-23 RX ADMIN — LEVOTHYROXINE SODIUM 75 MCG: 75 TABLET ORAL at 10:47

## 2018-01-23 RX ADMIN — METRONIDAZOLE 500 MG: 500 TABLET, FILM COATED ORAL at 13:38

## 2018-01-23 RX ADMIN — LACOSAMIDE 75 MG: 50 TABLET, FILM COATED ORAL at 22:44

## 2018-01-23 RX ADMIN — OXYBUTYNIN CHLORIDE 5 MG: 5 TABLET ORAL at 10:47

## 2018-01-23 RX ADMIN — OXYBUTYNIN CHLORIDE 5 MG: 5 TABLET ORAL at 22:45

## 2018-01-23 RX ADMIN — APIXABAN 5 MG: 5 TABLET, FILM COATED ORAL at 10:47

## 2018-01-23 RX ADMIN — APIXABAN 5 MG: 5 TABLET, FILM COATED ORAL at 22:45

## 2018-01-24 LAB
BACTERIA SPEC AEROBE CULT: NORMAL
BACTERIA SPEC AEROBE CULT: NORMAL
GLUCOSE BLDC GLUCOMTR-MCNC: 154 MG/DL (ref 70–130)
GLUCOSE BLDC GLUCOMTR-MCNC: 180 MG/DL (ref 70–130)
GLUCOSE BLDC GLUCOMTR-MCNC: 181 MG/DL (ref 70–130)
GLUCOSE BLDC GLUCOMTR-MCNC: 210 MG/DL (ref 70–130)
POTASSIUM BLD-SCNC: 3.6 MMOL/L (ref 3.5–5.2)

## 2018-01-24 PROCEDURE — 82962 GLUCOSE BLOOD TEST: CPT

## 2018-01-24 PROCEDURE — 94799 UNLISTED PULMONARY SVC/PX: CPT

## 2018-01-24 PROCEDURE — 97110 THERAPEUTIC EXERCISES: CPT

## 2018-01-24 PROCEDURE — 84132 ASSAY OF SERUM POTASSIUM: CPT | Performed by: INTERNAL MEDICINE

## 2018-01-24 PROCEDURE — 63710000001 INSULIN ASPART PER 5 UNITS: Performed by: INTERNAL MEDICINE

## 2018-01-24 RX ADMIN — VANCOMYCIN 125 MG: KIT at 01:30

## 2018-01-24 RX ADMIN — BUDESONIDE 0.5 MG: 0.5 INHALANT RESPIRATORY (INHALATION) at 07:17

## 2018-01-24 RX ADMIN — OXYBUTYNIN CHLORIDE 5 MG: 5 TABLET ORAL at 10:13

## 2018-01-24 RX ADMIN — INSULIN ASPART 2 UNITS: 100 INJECTION, SOLUTION INTRAVENOUS; SUBCUTANEOUS at 17:23

## 2018-01-24 RX ADMIN — METFORMIN HYDROCHLORIDE 500 MG: 500 TABLET ORAL at 10:13

## 2018-01-24 RX ADMIN — ATORVASTATIN CALCIUM 40 MG: 20 TABLET, FILM COATED ORAL at 12:17

## 2018-01-24 RX ADMIN — INSULIN ASPART 2 UNITS: 100 INJECTION, SOLUTION INTRAVENOUS; SUBCUTANEOUS at 21:10

## 2018-01-24 RX ADMIN — OXYBUTYNIN CHLORIDE 5 MG: 5 TABLET ORAL at 23:19

## 2018-01-24 RX ADMIN — LACOSAMIDE 75 MG: 50 TABLET, FILM COATED ORAL at 21:13

## 2018-01-24 RX ADMIN — FINASTERIDE 5 MG: 5 TABLET, FILM COATED ORAL at 12:16

## 2018-01-24 RX ADMIN — VANCOMYCIN 125 MG: KIT at 17:23

## 2018-01-24 RX ADMIN — INSULIN ASPART 4 UNITS: 100 INJECTION, SOLUTION INTRAVENOUS; SUBCUTANEOUS at 12:38

## 2018-01-24 RX ADMIN — INSULIN ASPART 2 UNITS: 100 INJECTION, SOLUTION INTRAVENOUS; SUBCUTANEOUS at 10:29

## 2018-01-24 RX ADMIN — LEVETIRACETAM 750 MG: 100 SOLUTION ORAL at 21:12

## 2018-01-24 RX ADMIN — VANCOMYCIN 125 MG: KIT at 07:08

## 2018-01-24 RX ADMIN — LINAGLIPTIN 5 MG: 5 TABLET, FILM COATED ORAL at 10:11

## 2018-01-24 RX ADMIN — LEVOTHYROXINE SODIUM 75 MCG: 75 TABLET ORAL at 12:16

## 2018-01-24 RX ADMIN — LEVETIRACETAM 750 MG: 100 SOLUTION ORAL at 10:17

## 2018-01-24 RX ADMIN — APIXABAN 5 MG: 5 TABLET, FILM COATED ORAL at 10:12

## 2018-01-24 RX ADMIN — APIXABAN 5 MG: 5 TABLET, FILM COATED ORAL at 21:12

## 2018-01-24 RX ADMIN — VANCOMYCIN 125 MG: KIT at 13:29

## 2018-01-24 RX ADMIN — LACOSAMIDE 75 MG: 50 TABLET, FILM COATED ORAL at 12:14

## 2018-01-24 RX ADMIN — BUDESONIDE 0.5 MG: 0.5 INHALANT RESPIRATORY (INHALATION) at 20:28

## 2018-01-25 VITALS
RESPIRATION RATE: 18 BRPM | DIASTOLIC BLOOD PRESSURE: 87 MMHG | SYSTOLIC BLOOD PRESSURE: 166 MMHG | BODY MASS INDEX: 24.59 KG/M2 | WEIGHT: 166 LBS | HEIGHT: 69 IN | TEMPERATURE: 97 F | OXYGEN SATURATION: 99 % | HEART RATE: 95 BPM

## 2018-01-25 LAB
GLUCOSE BLDC GLUCOMTR-MCNC: 199 MG/DL (ref 70–130)
GLUCOSE BLDC GLUCOMTR-MCNC: 223 MG/DL (ref 70–130)

## 2018-01-25 PROCEDURE — 94799 UNLISTED PULMONARY SVC/PX: CPT

## 2018-01-25 PROCEDURE — 82962 GLUCOSE BLOOD TEST: CPT

## 2018-01-25 PROCEDURE — 63710000001 INSULIN ASPART PER 5 UNITS: Performed by: INTERNAL MEDICINE

## 2018-01-25 RX ADMIN — LEVOTHYROXINE SODIUM 75 MCG: 75 TABLET ORAL at 08:23

## 2018-01-25 RX ADMIN — VANCOMYCIN 125 MG: KIT at 06:45

## 2018-01-25 RX ADMIN — BUDESONIDE 0.5 MG: 0.5 INHALANT RESPIRATORY (INHALATION) at 06:36

## 2018-01-25 RX ADMIN — VANCOMYCIN 125 MG: KIT at 12:00

## 2018-01-25 RX ADMIN — LEVETIRACETAM 750 MG: 100 SOLUTION ORAL at 08:23

## 2018-01-25 RX ADMIN — OXYBUTYNIN CHLORIDE 5 MG: 5 TABLET ORAL at 08:23

## 2018-01-25 RX ADMIN — ATORVASTATIN CALCIUM 40 MG: 20 TABLET, FILM COATED ORAL at 08:23

## 2018-01-25 RX ADMIN — VANCOMYCIN 125 MG: KIT at 01:00

## 2018-01-25 RX ADMIN — LINAGLIPTIN 5 MG: 5 TABLET, FILM COATED ORAL at 08:23

## 2018-01-25 RX ADMIN — INSULIN ASPART 2 UNITS: 100 INJECTION, SOLUTION INTRAVENOUS; SUBCUTANEOUS at 11:51

## 2018-01-25 RX ADMIN — APIXABAN 5 MG: 5 TABLET, FILM COATED ORAL at 08:23

## 2018-01-25 RX ADMIN — LACOSAMIDE 75 MG: 50 TABLET, FILM COATED ORAL at 08:22

## 2018-01-25 RX ADMIN — INSULIN ASPART 4 UNITS: 100 INJECTION, SOLUTION INTRAVENOUS; SUBCUTANEOUS at 08:24

## 2018-01-25 RX ADMIN — METFORMIN HYDROCHLORIDE 500 MG: 500 TABLET ORAL at 08:23

## 2018-01-25 RX ADMIN — FINASTERIDE 5 MG: 5 TABLET, FILM COATED ORAL at 08:23

## 2018-01-31 ENCOUNTER — OFFICE VISIT (OUTPATIENT)
Dept: CARDIOLOGY | Age: 83
End: 2018-01-31

## 2018-01-31 VITALS — HEART RATE: 86 BPM | SYSTOLIC BLOOD PRESSURE: 108 MMHG | DIASTOLIC BLOOD PRESSURE: 57 MMHG

## 2018-01-31 DIAGNOSIS — Z79.01 ANTICOAGULATED: ICD-10-CM

## 2018-01-31 DIAGNOSIS — I25.10 CORONARY ARTERY DISEASE INVOLVING NATIVE CORONARY ARTERY OF NATIVE HEART WITHOUT ANGINA PECTORIS: Chronic | ICD-10-CM

## 2018-01-31 DIAGNOSIS — I48.21 PERMANENT ATRIAL FIBRILLATION (HCC): Primary | Chronic | ICD-10-CM

## 2018-01-31 PROCEDURE — 99213 OFFICE O/P EST LOW 20 MIN: CPT | Performed by: INTERNAL MEDICINE

## 2018-01-31 PROCEDURE — 93000 ELECTROCARDIOGRAM COMPLETE: CPT | Performed by: INTERNAL MEDICINE

## 2018-01-31 NOTE — PROGRESS NOTES
Subjective:       Lam Viramontes is a 87 y.o. male who here for follow up      Hospital follow-up  HPI    87-year-old male recently admitted to the hospital with the patient was diagnosed to have atrial fibrillation, diabetes and hypertension underwent a stress test which was negative here for the follow-up continues to remain asymptomatic except shortness of breath on minimum exertion and is wheelchair-bound   **Chest pain (non-cardiac) [R07.9] 12/29/2017   • Atrial fibrillation [I48.91] 12/29/2017   • C. difficile diarrhea [A04.72] 12/29/2017   • Coronary artery disease [I25.10] 12/29/2017   • Diabetes mellitus [E11.9] 12/29/2017   • Disease of thyroid gland [E07.9] 12/29/2017   • Hypertension [I10] 12/29/2017   • Pressure ulcer of sacral region [L89.159] 12/29/2017   • Ankylosing spondylitis lumbar region [M45.6] 12/29/2017   • Neurogenic bladder due to ankylosing spondylitis [N31.9] 12/29/2017   • Tracheostomy in place [Z93.0] 12/29/2017   • PEG (percutaneous endoscopic gastrostomy) status [Z93.1]            Problem List Items Addressed This Visit     None        .    The following portions of the patient's history were reviewed and updated as appropriate: allergies, current medications, past family history, past medical history, past social history, past surgical history and problem list.    Past Medical History:   Diagnosis Date   • Ankylosing spondylitis lumbar region    • BPH (benign prostatic hyperplasia)    • C. difficile diarrhea    • Constipation    • Coronary artery disease    • Diabetes mellitus    • Dysphagia    • Hyperlipidemia    • Hypertension    • Hypothyroidism    • MRSA infection    • Neuromuscular dysfunction of bladder    • PAF (paroxysmal atrial fibrillation)    • Pain    • Pressure ulcer of sacral region    • Seizures    • Tracheostomy status     reports that he has quit smoking. He has never used smokeless tobacco. He reports that he does not drink alcohol or use illicit drugs.History  reviewed. No pertinent family history.    Review of Systems  Constitutional: No wt loss, fever, fatigue  Gastrointestinal: No nausea, abdominal pain  Behavioral/Psych: No insomnia or anxiety   Cardiovascular Shortness of breath  Objective:       Physical Exam             Physical Exam  /57  Pulse 86    General appearance: NAD, conversant   Eyes: anicteric sclerae, moist conjunctivae; no lid-lag; PERRLA   HENT: Atraumatic; oropharynx clear with moist mucous membranes and no mucosal ulcerations;  normal hard and soft palate   Neck: Trachea midline; FROM, supple, no thyromegaly or lymphadenopathy   Lungs: CTA, with normal respiratory effort and no intercostal retractions   CV: S1-S2 regular, sys murmurs, no rub, no gallop   Abdomen: Soft, non-tender; no masses or HSM   Extremities: No peripheral edema or extremity lymphadenopathy  Skin: Normal temperature, turgor and texture; no rash, ulcers or subcutaneous nodules   Psych: Appropriate affect, alert and oriented to person, place and time           Cardiographics  @  ECG 12 Lead  Date/Time: 1/31/2018 3:19 PM  Performed by: AYO PALOMINO  Authorized by: AYO PALOMINO   Comparison: compared with previous ECG   Similar to previous ECG  Rhythm: sinus rhythm  Conduction: complete RBBB  Clinical impression: abnormal ECG            Echocardiogram:        Current Outpatient Prescriptions:   •  albuterol (PROVENTIL) (5 MG/ML) 0.5% nebulizer solution, Take 2.5 mg by nebulization Every 4 (Four) Hours As Needed for Wheezing or Shortness of Air., Disp: , Rfl:   •  albuterol (PROVENTIL) (5 MG/ML) 0.5% nebulizer solution, Take 5 mg by nebulization 2 (Two) Times a Day., Disp: , Rfl:   •  apixaban (ELIQUIS) 5 MG tablet tablet, 5 mg by Per G Tube route 2 (Two) Times a Day., Disp: , Rfl:   •  atorvastatin (LIPITOR) 40 MG tablet, 40 mg by Per G Tube route Daily., Disp: , Rfl:   •  Bisacodyl (DULCOLAX RE), Insert 10 mg into the rectum Daily As Needed (constipation).,  Disp: , Rfl:   •  budesonide (PULMICORT) 0.5 MG/2ML nebulizer solution, Take 0.5 mg by nebulization 2 (Two) Times a Day., Disp: , Rfl:   •  finasteride (PROSCAR) 5 MG tablet, 5 mg by Per G Tube route Daily., Disp: , Rfl:   •  Hydrocortisone (ALEXEY'S MAGIC BUTT CREAM), Apply 1 application topically As Needed. Apply to buttocks and scrotum with each brief change, Disp: , Rfl:   •  lacosamide (VIMPAT) 50 MG tablet tablet, 75 mg by Per G Tube route Every 12 (Twelve) Hours., Disp: , Rfl:   •  Lactobacillus (FLORANEX PO), 1 Million Cells by Per G Tube route., Disp: , Rfl:   •  levETIRAcetam (KEPPRA) 750 MG tablet, 750 mg by Per G Tube route 2 (Two) Times a Day., Disp: , Rfl:   •  levothyroxine (SYNTHROID, LEVOTHROID) 75 MCG tablet, 75 mcg by Per G Tube route Daily., Disp: , Rfl:   •  Magnesium Hydroxide (MILK OF MAGNESIA PO), 30 mL by Per G Tube route Daily As Needed (constipation)., Disp: , Rfl:   •  melatonin 5 MG tablet tablet, Take 5 mg by mouth At Night As Needed., Disp: , Rfl:   •  Menthol-Zinc Oxide (CALMOSEPTINE) 0.44-20.6 % ointment, Apply  topically 2 (Two) Times a Day., Disp: , Rfl:   •  midodrine (PROAMATINE) 5 MG tablet, 10 mg by Per G Tube route 3 (Three) Times a Day., Disp: , Rfl:   •  Nutritional Supplements (ISOSOURCE 1.5 JEAN-PIERRE/ML), by Per G Tube route. 60 ml/hr with 20 ml H2O flush for 22 hours.  Start at 1200 and stop at 2200., Disp: , Rfl:   •  Nutritional Supplements (PROMOD) liquid, 30 mL by Per G Tube route 2 (Two) Times a Day., Disp: , Rfl:   •  nystatin (MYCOSTATIN) 479503 UNIT/ML suspension, Swish and swallow 100,000 Units 3 (Three) Times a Day., Disp: , Rfl:   •  oxybutynin (DITROPAN) 5 MG tablet, 5 mg by Per G Tube route 2 (Two) Times a Day., Disp: , Rfl:   •  SENNOSIDES PO, 1 tablet by Per G Tube route 2 (Two) Times a Day., Disp: , Rfl:   •  sitaGLIPtin-metFORMIN (JANUMET)  MG per tablet, 1 tablet by Per G Tube route 2 (Two) Times a Day With Meals., Disp: , Rfl:   •  Sodium Phosphates  (FLEET ENEMA) 7-19 GM/118ML enema, Insert  into the rectum Daily As Needed., Disp: , Rfl:   •  tiotropium (SPIRIVA) 18 MCG per inhalation capsule, Place 1 capsule into inhaler and inhale Daily., Disp: , Rfl:   •  vancomycin 50 MG/ML solution oral solution, Take 2.5 mL by mouth Every 6 (Six) Hours for 37 doses. Indications: Clostridium Difficile Infection, Disp: 92.5 mL, Rfl: 0   Assessment:        Patient Active Problem List   Diagnosis   • Chest pain (non-cardiac)   • Atrial fibrillation   • C. difficile diarrhea   • Coronary artery disease   • Diabetes mellitus   • Disease of thyroid gland   • Hypertension   • Pressure ulcer of sacral region   • Ankylosing spondylitis lumbar region   • Neurogenic bladder due to ankylosing spondylitis   • Tracheostomy in place   • PEG (percutaneous endoscopic gastrostomy) status   • Acute UTI   • Sepsis   • Clostridium difficile colitis            Interpretation Summary   · Equivocal ECG evidence of myocardial ischemia.Negative clinical evidence of myocardial ischemia.  · Left ventricular ejection fraction is normal (Calculated EF = 64%).  · Myocardial perfusion imaging indicates a normal myocardial perfusion study with no evidence of ischemia.  · Impressions are consistent with a low risk study.            Plan:            ICD-10-CM ICD-9-CM   1. Permanent atrial fibrillation I48.2 427.31   2. Coronary artery disease involving native coronary artery of native heart without angina pectoris I25.10 414.01   3. Anticoagulated Z79.01 V58.61     1. Permanent atrial fibrillation  Under control    2. Coronary artery disease involving native coronary artery of native heart without angina pectoris  No chest pains    3. Anticoagulated  Counseling has been done  COUNSELING:    Lam Velasquez was given to patient for the following topics: diagnostic results, risk factor reductions, impressions, risks and benefits of treatment options and importance of treatment compliance .        SMOKING COUNSELING:    Counseling given: Not Answered      EMR Dragon/Transcription disclaimer:   Much of this encounter note is an electronic transcription/translation of spoken language to printed text. The electronic translation of spoken language may permit erroneous, or at times, nonsensical words or phrases to be inadvertently transcribed; Although I have reviewed the note for such errors, some may still exist.

## 2018-02-13 ENCOUNTER — HOSPITAL ENCOUNTER (EMERGENCY)
Facility: HOSPITAL | Age: 83
Discharge: HOME | End: 2018-02-13
Attending: EMERGENCY MEDICINE | Admitting: EMERGENCY MEDICINE

## 2018-02-13 VITALS
BODY MASS INDEX: 23.7 KG/M2 | TEMPERATURE: 97 F | HEART RATE: 73 BPM | OXYGEN SATURATION: 96 % | HEIGHT: 69 IN | RESPIRATION RATE: 18 BRPM | DIASTOLIC BLOOD PRESSURE: 79 MMHG | SYSTOLIC BLOOD PRESSURE: 144 MMHG | WEIGHT: 160 LBS

## 2018-02-13 DIAGNOSIS — N47.2 PARAPHIMOSIS: Primary | ICD-10-CM

## 2018-02-13 LAB
BACTERIA UR QL AUTO: ABNORMAL /HPF
BILIRUB UR QL STRIP: NEGATIVE
CLARITY UR: ABNORMAL
COLOR UR: YELLOW
GLUCOSE UR STRIP-MCNC: NEGATIVE MG/DL
HGB UR QL STRIP.AUTO: ABNORMAL
HYALINE CASTS UR QL AUTO: ABNORMAL /LPF
KETONES UR QL STRIP: NEGATIVE
LEUKOCYTE ESTERASE UR QL STRIP.AUTO: ABNORMAL
NITRITE UR QL STRIP: POSITIVE
PH UR STRIP.AUTO: 7.5 [PH] (ref 5–8)
PROT UR QL STRIP: ABNORMAL
RBC # UR: ABNORMAL /HPF
REF LAB TEST METHOD: ABNORMAL
SP GR UR STRIP: 1.02 (ref 1–1.03)
SQUAMOUS #/AREA URNS HPF: ABNORMAL /HPF
UROBILINOGEN UR QL STRIP: ABNORMAL
WBC UR QL AUTO: ABNORMAL /HPF

## 2018-02-13 PROCEDURE — 99284 EMERGENCY DEPT VISIT MOD MDM: CPT

## 2018-02-13 PROCEDURE — 87086 URINE CULTURE/COLONY COUNT: CPT | Performed by: NURSE PRACTITIONER

## 2018-02-13 PROCEDURE — 87186 SC STD MICRODIL/AGAR DIL: CPT | Performed by: NURSE PRACTITIONER

## 2018-02-13 PROCEDURE — 87181 SC STD AGAR DILUTION PER AGT: CPT | Performed by: NURSE PRACTITIONER

## 2018-02-13 PROCEDURE — 81001 URINALYSIS AUTO W/SCOPE: CPT | Performed by: NURSE PRACTITIONER

## 2018-02-13 NOTE — ED NOTES
"Patient states \"I lose my insurance at noon today so can you please get a doctor in here or something so I can get out of here?\" Apologized to patient for wait and assurance given he will be seen by a doctor as quickly as possible.     Delphine Vu RN  02/13/18 0903    "

## 2018-02-13 NOTE — ED NOTES
"Per EMS, Pt has been having swelling underneath the glans of the penis since last night. Pt states, \"it hurts a little bit.\" Per EMS, they emptied a whole \"container\" plus some prior to transporting Pt to the ED. Pt has a clarke in place.     Suki Pleitez RN  02/13/18 0808    "

## 2018-02-13 NOTE — ED PROVIDER NOTES
EMERGENCY DEPARTMENT ENCOUNTER    CHIEF COMPLAINT  Chief Complaint: Swelling  History given by: Patient  History limited by: None  Room Number: 19/19  PMD: Seun Torres MD      HPI:  Pt is a 87 y.o. male who presents complaining of swelling to the head of the penis after the pt had a clarke catheter placed two days ago.Pt did not have the foreskin placed over the glans, but did requests this of the staff that placed the catheter. Pt is not circumcised. Denies penile pain or any other complaints.No Fever and is otherwise acting his normal self.    Duration:  Gradual  Onset: two days ago  Timing: after cath placement  Location: Penile glands  Radiation: None  Quality: swelling  Intensity/Severity: moderate  Progression: constant  Associated Symptoms: None  Treatment before arrival: cath placement    PAST MEDICAL HISTORY  Active Ambulatory Problems     Diagnosis Date Noted   • Chest pain (non-cardiac) 12/29/2017   • Atrial fibrillation 12/29/2017   • C. difficile diarrhea 12/29/2017   • Coronary artery disease 12/29/2017   • Diabetes mellitus 12/29/2017   • Disease of thyroid gland 12/29/2017   • Hypertension 12/29/2017   • Pressure ulcer of sacral region 12/29/2017   • Ankylosing spondylitis lumbar region 12/29/2017   • Neurogenic bladder due to ankylosing spondylitis 12/29/2017   • Tracheostomy in place 12/29/2017   • PEG (percutaneous endoscopic gastrostomy) status 12/29/2017   • Acute UTI 01/19/2018   • Sepsis 01/19/2018   • Clostridium difficile colitis 01/22/2018   • Anticoagulated 01/31/2018     Resolved Ambulatory Problems     Diagnosis Date Noted   • No Resolved Ambulatory Problems     Past Medical History:   Diagnosis Date   • Ankylosing spondylitis lumbar region    • BPH (benign prostatic hyperplasia)    • C. difficile diarrhea    • Constipation    • Coronary artery disease    • Diabetes mellitus    • Dysphagia    • Hyperlipidemia    • Hypertension    • Hypothyroidism    • MRSA infection    •  Neuromuscular dysfunction of bladder    • PAF (paroxysmal atrial fibrillation)    • Pain    • Pressure ulcer of sacral region    • Seizures    • Tracheostomy status        PAST SURGICAL HISTORY  Past Surgical History:   Procedure Laterality Date   • CARDIAC SURGERY     • CORONARY ARTERY BYPASS GRAFT      x 5   • GTUBE INSERTION     • TRACHEOSTOMY         FAMILY HISTORY  History reviewed. No pertinent family history.    SOCIAL HISTORY  Social History     Social History   • Marital status:      Spouse name: N/A   • Number of children: N/A   • Years of education: N/A     Occupational History   • Not on file.     Social History Main Topics   • Smoking status: Former Smoker   • Smokeless tobacco: Never Used   • Alcohol use No   • Drug use: No   • Sexual activity: Defer     Other Topics Concern   • Not on file     Social History Narrative       ALLERGIES  Review of patient's allergies indicates no known allergies.    REVIEW OF SYSTEMS  Review of Systems   Constitutional: Negative for chills and fever.   HENT: Negative for sore throat.    Gastrointestinal: Negative for nausea and vomiting.   Genitourinary: Negative for dysuria and penile pain.        Positive for penile swelling   Musculoskeletal: Negative for back pain.   Skin: Negative for rash.   Psychiatric/Behavioral: The patient is not nervous/anxious.        PHYSICAL EXAM  ED Triage Vitals   Temp Heart Rate Resp BP SpO2   02/13/18 0804 02/13/18 0802 02/13/18 0802 02/13/18 0802 02/13/18 0802   97 °F (36.1 °C) 87 18 158/80 96 %      Temp src Heart Rate Source Patient Position BP Location FiO2 (%)   02/13/18 0804 02/13/18 0802 02/13/18 0923 02/13/18 0923 --   Tympanic Monitor Lying Left arm        Physical Exam   Constitutional: No distress.   Elderly, NAD, chronically ill. Frail   HENT:   Head: Normocephalic and atraumatic.   Eyes: EOM are normal.   Neck: Normal range of motion.   Cardiovascular: Normal heart sounds.    Pulmonary/Chest: No respiratory distress.    Trach in place   Abdominal: There is no tenderness.   G tube to LUQ   Genitourinary:   Genitourinary Comments: Chandler cath in place in clear yellow urine. Swelling of the glands of the penis and it is very pale. Paraphimosis   Musculoskeletal: He exhibits no edema.   Neurological: He is alert.   Skin: Skin is warm and dry.   Nursing note and vitals reviewed.    Labs:  Lab Results (last 24 hours)     Procedure Component Value Units Date/Time    Urinalysis With / Culture If Indicated - Urine, Catheter [[id number]]  (Abnormal) Collected:  02/13/18 0927    Specimen:  Urine from Urine, Catheter Updated:  02/13/18 1005     Color, UA Yellow     Appearance, UA Cloudy (A)     pH, UA 7.5     Specific Gravity, UA 1.016     Glucose, UA Negative     Ketones, UA Negative     Bilirubin, UA Negative     Blood, UA Small (1+) (A)     Protein,  mg/dL (2+) (A)     Leuk Esterase, UA Large (3+) (A)     Nitrite, UA Positive (A)     Urobilinogen, UA 0.2 E.U./dL    Urinalysis, Microscopic Only - Urine, Clean Catch [[id number]]  (Abnormal) Collected:  02/13/18 0927    Specimen:  Urine from Urine, Catheter Updated:  02/13/18 1006     RBC, UA 3-5 (A) /HPF      WBC, UA Too Numerous to Count (A) /HPF      Bacteria, UA 4+ (A) /HPF      Squamous Epithelial Cells, UA 0-2 /HPF      Hyaline Casts, UA 3-6 /LPF      Methodology Automated Microscopy    Urine Culture - Urine, Urine, Clean Catch [[id number]] Collected:  02/13/18 0927    Specimen:  Urine from Urine, Catheter Updated:  02/13/18 1004          I reviewed the results    PROCEDURES  Procedures      PROGRESS AND CONSULTS  ED Course   Value Comment By Time   Leuk Esterase, UA: (!) Large (3+) Patient has a chronic Chandler that was changed 2 days ago.  The patient has no fevers.  We will check a culture of the urinalysis I suspect that this is chronic contamination Mitul Mccauley MD 02/13 [id number]: Labs ordered pror to my evaluation.     0940: I provided pressure to the glands of the penis to  reduce swelling and applied pressure to the foreskin and pulled the foreskin over the penis. Pt tolerated procedure well with no complications.    0952: Rechecked pt. Discussed ED findings of paraphimosis and plan for discharge at this time, as there is no indication for admission. Pt understands and agrees with the plan of care. All questions addressed at this time.     1022: Rechecked pt. Pt has had increased color to the glands of his penis with decreased swelling. Discussed plan for discharge at this time, as there is no indication for admission. Pt understands and agrees with the plan of care. All questions addressed at this time.   12:05 PM  I spoke with Dr. Feliciano was very file with this patient.  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  He has had paraphimosis in the past.  He does not recommended that I start him on oral antibiotics at this time.  He is going to follow up with the urine culture in 2 days and he will call in antibiotics if needed.  Also follow up with him in his in the office.  MEDICAL DECISION MAKING  Results were reviewed/discussed with the patient and they were also made aware of online access. Pt also made aware that some labs, such as cultures, will not be resulted during ER visit and follow up with PMD is necessary.     MDM       DIAGNOSIS  Final diagnoses:   Paraphimosis       DISPOSITION  DISCHARGE    Patient discharged in stable condition.    Reviewed implications of results, diagnosis, meds, responsibility to follow up, warning signs and symptoms of possible worsening, potential complications and reasons to return to ER, including.    Patient/Family voiced understanding of above instructions.    Discussed plan for discharge, as there is no emergent indication for admission.  Pt/family is agreeable and understands need for follow up and repeat testing.  Pt is aware that discharge does not mean that nothing is wrong but it indicates no emergency is present  that requires admission and they must continue care with follow-up as given below or physician of their choice.     FOLLOW-UP  Seun Torres MD  [address] [facility] [address] [phone]    Schedule an appointment as soon as possible for a visit in 2 days   Return if swelling of glans of penis returns, pain, fever, any concerns.    Suman Blackburn MD  [address] [place] [address] [place] [address] [phone]    In 3 days  Return if any pain, swelling returns or any concerns.         Medication List      Notice     No changes were made to your prescriptions during this visit.        Latest Documented Vital Signs:  As of 12:09 PM  BP- 144/79 HR- 73 Temp- 97 °F (36.1 °C) (Tympanic) O2 sat- 96%    --  Documentation assistance provided by dirk Kowalski for Mitul Mccauley MD.  Information recorded by the scribe was done at my direction and has been verified and validated by me.        Karley Kowalski  02/13/18 1040       Mitul Mccauley MD  02/13/18 3998

## 2018-02-17 LAB
BACTERIA SPEC AEROBE CULT: ABNORMAL

## 2018-03-04 PROCEDURE — 87493 C DIFF AMPLIFIED PROBE: CPT | Performed by: INTERNAL MEDICINE

## 2018-03-05 ENCOUNTER — LAB REQUISITION (OUTPATIENT)
Dept: LAB | Facility: HOSPITAL | Age: 83
End: 2018-03-05

## 2018-03-05 DIAGNOSIS — Z00.00 ENCOUNTER FOR GENERAL ADULT MEDICAL EXAMINATION WITHOUT ABNORMAL FINDINGS: ICD-10-CM

## 2018-03-05 LAB — C DIFF TOX GENS STL QL NAA+PROBE: POSITIVE

## 2018-03-15 ENCOUNTER — TRANSCRIBE ORDERS (OUTPATIENT)
Dept: ADMINISTRATIVE | Facility: HOSPITAL | Age: 83
End: 2018-03-15

## 2018-03-15 DIAGNOSIS — R13.10 DYSPHAGIA, UNSPECIFIED TYPE: Primary | ICD-10-CM

## 2018-03-21 ENCOUNTER — APPOINTMENT (OUTPATIENT)
Dept: GENERAL RADIOLOGY | Facility: HOSPITAL | Age: 83
End: 2018-03-21

## 2018-03-27 ENCOUNTER — HOSPITAL ENCOUNTER (OUTPATIENT)
Dept: GENERAL RADIOLOGY | Facility: HOSPITAL | Age: 83
Discharge: HOME | End: 2018-03-27
Admitting: OTOLARYNGOLOGY

## 2018-03-27 DIAGNOSIS — R13.10 DYSPHAGIA, UNSPECIFIED TYPE: ICD-10-CM

## 2018-03-27 PROCEDURE — 63710000001 BARIUM SULFATE 96 % RECONSTITUTED SUSPENSION: Performed by: OTOLARYNGOLOGY

## 2018-03-27 PROCEDURE — G8997 SWALLOW GOAL STATUS: HCPCS

## 2018-03-27 PROCEDURE — 63710000001 BARIUM SULFATE 40 % SUSPENSION: Performed by: OTOLARYNGOLOGY

## 2018-03-27 PROCEDURE — 74230 X-RAY XM SWLNG FUNCJ C+: CPT

## 2018-03-27 PROCEDURE — A9270 NON-COVERED ITEM OR SERVICE: HCPCS | Performed by: OTOLARYNGOLOGY

## 2018-03-27 PROCEDURE — 63710000001 BARIUM SULFATE 98 % RECONSTITUTED SUSPENSION: Performed by: OTOLARYNGOLOGY

## 2018-03-27 PROCEDURE — 92611 MOTION FLUOROSCOPY/SWALLOW: CPT

## 2018-03-27 PROCEDURE — G8996 SWALLOW CURRENT STATUS: HCPCS

## 2018-03-27 RX ADMIN — BARIUM SULFATE 50 ML: 400 SUSPENSION ORAL at 13:53

## 2018-03-27 RX ADMIN — BARIUM SULFATE 50 ML: 400 SUSPENSION ORAL at 13:52

## 2018-03-27 RX ADMIN — BARIUM SULFATE 65 ML: 960 POWDER, FOR SUSPENSION ORAL at 13:52

## 2018-03-27 RX ADMIN — BARIUM SULFATE 4 ML: 980 POWDER, FOR SUSPENSION ORAL at 13:52

## 2018-03-28 NOTE — MBS/VFSS/FEES
Outpatient Speech Language Pathology   Adult Swallow Initial Evaluation  [facility]     Patient Name: Lam Viramontes  : 1930  MRN: [id number]  Today's Date: 3/28/2018         Visit Date: 2018   Patient Active Problem List   Diagnosis   • Chest pain (non-cardiac)   • Atrial fibrillation   • C. difficile diarrhea   • Coronary artery disease   • Diabetes mellitus   • Disease of thyroid gland   • Hypertension   • Pressure ulcer of sacral region   • Ankylosing spondylitis lumbar region   • Neurogenic bladder due to ankylosing spondylitis   • Tracheostomy in place   • PEG (percutaneous endoscopic gastrostomy) status   • Acute UTI   • Sepsis   • Clostridium difficile colitis   • Anticoagulated        Past Medical History:   Diagnosis Date   • Ankylosing spondylitis lumbar region    • BPH (benign prostatic hyperplasia)    • C. difficile diarrhea    • Constipation    • Coronary artery disease    • Diabetes mellitus    • Dysphagia    • Hyperlipidemia    • Hypertension    • Hypothyroidism    • MRSA infection    • Neuromuscular dysfunction of bladder    • PAF (paroxysmal atrial fibrillation)    • Pain    • Pressure ulcer of sacral region    • Seizures    • Tracheostomy status         Past Surgical History:   Procedure Laterality Date   • CARDIAC SURGERY     • CORONARY ARTERY BYPASS GRAFT      x 5   • GTUBE INSERTION     • TRACHEOSTOMY           Visit Dx:     ICD-10-CM ICD-9-CM   1. Dysphagia, unspecified type R13.10 787.20     SPEECH-LANGUAGE PATHOLOGY EVALUTION - VFSS  Subjective: The patient was seen on this date for a VFSS(Videofluoroscopic Swallowing Study).  Patient was alert and cooperative.    Objective: Risks/benefits were reviewed with the patient and family, and consent was obtained. The study was completed with SLP present and Radiologist review. The patient was seen in lateral view(s). Textures given included thin liquid, nectar thick liquid, honey thick liquid and puree consistency.  Assessment:  Trach present, no occlusion present during VFSS. Pt presents with moderate oropharyngeal dysphagia characterized  premature spillage, reducded base of tongue retraction, fatigue, decrased pharyngeal constriction, and decreased hyolaryngeal elevation/excursion. Pt with inconsistent premature spillage with thin and nectar thick liquids tot he level of the vallecula. Pt with penetration thin during the swallow, trace aspiration after the swallow secondary to mild-mod residue in pyrifroms and vestibule. No penetration or aspiration with nectar thick or honey thick liquids. Pt initially with no penetraiton or aspiration with puree, pt quick to fatigue resulting in mod-severe diffuse pharyngeal residue. Pt unable to initiate multiple swallows secondary to fatigue. Pt with continuous penetration puree and eventual silent aspiration. Pt eventually able to achieve repeat swallow with trach occluded, aided in clearing puree residuals. Pt given nectar thick liquid wash at that time, no penetration or aspiraiton observed with nectar thick liquids at end of study.   SLP Findings: Patient presents with moderate oropharyngeal dysphagia. Pt quick to fatigue this date. Encourage pt to swallow secretions, pt cues able to achieve clearance and dry/clear vocal quality.   Recommendations: Diet Textures: NPO. Nectar thick liquid trials with SLP only.  Medications should be taken  by alternate means. May have water and Ice sparingly after oral care, under staff or family supervision and with the recommended strategies for safe swallowing.  Recommended Strategies: . Oral care.  Other Recommended Evaluations: Repeat VFSS with PMV present, 6-9 weeks with continued dysphagia therapy.     Dysphagia therapy is recommended. Rationale: Return to PO.                SLP Adult Swallow Evaluation - 03/27/18 1430        Rehab Evaluation    Document Type evaluation  -OC    Total Evaluation Minutes, SLP 75  -OC    Subjective Information no complaints   -OC    Patient Observations alert;cooperative;agree to therapy  -OC    Patient Effort good  -OC       General Information    Patient Profile Reviewed yes  -OC    Pertinent History Of Current Problem s/p trach/PEG 3 years  -OC    Current Method of Nutrition NPO;gastrostomy feedings  -OC    Prior Level of Function-Swallowing other (see comments)   NPO 3 years  -OC    Plans/Goals Discussed with patient;spouse/S.O.;agreed upon  -OC       Pain Assessment    Additional Documentation Pain Scale: Numbers Pre/Post-Treatment (Group)  -OC       Pain Scale: Numbers Pre/Post-Treatment    Pain Scale: Numbers, Pretreatment 0/10 - no pain  -OC    Pain Scale: Numbers, Post-Treatment 0/10 - no pain  -OC       MBS/VFSS    Utensils Used spoon;cup  -OC    Consistencies Trialed thin liquids;nectar/syrup-thick liquids;honey-thick liquids;pureed  -OC       MBS/VFSS Interpretation    Oral Prep Phase WFL  -OC    Oral Transit Phase WFL  -OC    Oral Residue impaired  -OC    VFSS Summary Pt presents with moderate oropharyngeal dysphagia characterized  premature spillage, reducded base of tongue retraction, fatigue, decrased pharyngeal constriction, and decreased hyolaryngeal elevation/excursion. Pt with inconsistent premature spillage with thin and nectar thick liquids tot he level of the vallecula. Pt with penetration thin during the swallow, trace aspiration after the swallow secondary to mild-mod residue in pyrifroms and vestibule. No penetration or aspiration with nectar thick or honey thick liquids. Pt initially with no penetraiton or aspiration with puree, pt quick to fatigue resulting in mod-severe diffuse pharyngeal residue. Pt unable to initiate multiple swallows secondary to fatigue. Pt with continuous penetration puree and eventual silent aspiration. Pt eventually able to achieve repeat swallow with trach occluded, aided in clearing puree residuals. Pt given nectar thick liquid wash at that time, no penetration or aspiraiton observed  with nectar thick liquids at end of study.  -OC       Oral Residue    Impaired Oral Residue lingual residue  -OC    Lingual Residue pudding/puree  -OC       Initiation of Pharyngeal Swallow    Pharyngeal Phase impaired pharyngeal phase of swallowing  -OC    Penetration After the Swallow pudding/puree;secondary to residue;in pyriform sinuses  -OC    Aspiration After the Swallow pudding/puree;in pyriform sinuses;in laryngeal vestibule;secondary to residue  -OC    Response to Penetration no response  -OC    Response to Aspiration no response, silent aspiration  -OC    Pharyngeal Residue pudding/puree;secondary to reduced base of tongue retraction;secondary to reduced posterior pharyngeal wall stripping;secondary to reduced laryngeal elevation;secondary to reduced hyolaryngeal excursion  -OC    Response to Residue unable to clear residue;other (see comments)   fatigue, eventual clearance over time with multiple swallows  -OC    Attempted Compensatory Maneuvers multiple swallows  -OC       SLP Communication to Radiology    Summary Statement Pt presents with moderate oropharyngeal dysphagia characterized  premature spillage, reducded base of tongue retraction, fatigue, decrased pharyngeal constriction, and decreased hyolaryngeal elevation/excursion. Pt with inconsistent premature spillage with thin and nectar thick liquids tot he level of the vallecula. Pt with penetration thin during the swallow, trace aspiration after the swallow secondary to mild-mod residue in pyrifroms and vestibule. No penetration or aspiration with nectar thick or honey thick liquids. Pt initially with no penetraiton or aspiration with puree, pt quick to fatigue resulting in mod-severe diffuse pharyngeal residue. Pt unable to initiate multiple swallows secondary to fatigue. Pt with continuous penetration puree and eventual silent aspiration. Pt eventually able to achieve repeat swallow with trach occluded, aided in clearing puree residuals. Pt  given nectar thick liquid wash at that time, no penetration or aspiraiton observed with nectar thick liquids at end of study.  -OC       Clinical Impression    SLP Swallowing Diagnosis functional oral phase;pharyngeal dysfunction  -OC    Functional Impact risk of aspiration/pneumonia  -OC    Rehab Potential/Prognosis, Swallowing adequate, monitor progress closely  -OC    Criteria for Skilled Therapeutic Interventions Met demonstrates skilled criteria;other (see comments)   recommend continued home health and eventual out patient tx  -OC       Recommendations    SLP Diet Recommendation NPO;long term alternate methods of nutrition/hydration   [place] trials with SLP only  -OC    SLP Rec. for Method of Medication Administration meds via alternate route  -OC      User Key  (r) = Recorded By, (t) = Taken By, (c) = Cosigned By    Initials Name Provider Type    OC Lashonda Kaiser MA,MARISSA-SLP Speech and Language Pathologist                              OP SLP Education     Row Name 03/27/18 1430       Education    Barriers to Learning No barriers identified  -OC    Education Provided Described results of evaluation;Patient expressed understanding of evaluation;Family/caregivers expressed understanding of evaluation  -OC    Assessed Learning needs;Learning motivation;Learning preferences;Learning readiness  -OC    Learning Motivation Strong  -OC    Learning Method Demonstration  -OC    Teaching Response Verbalized understanding  -OC    Education Comments Provided eduction for nectar thick liquids trials with SLP only.  -OC      User Key  (r) = Recorded By, (t) = Taken By, (c) = Cosigned By    Initials Name Effective Dates    RODNEY Kaiser MA, CCC-SLP 04/05/17 -                     OP SLP Assessment/Plan - 03/27/18 1430        SLP Assessment    Functional Problems Swallowing  -OC    Impact on Function: Swallowing Risk of aspiration;Risk of pneumonia  -OC    Clinical Impression: Swallowing Moderate:  -OC    Please refer to paper  survey for additional self-reported information No  -OC    Please refer to items scanned into chart for additional diagnostic informaiton and handouts as provided by clinician No  -OC    Prognosis Good (comment)  -OC    Patient/caregiver participated in establishment of treatment plan and goals Yes  -OC    Patient would benefit from skilled therapy intervention Yes   Continued home health and eventual out patient as appropriate  -OC       SLP Plan    Frequency TBD  -OC    Duration 3-6 months  -OC    Planned CPT's? SLP MBS: [id number]  -OC    Expected Duration Therapy Session - minutes 60-75 minutes  -OC    Plan Comments Continued therapy  -OC      User Key  (r) = Recorded By, (t) = Taken By, (c) = Cosigned By    Initials Name Provider Type    OC Lashonda Kaiser MA,CCC-SLP Speech and Language Pathologist              SLP Outcome Measures (last 72 hours)      SLP Outcome Measures     Row Name 03/27/18 1430             SLP Outcome Measures    Outcome Measure Used? Adult NOMS  -OC         FCM Scores    FCM Chosen Swallowing  -OC      Swallowing FCM Score 2  -OC        User Key  (r) = Recorded By, (t) = Taken By, (c) = Cosigned By    Initials Name Effective Dates    OC Lashonda Kaiser MA,MARISSA-SLP 04/05/17 -                Time Calculation:   SLP Start Time: 1315  SLP Stop Time: 1430  SLP Time Calculation (min): 75 min    Therapy Charges for Today     Code Description Service Date Service Provider Modifiers Qty    79890439809 HC ST SWALLOWING CURRENT STATUS 3/27/2018 Lashonda Kaiser MA,CCC-SLP GN, CM 1    43058372799 HC ST SWALLOWING PROJECTED 3/27/2018 Lashonda Kaiser MACCC-SLP GN, CM 1    34703032716 HC ST MOTION FLUORO EVAL SWALLOW 5 3/27/2018 Lashonda Kaiser MACCC-SLP GN, KX 1          SLP G-Codes  SLP NOMS Used?: Yes  Functional Limitations: Swallowing  Swallow Current Status (): At least 80 percent but less than 100 percent impaired, limited or restricted  Swallow Goal Status (): At least 80 percent but less than 100 percent  impaired, limited or restricted        Lashonda Kaiser MA,CCC-SLP  3/28/2018

## 2018-08-13 ENCOUNTER — TRANSCRIBE ORDERS (OUTPATIENT)
Dept: ADMINISTRATIVE | Facility: HOSPITAL | Age: 83
End: 2018-08-13

## 2018-08-13 DIAGNOSIS — R13.10 DYSPHAGIA, UNSPECIFIED TYPE: Primary | ICD-10-CM

## 2018-08-20 ENCOUNTER — HOSPITAL ENCOUNTER (OUTPATIENT)
Dept: GENERAL RADIOLOGY | Facility: HOSPITAL | Age: 83
Discharge: HOME | End: 2018-08-20
Admitting: OTOLARYNGOLOGY

## 2018-08-20 DIAGNOSIS — R13.10 DYSPHAGIA, UNSPECIFIED TYPE: ICD-10-CM

## 2018-08-20 PROCEDURE — A9270 NON-COVERED ITEM OR SERVICE: HCPCS | Performed by: OTOLARYNGOLOGY

## 2018-08-20 PROCEDURE — 63710000001 BARIUM SULFATE 98 % RECONSTITUTED SUSPENSION: Performed by: OTOLARYNGOLOGY

## 2018-08-20 PROCEDURE — G8998 SWALLOW D/C STATUS: HCPCS | Performed by: SPEECH-LANGUAGE PATHOLOGIST

## 2018-08-20 PROCEDURE — G8997 SWALLOW GOAL STATUS: HCPCS | Performed by: SPEECH-LANGUAGE PATHOLOGIST

## 2018-08-20 PROCEDURE — 63710000001 BARIUM SULFATE 96 % RECONSTITUTED SUSPENSION: Performed by: OTOLARYNGOLOGY

## 2018-08-20 PROCEDURE — 92611 MOTION FLUOROSCOPY/SWALLOW: CPT | Performed by: SPEECH-LANGUAGE PATHOLOGIST

## 2018-08-20 PROCEDURE — 74230 X-RAY XM SWLNG FUNCJ C+: CPT

## 2018-08-20 PROCEDURE — 63710000001 BARIUM SULFATE 40 % SUSPENSION: Performed by: OTOLARYNGOLOGY

## 2018-08-20 RX ADMIN — BARIUM SULFATE 50 ML: 400 SUSPENSION ORAL at 10:53

## 2018-08-20 RX ADMIN — BARIUM SULFATE 65 ML: 960 POWDER, FOR SUSPENSION ORAL at 10:52

## 2018-08-20 RX ADMIN — BARIUM SULFATE 4 ML: 980 POWDER, FOR SUSPENSION ORAL at 10:52

## 2018-08-20 NOTE — MBS/VFSS/FEES
Outpatient Speech Language Pathology   Adult Swallow Initial Evaluation-VFSS  [facility]     Patient Name: Lam Viramontes  : 1930  MRN: [id number]  Today's Date: 2018         Visit Date: 2018     SPEECH-LANGUAGE PATHOLOGY EVALUTION - VFSS  Subjective: The patient was seen on this date for a VFSS(Videofluoroscopic Swallowing Study).  Patient was alert and cooperative.    Significant history:   Objective: Risks/benefits were reviewed with the patient and family, and consent was obtained. The study was completed with SLP present and Radiologist review. The patient was seen in lateral view(s). Textures given included thin liquid, nectar thick liquid, honey thick liquid and puree consistency.  Assessment:  Pt presents with moderate pharyngeal residue characterized by aspiration of thin and pharyngeal residue.  Penetration with nectar also noted inconsistently.   Reduced tongue base retraction, laryngeal elevation, and anterior hyoid movement all noted during study.  Premature spillage noted to pyriforms with nectar and thin; to valleculae with honey and puree.  Feel pt most at risk for aspiration of pharyngeal residue.  Though residue not severe, it was noted to progress from valleculae and pyriforms into vestibule extending to vocal cords.  Pt was unable to clear penetration and eventual aspiration noted.       SLP Findings: Patient presents with moderate oropharyngeal dysphagia.   Comments: Risk of aspiration elevated d/t difficulty with management of secretions.  Recommendations: Diet Textures: NPO. Medications should be takenby alternate means. May have Ice sparingly under staff or family supervision and with the recommended strategies for safe swallowing.  May have small amounts of honey thick liquids with SLP only in therapy at discretion of treating SLP.  Recommended Strategies: To be used as directed by treating SLP:  Upright for PO, small bites and sips, double swallow with any oral  presentation, no straw and supervision with all PO. Oral care before breakfast, after all meals and PRN.  Dysphagia therapy is recommended. Rationale: Qualitative improvements noted when studies compared.  Reinforce importance w/ pt HEP.  Also question if possible integration of e-stim with current dysphagia therapy would be of benefit to pt.        Patient Active Problem List   Diagnosis   • Chest pain (non-cardiac)   • Atrial fibrillation (CMS/HCC)   • C. difficile diarrhea   • Coronary artery disease   • Diabetes mellitus (CMS/HCC)   • Disease of thyroid gland   • Hypertension   • Pressure ulcer of sacral region   • Ankylosing spondylitis lumbar region (CMS/HCC)   • Neurogenic bladder due to ankylosing spondylitis   • Tracheostomy in place (CMS/HCC)   • PEG (percutaneous endoscopic gastrostomy) status (CMS/HCC)   • Acute UTI   • Sepsis (CMS/HCC)   • Clostridium difficile colitis   • Anticoagulated        Past Medical History:   Diagnosis Date   • Ankylosing spondylitis lumbar region (CMS/HCC)    • BPH (benign prostatic hyperplasia)    • C. difficile diarrhea    • Constipation    • Coronary artery disease    • Diabetes mellitus (CMS/HCC)    • Dysphagia    • Hyperlipidemia    • Hypertension    • Hypothyroidism    • MRSA infection    • Neuromuscular dysfunction of bladder    • PAF (paroxysmal atrial fibrillation) (CMS/HCC)    • Pain    • Pressure ulcer of sacral region    • Seizures (CMS/HCC)    • Tracheostomy status (CMS/HCC)         Past Surgical History:   Procedure Laterality Date   • CARDIAC SURGERY     • CORONARY ARTERY BYPASS GRAFT      x 5   • GTUBE INSERTION     • TRACHEOSTOMY           Visit Dx:     ICD-10-CM ICD-9-CM   1. Dysphagia, unspecified type R13.10 787.20                 SLP Adult Swallow Evaluation - 08/20/18 1015        Rehab Evaluation    Document Type evaluation  -SA    Subjective Information no complaints  -SA    Patient Observations alert;cooperative  -SA    Patient Effort good  -SA     Symptoms Noted During/After Treatment none  -SA       General Information    Pertinent History Of Current Problem s/p trach/peg 4 yrs; unable to wear PMV  -SA    Current Method of Nutrition NPO;gastrostomy feedings  -SA    Prior Level of Function-Swallowing NPO;other (see comments)   4 yrs  -SA    Plans/Goals Discussed with patient;spouse/S.O.  -SA    Barriers to Rehab medically complex  -SA    Patient's Goals for Discharge return to PO diet  -SA    Family Goals for Discharge patient able to return to PO diet  -SA       Pain Assessment    Additional Documentation Pain Scale: Numbers Pre/Post-Treatment (Group)  -SA       Pain Scale: Numbers Pre/Post-Treatment    Pain Scale: Numbers, Pretreatment 0/10 - no pain  -SA    Pain Scale: Numbers, Post-Treatment 0/10 - no pain  -SA       MBS/VFSS    Utensils Used spoon;cup  -SA    Consistencies Trialed pureed;thin liquids;nectar/syrup-thick liquids;honey-thick liquids  -SA       MBS/VFSS Interpretation    Oral Prep Phase WFL  -SA    Oral Transit Phase impaired  -SA    Oral Residue impaired  -SA       Oral Transit Phase    Impaired Oral Transit Phase premature spillage of liquids into pharynx  -SA    Premature Spillage of Liquids into Pharynx thin liquids;nectar-thick liquids;honey-thick liquids  -SA       Oral Residue    Impaired Oral Residue lingual residue  -SA    Lingual Residue pudding/puree  -SA    Response to Oral Residue other (see comments);with cued swallow   able to reduce with cued double swallow  -SA       Initiation of Pharyngeal Swallow    Initiation of Pharyngeal Swallow bolus in valleculae;bolus in pyriform sinuses  -SA    Pharyngeal Phase impaired pharyngeal phase of swallowing  -SA    Penetration During the Swallow nectar-thick liquids;thin liquids  -SA    Aspiration During the Swallow thin liquids  -SA    Penetration After the Swallow secondary to residue;pudding/puree;honey-thick liquids;nectar-thick liquids;thin liquids  -SA    Aspiration After the  Swallow pudding/puree;honey-thick liquids  -SA    Response to Penetration no response  -SA    Response to Aspiration no response, silent aspiration  -SA    Pharyngeal Residue thin liquids;nectar-thick liquids;honey-thick liquids;pudding/puree;valleculae;pyriform sinuses;other (see comments)   trace w/ thin, nectar; mild-mod w/ honey, puree  -SA    Response to Residue unable to clear residue  -SA    Attempted Compensatory Maneuvers multiple swallows  -SA    Response to Attempted Compensatory Maneuvers other (see comments)   reduced, but did not eliminate residue  -SA       SLP Communication to Radiology    Summary Statement Pt presents with moderate pharyngeal residue characterized by aspiration of thin and residue.  Penetration with nectar also noted inconsistently.   Reduced tongue base retraction, laryngeal elevation, and anterior hyoid movement all noted during study.  Premature spillage noted to pyriforms with nectar and thin; to valleculae with honey and puree.  Feel pt most at risk for aspiration of pharyngeal residue.  Though residue not severe, it was noted to progress from valleculae and pyriforms into vestibule extending to vocal cords.  Pt was unable to clear penetration and eventual aspiration noted.    -SA       Clinical Impression    SLP Swallowing Diagnosis moderate  -SA    Functional Impact risk of aspiration/pneumonia  -SA    Rehab Potential/Prognosis, Swallowing adequate, monitor progress closely  -SA    Swallow Criteria for Skilled Therapeutic Interventions Met demonstrates skilled criteria  -SA       Recommendations    SLP Diet Recommendation NPO;long term alternate methods of nutrition/hydration  -SA    SLP Rec. for Method of Medication Administration meds via alternate route  -SA      User Key  (r) = Recorded By, (t) = Taken By, (c) = Cosigned By    Initials Name Provider Type    Noreen Pink MS CCC-SLP Speech and Language Pathologist                              OP SLP Education     Row  Name 08/20/18 1412       Education    Barriers to Learning No barriers identified  -    Education Provided Described results of evaluation;Patient expressed understanding of evaluation;Family/caregivers expressed understanding of evaluation  -    Assessed Learning needs;Learning motivation  -    Learning Motivation Strong  -    Learning Method Explanation  -    Teaching Response Verbalized understanding  -    Education Comments Video reviewed with pt, wife, caregiver  -      User Key  (r) = Recorded By, (t) = Taken By, (c) = Cosigned By    Initials Name Effective Dates    Noreen Pink MS CCC-SLP 03/07/18 -                     OP SLP Assessment/Plan - 08/20/18 1411        SLP Assessment    Impact on Function: Swallowing Risk of aspiration  -    Clinical Impression: Swallowing Moderate:  -    SLP Diagnosis moderate pharyngeal dysphagia  -    Prognosis Good (comment)  -    Patient/caregiver participated in establishment of treatment plan and goals Yes  -SA    Patient would benefit from skilled therapy intervention Yes  -SA       SLP Plan    Frequency TBD  -SA    Duration TBD  -SA    Planned CPT's? SLP MBS: [id number]  -    Expected Duration Therapy Session - minutes 60-75 minutes  -      User Key  (r) = Recorded By, (t) = Taken By, (c) = Cosigned By    Initials Name Provider Type    Noreen Pink MS CCC-SLP Speech and Language Pathologist              SLP Outcome Measures (last 72 hours)      SLP Outcome Measures     Row Name 08/20/18 1015             SLP Outcome Measures    Outcome Measure Used? Adult NOMS  -SA         Adult FCM Scores    Swallowing FCM Score 2  -        User Key  (r) = Recorded By, (t) = Taken By, (c) = Cosigned By    Initials Name Effective Dates    Noreen Pink MS CCC-SLP 03/07/18 -                Time Calculation:   SLP Start Time: 1000  SLP Stop Time: 1130  SLP Time Calculation (min): 90 min    Therapy Charges for Today     Code Description Service Date  Service Provider Modifiers Qty    53825899156 HC ST SWALLOWING PROJECTED 8/20/2018 Noreen Mendez MS CCC-SLP GN, CM 1    [id number] HC ST SWALLOWING DISCHARGE 8/20/2018 Noreen Mendez MS CCC-SLP GN, CM 1    [id number]  ST MOTION FLUORO EVAL SWALLOW 6 8/20/2018 Noreen Mendez MS CCC-SLP GN 1          SLP G-Codes  SLP NOMS Used?: Yes  Functional Limitations: Swallowing  Swallow Goal Status (): At least 80 percent but less than 100 percent impaired, limited or restricted  Swallow Discharge Status (): At least 80 percent but less than 100 percent impaired, limited or restricted        Noreen Mendez MS CCC-SLP  8/20/2018

## 2018-08-28 ENCOUNTER — APPOINTMENT (OUTPATIENT)
Dept: GENERAL RADIOLOGY | Facility: HOSPITAL | Age: 83
End: 2018-08-28

## 2018-08-28 ENCOUNTER — HOSPITAL ENCOUNTER (EMERGENCY)
Facility: HOSPITAL | Age: 83
Discharge: HOME | End: 2018-08-28
Attending: EMERGENCY MEDICINE | Admitting: NURSE PRACTITIONER

## 2018-08-28 VITALS
TEMPERATURE: 98.7 F | OXYGEN SATURATION: 92 % | WEIGHT: 160 LBS | DIASTOLIC BLOOD PRESSURE: 80 MMHG | HEART RATE: 87 BPM | HEIGHT: 69 IN | SYSTOLIC BLOOD PRESSURE: 131 MMHG | BODY MASS INDEX: 23.7 KG/M2 | RESPIRATION RATE: 16 BRPM

## 2018-08-28 DIAGNOSIS — S29.019A THORACIC MYOFASCIAL STRAIN, INITIAL ENCOUNTER: Primary | ICD-10-CM

## 2018-08-28 DIAGNOSIS — S30.0XXA LUMBAR CONTUSION, INITIAL ENCOUNTER: ICD-10-CM

## 2018-08-28 DIAGNOSIS — S30.0XXA CONTUSION OF SACRUM, INITIAL ENCOUNTER: ICD-10-CM
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
## 2018-08-28 PROCEDURE — 72072 X-RAY EXAM THORAC SPINE 3VWS: CPT

## 2018-08-28 PROCEDURE — 72220 X-RAY EXAM SACRUM TAILBONE: CPT

## 2018-08-28 PROCEDURE — 96372 THER/PROPH/DIAG INJ SC/IM: CPT

## 2018-08-28 PROCEDURE — 72110 X-RAY EXAM L-2 SPINE 4/>VWS: CPT

## 2018-08-28 PROCEDURE — 99284 EMERGENCY DEPT VISIT MOD MDM: CPT

## 2018-08-28 PROCEDURE — 25010000002 HYDROMORPHONE PER 4 MG: Performed by: NURSE PRACTITIONER

## 2018-08-28 RX ADMIN — HYDROMORPHONE HYDROCHLORIDE 0.5 MG: 1 INJECTION, SOLUTION INTRAMUSCULAR; INTRAVENOUS; SUBCUTANEOUS at 19:25

## 2018-09-14 ENCOUNTER — APPOINTMENT (OUTPATIENT)
Dept: CT IMAGING | Facility: HOSPITAL | Age: 83
End: 2018-09-14

## 2018-09-14 ENCOUNTER — APPOINTMENT (OUTPATIENT)
Dept: GENERAL RADIOLOGY | Facility: HOSPITAL | Age: 83
End: 2018-09-14

## 2018-09-14 ENCOUNTER — HOSPITAL ENCOUNTER (INPATIENT)
Facility: HOSPITAL | Age: 83
LOS: 9 days | Discharge: NURSING, REHAB OR LONG-TERM CARE | End: 2018-09-24
Attending: EMERGENCY MEDICINE | Admitting: NEUROLOGICAL SURGERY

## 2018-09-14 DIAGNOSIS — N39.0 UTI DUE TO EXTENDED-SPECTRUM BETA LACTAMASE (ESBL) PRODUCING ESCHERICHIA COLI: ICD-10-CM

## 2018-09-14 DIAGNOSIS — Z74.09 IMPAIRED FUNCTIONAL MOBILITY, BALANCE, GAIT, AND ENDURANCE: ICD-10-CM

## 2018-09-14 DIAGNOSIS — S32.010A CLOSED COMPRESSION FRACTURE OF FIRST LUMBAR VERTEBRA, INITIAL ENCOUNTER: Primary | ICD-10-CM

## 2018-09-14 DIAGNOSIS — Z16.12 UTI DUE TO EXTENDED-SPECTRUM BETA LACTAMASE (ESBL) PRODUCING ESCHERICHIA COLI: ICD-10-CM

## 2018-09-14 DIAGNOSIS — B96.29 UTI DUE TO EXTENDED-SPECTRUM BETA LACTAMASE (ESBL) PRODUCING ESCHERICHIA COLI: ICD-10-CM

## 2018-09-14 LAB
ALBUMIN SERPL-MCNC: 3.4 G/DL (ref 3.5–5.2)
ALBUMIN/GLOB SERPL: 0.7 G/DL
ALP SERPL-CCNC: 149 U/L (ref 39–117)
ALT SERPL W P-5'-P-CCNC: 50 U/L (ref 1–41)
ANION GAP SERPL CALCULATED.3IONS-SCNC: 9.2 MMOL/L
ANION GAP SERPL CALCULATED.3IONS-SCNC: 9.9 MMOL/L
AST SERPL-CCNC: 53 U/L (ref 1–40)
BACTERIA UR QL AUTO: NORMAL /HPF
BASOPHILS # BLD AUTO: 0.01 10*3/MM3 (ref 0–0.2)
BASOPHILS NFR BLD AUTO: 0.1 % (ref 0–1.5)
BILIRUB SERPL-MCNC: 0.3 MG/DL (ref 0.1–1.2)
BILIRUB UR QL STRIP: NEGATIVE
BUN BLD-MCNC: 39 MG/DL (ref 8–23)
BUN BLD-MCNC: 43 MG/DL (ref 8–23)
BUN/CREAT SERPL: 44.8 (ref 7–25)
BUN/CREAT SERPL: 46.4 (ref 7–25)
CALCIUM SPEC-SCNC: 9.2 MG/DL (ref 8.6–10.5)
CALCIUM SPEC-SCNC: 9.9 MG/DL (ref 8.6–10.5)
CHLORIDE SERPL-SCNC: 93 MMOL/L (ref 98–107)
CHLORIDE SERPL-SCNC: 97 MMOL/L (ref 98–107)
CLARITY UR: CLEAR
CO2 SERPL-SCNC: 28.1 MMOL/L (ref 22–29)
CO2 SERPL-SCNC: 28.8 MMOL/L (ref 22–29)
COLOR UR: YELLOW
CREAT BLD-MCNC: 0.84 MG/DL (ref 0.76–1.27)
CREAT BLD-MCNC: 0.96 MG/DL (ref 0.76–1.27)
D-LACTATE SERPL-SCNC: 0.9 MMOL/L (ref 0.5–2)
DEPRECATED RDW RBC AUTO: 49.3 FL (ref 37–54)
EOSINOPHIL # BLD AUTO: 0 10*3/MM3 (ref 0–0.7)
EOSINOPHIL NFR BLD AUTO: 0 % (ref 0.3–6.2)
ERYTHROCYTE [DISTWIDTH] IN BLOOD BY AUTOMATED COUNT: 13.1 % (ref 11.5–14.5)
GFR SERPL CREATININE-BSD FRML MDRD: 74 ML/MIN/1.73
GFR SERPL CREATININE-BSD FRML MDRD: 86 ML/MIN/1.73
GLOBULIN UR ELPH-MCNC: 4.6 GM/DL
GLUCOSE BLD-MCNC: 89 MG/DL (ref 65–99)
GLUCOSE BLD-MCNC: 94 MG/DL (ref 65–99)
GLUCOSE UR STRIP-MCNC: NEGATIVE MG/DL
HCT VFR BLD AUTO: 39.6 % (ref 40.4–52.2)
HGB BLD-MCNC: 12.5 G/DL (ref 13.7–17.6)
HGB UR QL STRIP.AUTO: NEGATIVE
HYALINE CASTS UR QL AUTO: NORMAL /LPF
IMM GRANULOCYTES # BLD: 0.02 10*3/MM3 (ref 0–0.03)
IMM GRANULOCYTES NFR BLD: 0.2 % (ref 0–0.5)
INR PPP: 1.22 (ref 0.9–1.1)
KETONES UR QL STRIP: NEGATIVE
LEUKOCYTE ESTERASE UR QL STRIP.AUTO: ABNORMAL
LYMPHOCYTES # BLD AUTO: 1.59 10*3/MM3 (ref 0.9–4.8)
LYMPHOCYTES NFR BLD AUTO: 19.8 % (ref 19.6–45.3)
MCH RBC QN AUTO: 32.9 PG (ref 27–32.7)
MCHC RBC AUTO-ENTMCNC: 31.6 G/DL (ref 32.6–36.4)
MCV RBC AUTO: 104.2 FL (ref 79.8–96.2)
MONOCYTES # BLD AUTO: 0.72 10*3/MM3 (ref 0.2–1.2)
MONOCYTES NFR BLD AUTO: 8.9 % (ref 5–12)
NEUTROPHILS # BLD AUTO: 5.71 10*3/MM3 (ref 1.9–8.1)
NEUTROPHILS NFR BLD AUTO: 71 % (ref 42.7–76)
NITRITE UR QL STRIP: NEGATIVE
PH UR STRIP.AUTO: <=5 [PH] (ref 5–8)
PLATELET # BLD AUTO: 382 10*3/MM3 (ref 140–500)
PMV BLD AUTO: 10.5 FL (ref 6–12)
POTASSIUM BLD-SCNC: 5.5 MMOL/L (ref 3.5–5.2)
POTASSIUM BLD-SCNC: 5.9 MMOL/L (ref 3.5–5.2)
PROCALCITONIN SERPL-MCNC: 0.15 NG/ML (ref 0.1–0.25)
PROT SERPL-MCNC: 8 G/DL (ref 6–8.5)
PROT UR QL STRIP: ABNORMAL
PROTHROMBIN TIME: 15.2 SECONDS (ref 11.7–14.2)
RBC # BLD AUTO: 3.8 10*6/MM3 (ref 4.6–6)
RBC # UR: NORMAL /HPF
REF LAB TEST METHOD: NORMAL
SODIUM BLD-SCNC: 131 MMOL/L (ref 136–145)
SODIUM BLD-SCNC: 135 MMOL/L (ref 136–145)
SP GR UR STRIP: 1.02 (ref 1–1.03)
SQUAMOUS #/AREA URNS HPF: NORMAL /HPF
TROPONIN T SERPL-MCNC: <0.01 NG/ML (ref 0–0.03)
UROBILINOGEN UR QL STRIP: ABNORMAL
WBC NRBC COR # BLD: 8.05 10*3/MM3 (ref 4.5–10.7)
WBC UR QL AUTO: NORMAL /HPF

## 2018-09-14 PROCEDURE — 93010 ELECTROCARDIOGRAM REPORT: CPT | Performed by: INTERNAL MEDICINE

## 2018-09-14 PROCEDURE — G0378 HOSPITAL OBSERVATION PER HR: HCPCS

## 2018-09-14 PROCEDURE — 71046 X-RAY EXAM CHEST 2 VIEWS: CPT

## 2018-09-14 PROCEDURE — 72131 CT LUMBAR SPINE W/O DYE: CPT

## 2018-09-14 PROCEDURE — 81001 URINALYSIS AUTO W/SCOPE: CPT | Performed by: EMERGENCY MEDICINE

## 2018-09-14 PROCEDURE — 84145 PROCALCITONIN (PCT): CPT | Performed by: EMERGENCY MEDICINE

## 2018-09-14 PROCEDURE — 94799 UNLISTED PULMONARY SVC/PX: CPT

## 2018-09-14 PROCEDURE — 87040 BLOOD CULTURE FOR BACTERIA: CPT | Performed by: EMERGENCY MEDICINE

## 2018-09-14 PROCEDURE — 99285 EMERGENCY DEPT VISIT HI MDM: CPT

## 2018-09-14 PROCEDURE — 83605 ASSAY OF LACTIC ACID: CPT | Performed by: EMERGENCY MEDICINE

## 2018-09-14 PROCEDURE — 87086 URINE CULTURE/COLONY COUNT: CPT | Performed by: EMERGENCY MEDICINE

## 2018-09-14 PROCEDURE — 87186 SC STD MICRODIL/AGAR DIL: CPT | Performed by: EMERGENCY MEDICINE

## 2018-09-14 PROCEDURE — 80053 COMPREHEN METABOLIC PANEL: CPT | Performed by: EMERGENCY MEDICINE

## 2018-09-14 PROCEDURE — 85025 COMPLETE CBC W/AUTO DIFF WBC: CPT | Performed by: EMERGENCY MEDICINE

## 2018-09-14 PROCEDURE — 85610 PROTHROMBIN TIME: CPT | Performed by: EMERGENCY MEDICINE

## 2018-09-14 PROCEDURE — 93005 ELECTROCARDIOGRAM TRACING: CPT | Performed by: EMERGENCY MEDICINE

## 2018-09-14 PROCEDURE — 87088 URINE BACTERIA CULTURE: CPT | Performed by: EMERGENCY MEDICINE

## 2018-09-14 PROCEDURE — 84484 ASSAY OF TROPONIN QUANT: CPT | Performed by: EMERGENCY MEDICINE

## 2018-09-14 PROCEDURE — 87147 CULTURE TYPE IMMUNOLOGIC: CPT | Performed by: EMERGENCY MEDICINE

## 2018-09-14 PROCEDURE — 36415 COLL VENOUS BLD VENIPUNCTURE: CPT

## 2018-09-14 RX ORDER — ONDANSETRON 4 MG/1
4 TABLET, ORALLY DISINTEGRATING ORAL EVERY 6 HOURS PRN
Status: DISCONTINUED | OUTPATIENT
Start: 2018-09-14 | End: 2018-09-17 | Stop reason: SDUPTHER

## 2018-09-14 RX ORDER — SODIUM CHLORIDE 0.9 % (FLUSH) 0.9 %
10 SYRINGE (ML) INJECTION AS NEEDED
Status: DISCONTINUED | OUTPATIENT
Start: 2018-09-14 | End: 2018-09-24 | Stop reason: HOSPADM

## 2018-09-14 RX ORDER — ONDANSETRON 4 MG/1
4 TABLET, FILM COATED ORAL EVERY 6 HOURS PRN
Status: DISCONTINUED | OUTPATIENT
Start: 2018-09-14 | End: 2018-09-17 | Stop reason: SDUPTHER

## 2018-09-14 RX ORDER — SODIUM CHLORIDE 0.9 % (FLUSH) 0.9 %
1-10 SYRINGE (ML) INJECTION AS NEEDED
Status: DISCONTINUED | OUTPATIENT
Start: 2018-09-14 | End: 2018-09-24 | Stop reason: HOSPADM

## 2018-09-14 RX ORDER — ONDANSETRON 2 MG/ML
4 INJECTION INTRAMUSCULAR; INTRAVENOUS EVERY 6 HOURS PRN
Status: DISCONTINUED | OUTPATIENT
Start: 2018-09-14 | End: 2018-09-17 | Stop reason: SDUPTHER

## 2018-09-14 RX ORDER — BISACODYL 10 MG
10 SUPPOSITORY, RECTAL RECTAL DAILY PRN
Status: DISCONTINUED | OUTPATIENT
Start: 2018-09-14 | End: 2018-09-24 | Stop reason: HOSPADM

## 2018-09-14 RX ORDER — MIDODRINE HYDROCHLORIDE 10 MG/1
10 TABLET ORAL 3 TIMES DAILY
COMMUNITY

## 2018-09-14 RX ORDER — SODIUM CHLORIDE 9 MG/ML
100 INJECTION, SOLUTION INTRAVENOUS CONTINUOUS
Status: DISCONTINUED | OUTPATIENT
Start: 2018-09-14 | End: 2018-09-15

## 2018-09-14 RX ORDER — ACETAMINOPHEN 325 MG/1
650 TABLET ORAL EVERY 4 HOURS PRN
Status: DISCONTINUED | OUTPATIENT
Start: 2018-09-14 | End: 2018-09-24 | Stop reason: HOSPADM

## 2018-09-14 RX ORDER — LACOSAMIDE 100 MG/1
100 TABLET ORAL ONCE
Status: COMPLETED | OUTPATIENT
Start: 2018-09-14 | End: 2018-09-14

## 2018-09-14 RX ORDER — LINEZOLID 600 MG/1
600 TABLET, FILM COATED ORAL 2 TIMES DAILY
COMMUNITY
Start: 2018-09-10 | End: 2018-09-24 | Stop reason: HOSPADM

## 2018-09-14 RX ORDER — BISACODYL 5 MG/1
5 TABLET, DELAYED RELEASE ORAL DAILY PRN
Status: DISCONTINUED | OUTPATIENT
Start: 2018-09-14 | End: 2018-09-24 | Stop reason: HOSPADM

## 2018-09-14 RX ORDER — LEVETIRACETAM 500 MG/1
1500 TABLET ORAL ONCE
Status: COMPLETED | OUTPATIENT
Start: 2018-09-14 | End: 2018-09-14

## 2018-09-14 RX ADMIN — SODIUM CHLORIDE 1000 ML: 9 INJECTION, SOLUTION INTRAVENOUS at 20:03

## 2018-09-14 RX ADMIN — LACOSAMIDE 100 MG: 100 TABLET, FILM COATED ORAL at 22:38

## 2018-09-14 RX ADMIN — LEVETIRACETAM 1500 MG: 500 TABLET, FILM COATED ORAL at 22:38

## 2018-09-14 NOTE — ED PROVIDER NOTES
EMERGENCY DEPARTMENT ENCOUNTER    CHIEF COMPLAINT  Chief Complaint: generalized weakness  History given by: patient's family; patient  History limited by: the patient has a trach.  Room Number: PAUL/PAUL  PMD: Suman Jaimes III, MD      HPI:  Pt is a 88 y.o. male who presents to the ED with complaint of increasing generalized weakness for the past two weeks. Pt's family also reports generalized body aches, lower abdominal pain, and chronic shortness of breath. Pt's family denies nausea, vomiting, fever, diarrhea, or abdominal distention. Pt denies headache, focal numbness, or focal weakness. Pt's family reports that the patient was started on Linezolid 600 mg BID four days ago to treat ESBL UTI but has had no improvement. The patient's family reports that the patient is usually able to ambulate with a walker but has not been able to ambulate. Pt has a trach which was placed after he had a seizure. Pt has a peg tube with balloon in place. Pt has a hx of COPD. There are no other complaints at this time.    Duration: two weeks  Onset: gradual  Timing: constant  Location: generalized body  Radiation: none specified  Quality: weakness  Intensity/Severity: moderate  Progression: not specified  Associated Symptoms: generalized body aches, lower abdominal pain, and chronic shortness of breath  Aggravating Factors: none specified  Alleviating Factors: none specified  Previous Episodes: none specified  Treatment before arrival: Pt's family reports that the patient was started on Linezolid 600 mg BID four days ago to treat ESBL UTI but has had no improvement.    PAST MEDICAL HISTORY  Active Ambulatory Problems     Diagnosis Date Noted   • Chest pain (non-cardiac) 12/29/2017   • Atrial fibrillation (CMS/[facility]) 12/29/2017   • C. difficile diarrhea 12/29/2017   • Coronary artery disease 12/29/2017   • Diabetes mellitus (CMS/[facility]) 12/29/2017   • Disease of thyroid gland 12/29/2017   • Hypertension 12/29/2017   • Pressure ulcer of  sacral region 12/29/2017   • Ankylosing spondylitis lumbar region (CMS/[facility]) 12/29/2017   • Neurogenic bladder due to ankylosing spondylitis 12/29/2017   • Tracheostomy in place (CMS/HCC) 12/29/2017   • PEG (percutaneous endoscopic gastrostomy) status (CMS/HCC) 12/29/2017   • Acute UTI 01/19/2018   • Sepsis (CMS/HCC) 01/19/2018   • Clostridium difficile colitis 01/22/2018   • Anticoagulated 01/31/2018     Resolved Ambulatory Problems     Diagnosis Date Noted   • No Resolved Ambulatory Problems     Past Medical History:   Diagnosis Date   • Ankylosing spondylitis lumbar region (CMS/[facility])    • BPH (benign prostatic hyperplasia)    • C. difficile diarrhea    • Constipation    • Coronary artery disease    • Diabetes mellitus (CMS/HCC)    • Dysphagia    • Hyperlipidemia    • Hypertension    • Hypothyroidism    • MRSA infection    • Neuromuscular dysfunction of bladder    • PAF (paroxysmal atrial fibrillation) (CMS/HCC)    • Pain    • Pressure ulcer of sacral region    • Seizures (CMS/HCC)    • Tracheostomy status (CMS/HCC)        PAST SURGICAL HISTORY  Past Surgical History:   Procedure Laterality Date   • CARDIAC SURGERY     • CORONARY ARTERY BYPASS GRAFT      x 5   • GTUBE INSERTION     • TRACHEOSTOMY         FAMILY HISTORY  History reviewed. No pertinent family history.    SOCIAL HISTORY  Social History     Social History   • Marital status:      Spouse name: N/A   • Number of children: N/A   • Years of education: N/A     Occupational History   • Not on file.     Social History Main Topics   • Smoking status: Former Smoker   • Smokeless tobacco: Never Used   • Alcohol use No   • Drug use: No   • Sexual activity: Defer     Other Topics Concern   • Not on file     Social History Narrative   • No narrative on file       ALLERGIES  Patient has no known allergies.    REVIEW OF SYSTEMS  Review of Systems   Unable to perform ROS: Other (The patient has a trach.)   Constitutional: Negative for activity change, appetite  change and fever.   HENT: Negative for congestion and sore throat.    Respiratory: Positive for shortness of breath (chronic). Negative for cough.    Cardiovascular: Negative for chest pain and leg swelling.   Gastrointestinal: Positive for abdominal pain (lower). Negative for abdominal distention, diarrhea, nausea and vomiting.   Genitourinary: Negative for decreased urine volume and dysuria.   Musculoskeletal: Positive for myalgias (generalized). Negative for neck pain.   Skin: Negative for rash and wound.   Neurological: Positive for weakness (generalized). Negative for numbness and headaches.        Negative for focal weakness       PHYSICAL EXAM  ED Triage Vitals [09/14/18 1824]   Temp Heart Rate Resp BP SpO2   98.7 °F (37.1 °C) 88 16 141/73 97 %      Temp src Heart Rate Source Patient Position BP Location FiO2 (%)   Tympanic -- -- -- --       Physical Exam   Constitutional: He is oriented to person, place, and time. He appears distressed (mild).   HENT:   Head: Normocephalic and atraumatic.   Mouth/Throat: Mucous membranes are dry.   Eyes: Pupils are equal, round, and reactive to light. EOM are normal.   Neck: Normal range of motion. Neck supple.   Trach in place with minimal tan sputum.   Cardiovascular: Normal rate, regular rhythm and normal heart sounds.  Exam reveals no gallop and no friction rub.    No murmur heard.  Pulmonary/Chest: He is in respiratory distress (mild). He has decreased breath sounds in the right lower field and the left lower field. He has no wheezes. He has rhonchi in the left lower field. He has no rales. He exhibits no tenderness.   Abdominal: Soft. Bowel sounds are normal. He exhibits no distension and no mass. There is no tenderness. There is no rebound and no guarding.   Peg tube in LUQ of the abdomen.   Genitourinary:   Genitourinary Comments: Chandler catheter in place with yellow fluid.   Musculoskeletal: Normal range of motion. He exhibits no edema.   Neurological: He is alert  and oriented to person, place, and time. He has normal sensation and normal strength.   Skin: Skin is warm and dry.   Psychiatric: Mood and affect normal.   Nursing note and vitals reviewed.      LAB RESULTS  Lab Results (last 24 hours)     Procedure Component Value Units Date/Time    CBC & Differential [[id number]] Collected:  09/14/18 2002    Specimen:  Blood Updated:  09/14/18 2031    Narrative:       The following orders were created for panel order CBC & Differential.  Procedure                               Abnormality         Status                     ---------                               -----------         ------                     CBC Auto Differential[[id number]]        Abnormal            Final result                 Please view results for these tests on the individual orders.    Comprehensive Metabolic Panel [[id number]]  (Abnormal) Collected:  09/14/18 2002    Specimen:  Blood from Arm, Right Updated:  09/14/18 2051     Glucose 94 mg/dL      BUN 43 (H) mg/dL      Creatinine 0.96 mg/dL      Sodium 131 (L) mmol/L      Potassium 5.9 (H) mmol/L      Chloride 93 (L) mmol/L      CO2 28.1 mmol/L      Calcium 9.9 mg/dL      Total Protein 8.0 g/dL      Albumin 3.40 (L) g/dL      ALT (SGPT) 50 (H) U/L      AST (SGOT) 53 (H) U/L      Alkaline Phosphatase 149 (H) U/L      Total Bilirubin 0.3 mg/dL      eGFR Non African Amer 74 mL/min/1.73      Globulin 4.6 gm/dL      A/G Ratio 0.7 g/dL      BUN/Creatinine Ratio 44.8 (H)     Anion Gap 9.9 mmol/L     Narrative:       The MDRD GFR formula is only valid for adults with stable renal function between ages 18 and 70.    Protime-INR [[id number]]  (Abnormal) Collected:  09/14/18 2002    Specimen:  Blood from Arm, Right Updated:  09/14/18 2040     Protime 15.2 (H) Seconds      INR 1.22 (H)    Troponin [[id number]]  (Normal) Collected:  09/14/18 2002    Specimen:  Blood from Arm, Right Updated:  09/14/18 2056     Troponin T <0.010 ng/mL     Narrative:       Troponin T  "Reference Ranges:  Less than 0.03 ng/mL:    Negative for AMI  0.03 to 0.09 ng/mL:      Indeterminant for AMI  Greater than 0.09 ng/mL: Positive for AMI    Blood Culture - Blood, [[id number]] Collected:  09/14/18 2002    Specimen:  Blood from Arm, Right Updated:  09/14/18 2017    Lactic Acid, Plasma [[id number]]  (Normal) Collected:  09/14/18 2002    Specimen:  Blood from Arm, Right Updated:  09/14/18 2033     Lactate 0.9 mmol/L     Procalcitonin [[id number]]  (Normal) Collected:  09/14/18 2002    Specimen:  Blood from Arm, Right Updated:  09/14/18 2056     Procalcitonin 0.15 ng/mL     Narrative:       As a Marker for Sepsis (Non-Neonates):   1. <0.5 ng/mL represents a low risk of severe sepsis and/or septic shock.  1. >2 ng/mL represents a high risk of severe sepsis and/or septic shock.    As a Marker for Lower Respiratory Tract Infections that require antibiotic therapy:  PCT on Admission     Antibiotic Therapy             6-12 Hrs later  > 0.5                Strongly Recommended            >0.25 - <0.5         Recommended  0.1 - 0.25           Discouraged                   Remeasure/reassess PCT  <0.1                 Strongly Discouraged          Remeasure/reassess PCT      As 28 day mortality risk marker: \"Change in Procalcitonin Result\" (> 80 % or <=80 %) if Day 0 (or Day 1) and Day 4 values are available. Refer to http://www.[employer]-pct-calculator.com/   Change in PCT <=80 %   A decrease of PCT levels below or equal to 80 % defines a positive change in PCT test result representing a higher risk for 28-day all-cause mortality of patients diagnosed with severe sepsis or septic shock.  Change in PCT > 80 %   A decrease of PCT levels of more than 80 % defines a negative change in PCT result representing a lower risk for 28-day all-cause mortality of patients diagnosed with severe sepsis or septic shock.                CBC Auto Differential [[id number]]  (Abnormal) Collected:  09/14/18 2002    Specimen:  Blood from Arm, " Right Updated:  09/14/18 2031     WBC 8.05 10*3/mm3      RBC 3.80 (L) 10*6/mm3      Hemoglobin 12.5 (L) g/dL      Hematocrit 39.6 (L) %      .2 (H) fL      MCH 32.9 (H) pg      MCHC 31.6 (L) g/dL      RDW 13.1 %      RDW-SD 49.3 fl      MPV 10.5 fL      Platelets 382 10*3/mm3      Neutrophil % 71.0 %      Lymphocyte % 19.8 %      Monocyte % 8.9 %      Eosinophil % 0.0 (L) %      Basophil % 0.1 %      Immature Grans % 0.2 %      Neutrophils, Absolute 5.71 10*3/mm3      Lymphocytes, Absolute 1.59 10*3/mm3      Monocytes, Absolute 0.72 10*3/mm3      Eosinophils, Absolute 0.00 10*3/mm3      Basophils, Absolute 0.01 10*3/mm3      Immature Grans, Absolute 0.02 10*3/mm3     Blood Culture - Blood, [[id number]] Collected:  09/14/18 2009    Specimen:  Blood from Arm, Left Updated:  09/14/18 2017    Urinalysis With Culture If Indicated - Urine, Catheter [[id number]]  (Abnormal) Collected:  09/14/18 2013    Specimen:  Urine from Urine, Catheter Updated:  09/14/18 2038     Color, UA Yellow     Appearance, UA Clear     pH, UA <=5.0     Specific Gravity, UA 1.017     Glucose, UA Negative     Ketones, UA Negative     Bilirubin, UA Negative     Blood, UA Negative     Protein,  mg/dL (2+) (A)     Leuk Esterase, UA Small (1+) (A)     Nitrite, UA Negative     Urobilinogen, UA 1.0 E.U./dL    Urinalysis, Microscopic Only - Urine, Clean Catch [[id number]] Collected:  09/14/18 2013    Specimen:  Urine from Urine, Catheter Updated:  09/14/18 2038     RBC, UA 0-2 /HPF      WBC, UA 0-2 /HPF      Bacteria, UA None Seen /HPF      Squamous Epithelial Cells, UA 0-2 /HPF      Hyaline Casts, UA None Seen /LPF      Methodology Automated Microscopy    Urine Culture - Urine, [[id number]] Collected:  09/14/18 2013    Specimen:  Urine from Urine, Catheter Updated:  09/14/18 2033    Basic Metabolic Panel [[id number]]  (Abnormal) Collected:  09/14/18 2119    Specimen:  Blood Updated:  09/14/18 2148     Glucose 89 mg/dL      BUN 39 (H) mg/dL       Creatinine 0.84 mg/dL      Sodium 135 (L) mmol/L      Potassium 5.5 (H) mmol/L      Chloride 97 (L) mmol/L      CO2 28.8 mmol/L      Calcium 9.2 mg/dL      eGFR Non African Amer 86 mL/min/1.73      BUN/Creatinine Ratio 46.4 (H)     Anion Gap 9.2 mmol/L     Narrative:       The MDRD GFR formula is only valid for adults with stable renal function between ages 18 and 70.          I ordered the above labs and reviewed the results    RADIOLOGY  CT Lumbar Spine Without Contrast   Preliminary Result   1.  Acute compression fracture of L1, 20% loss of vertebral body height.   No retropulsed fracture fragment.   2.  Mild spondylosis of the lumbar spine.   3.  Incidental note of gallstones without evidence for cholecystitis.                  XR Chest 2 View   Final Result   1. No focal pulmonary consolidation. Borderline heart size. Tortuous   aorta. Follow-up as clinically indicated.   2. L1 compression deformity, change from 8/28/2018.       This report was finalized on 9/14/2018 8:50 PM by Dr. Rakesh Barba M.D.               I ordered the above noted radiological studies. Interpreted by radiologist. Reviewed by me in PACS.       PROCEDURES  Feeding Tube Replacement  Date/Time: 9/14/2018 11:09 PM  Performed by: MEAGHAN MINER  Authorized by: MEAGHAN MINER     Consent:     Consent obtained:  Verbal    Consent given by:  Patient  Universal protocol:     Procedure explained and questions answered to patient or proxy's satisfaction: yes      Relevant documents present and verified: yes      Test results available and properly labeled: yes      Imaging studies available: yes      Required blood products, implants, devices, and special equipment available: yes      Site/side marked: yes      Patient identity confirmed:  Arm band, hospital-assigned identification number and verbally with patient  Pre-procedure details:     Old tube type: Peg tube.    Old tube size: 20 Fr.  Anesthesia (see MAR for exact dosages):      Anesthesia method:  None  Procedure details:     Patient position:  Supine    Procedure type:  Replacement    Tube type:  Gastrostomy    Tube size: 20 Fr.  Post-procedure details:     Placement difficulty:  None    Bleeding:  None    Patient tolerance of procedure:  Tolerated well, no immediate complications        EKG           EKG time: 1936  Rhythm/Rate: SR, 90  P waves and PA: first degree AV block  QRS, axis: RBBB, LAFB   ST and T waves: nml     Interpreted Contemporaneously by me, independently viewed and is unchanged compared to prior on 01/31/18.    PROGRESS AND CONSULTS  1921 EKG was ordered d/t protocol.    1943 Ordered UA, CXR, and blood work for further evaluation. Ordered IV Fluids for hydration.    2113 Rechecked the patient who is resting comfortably and in NAD. Discussed the CXR findings that show a L1 compression fracture. On exam, there is a stage one sacral decubitus ulcer. Discussed the plan for a L Spine CT for further evaluation. Pt understands and agrees with the plan, all questions answered.    2119 Ordered CT L Spine and blood work for further evaluation. Placed call to [facility] for admission.    2143 Received a call from Dr. Yates, [facility], and discussed pt's case. Dr. Yates agreed with plan to admit the patient to med/surg obs for further management.    2300 Per RN, the patient's peg tube ballon deflated. His 20 Fr peg tube with balloon was replaced with a 20 Fr g-tube.    MEDICAL DECISION MAKING  Results were reviewed/discussed with the patient and they were also made aware of online access. Pt also made aware that some labs, such as cultures, will not be resulted during ER visit and follow up with PMD is necessary.     MDM  Number of Diagnoses or Management Options  Closed compression fracture of first lumbar vertebra, initial encounter (CMS/[facility]):   UTI due to extended-spectrum beta lactamase (ESBL) producing Escherichia coli:      Amount and/or Complexity of Data Reviewed  Clinical lab  tests: reviewed and ordered (Sodium: 131 and Potassium: 5.5)  Tests in the radiology section of CPT®: ordered and reviewed (CXR shows no focal pulmonary consolidation. Borderline heart size. Tortuous aorta. L1 compression deformity, change from 08/28/18. CT L Spine shows acute compression fracture of L1, 20% loss of vertebral body height. No retropulsed fracture fragment. Mild spondylosis of the lumbar spine. Incidental note of gallstones without evidence for cholecystitis.)  Tests in the medicine section of CPT®: ordered and reviewed (See procedure notes for EKG interpretation.)  Decide to obtain previous medical records or to obtain history from someone other than the patient: yes  Obtain history from someone other than the patient: yes (Patient's family)  Review and summarize past medical records: yes (The patient was seen in the ED on 08/28/18 for mid and low back pain. The L Spine and T Spine XRs showed degenerative changes, but no acute fracture. The patient was discharged and advised to f/u with his PCP.)  Discuss the patient with other providers: yes (Dr. Yates, [facility])  Independent visualization of images, tracings, or specimens: yes    Patient Progress  Patient progress: stable         DIAGNOSIS  Final diagnoses:   Closed compression fracture of first lumbar vertebra, initial encounter (CMS/[facility])   UTI due to extended-spectrum beta lactamase (ESBL) producing Escherichia coli       DISPOSITION  ADMISSION TO MED/SURG OBS    Discussed treatment plan and reason for admission with pt/family and admitting physician.  Pt/family voiced understanding of the plan for admission for further testing/treatment as needed.     Latest Documented Vital Signs:  As of 11:53 PM  BP- 125/72 HR- 108 Temp- 98.7 °F (37.1 °C) (Tympanic) O2 sat- 92%    --  Documentation assistance provided by dirk Castro for Dr. Roxana MD.  Information recorded by the dirk was done at my direction and has been verified and validated by  me.            Lanette Castro  09/14/18 [id number]       Oswaldo Schmidt MD  09/15/18 0457

## 2018-09-15 ENCOUNTER — ANESTHESIA (OUTPATIENT)
Dept: PERIOP | Facility: HOSPITAL | Age: 83
End: 2018-09-15

## 2018-09-15 ENCOUNTER — APPOINTMENT (OUTPATIENT)
Dept: GENERAL RADIOLOGY | Facility: HOSPITAL | Age: 83
End: 2018-09-15

## 2018-09-15 ENCOUNTER — ANESTHESIA EVENT (OUTPATIENT)
Dept: PERIOP | Facility: HOSPITAL | Age: 83
End: 2018-09-15

## 2018-09-15 PROBLEM — R56.9 SEIZURES (HCC): Status: ACTIVE | Noted: 2018-09-15

## 2018-09-15 PROBLEM — I25.10 CAD (CORONARY ARTERY DISEASE): Status: ACTIVE | Noted: 2018-09-15

## 2018-09-15 PROBLEM — I44.0 AV BLOCK, 1ST DEGREE: Status: ACTIVE | Noted: 2018-09-15

## 2018-09-15 PROBLEM — D53.9 MACROCYTIC ANEMIA: Status: ACTIVE | Noted: 2018-09-15

## 2018-09-15 PROBLEM — J44.9 COPD (CHRONIC OBSTRUCTIVE PULMONARY DISEASE) (HCC): Status: ACTIVE | Noted: 2018-09-15

## 2018-09-15 PROBLEM — E87.5 HYPERKALEMIA: Status: ACTIVE | Noted: 2018-09-15

## 2018-09-15 PROBLEM — I48.0 PAF (PAROXYSMAL ATRIAL FIBRILLATION) (HCC): Status: ACTIVE | Noted: 2018-09-15

## 2018-09-15 PROBLEM — E03.9 HYPOTHYROIDISM: Status: ACTIVE | Noted: 2018-09-15

## 2018-09-15 PROBLEM — I45.2 BIFASCICULAR BLOCK: Status: ACTIVE | Noted: 2018-09-15

## 2018-09-15 PROBLEM — Z93.0 TRACHEOSTOMY STATUS (HCC): Status: ACTIVE | Noted: 2018-09-15

## 2018-09-15 PROBLEM — Z86.19 HISTORY OF CLOSTRIDIUM DIFFICILE COLITIS: Status: ACTIVE | Noted: 2018-09-15

## 2018-09-15 PROBLEM — E11.9 DIABETES TYPE 2, CONTROLLED (HCC): Status: ACTIVE | Noted: 2018-09-15

## 2018-09-15 PROBLEM — Z79.01 ANTICOAGULATED: Status: ACTIVE | Noted: 2018-09-15

## 2018-09-15 PROBLEM — N31.9 NEUROMUSCULAR DYSFUNCTION OF BLADDER: Status: ACTIVE | Noted: 2018-09-15

## 2018-09-15 PROBLEM — Z93.1 GASTROSTOMY TUBE DEPENDENT (HCC): Status: ACTIVE | Noted: 2018-09-15

## 2018-09-15 LAB
ANION GAP SERPL CALCULATED.3IONS-SCNC: 10.1 MMOL/L
BUN BLD-MCNC: 38 MG/DL (ref 8–23)
BUN/CREAT SERPL: 44.2 (ref 7–25)
CALCIUM SPEC-SCNC: 9.2 MG/DL (ref 8.6–10.5)
CHLORIDE SERPL-SCNC: 97 MMOL/L (ref 98–107)
CO2 SERPL-SCNC: 25.9 MMOL/L (ref 22–29)
CREAT BLD-MCNC: 0.86 MG/DL (ref 0.76–1.27)
DEPRECATED RDW RBC AUTO: 51.6 FL (ref 37–54)
ERYTHROCYTE [DISTWIDTH] IN BLOOD BY AUTOMATED COUNT: 13.3 % (ref 11.5–14.5)
GFR SERPL CREATININE-BSD FRML MDRD: 84 ML/MIN/1.73
GLUCOSE BLD-MCNC: 97 MG/DL (ref 65–99)
GLUCOSE BLDC GLUCOMTR-MCNC: 118 MG/DL (ref 70–130)
GLUCOSE BLDC GLUCOMTR-MCNC: 131 MG/DL (ref 70–130)
GLUCOSE BLDC GLUCOMTR-MCNC: 86 MG/DL (ref 70–130)
GLUCOSE BLDC GLUCOMTR-MCNC: 96 MG/DL (ref 70–130)
HBA1C MFR BLD: 6.5 % (ref 4.8–5.6)
HCT VFR BLD AUTO: 39.6 % (ref 40.4–52.2)
HGB BLD-MCNC: 12.2 G/DL (ref 13.7–17.6)
INR PPP: 1.18 (ref 0.9–1.1)
MAGNESIUM SERPL-MCNC: 2.4 MG/DL (ref 1.6–2.4)
MCH RBC QN AUTO: 32.9 PG (ref 27–32.7)
MCHC RBC AUTO-ENTMCNC: 30.8 G/DL (ref 32.6–36.4)
MCV RBC AUTO: 106.7 FL (ref 79.8–96.2)
PHOSPHATE SERPL-MCNC: 3.4 MG/DL (ref 2.5–4.5)
PLATELET # BLD AUTO: 333 10*3/MM3 (ref 140–500)
PMV BLD AUTO: 10.5 FL (ref 6–12)
POTASSIUM BLD-SCNC: 5.5 MMOL/L (ref 3.5–5.2)
PROTHROMBIN TIME: 14.8 SECONDS (ref 11.7–14.2)
RBC # BLD AUTO: 3.71 10*6/MM3 (ref 4.6–6)
SODIUM BLD-SCNC: 133 MMOL/L (ref 136–145)
WBC NRBC COR # BLD: 8.63 10*3/MM3 (ref 4.5–10.7)

## 2018-09-15 PROCEDURE — 94640 AIRWAY INHALATION TREATMENT: CPT

## 2018-09-15 PROCEDURE — 84100 ASSAY OF PHOSPHORUS: CPT | Performed by: HOSPITALIST

## 2018-09-15 PROCEDURE — 93010 ELECTROCARDIOGRAM REPORT: CPT | Performed by: INTERNAL MEDICINE

## 2018-09-15 PROCEDURE — 85610 PROTHROMBIN TIME: CPT | Performed by: NEUROLOGICAL SURGERY

## 2018-09-15 PROCEDURE — G0378 HOSPITAL OBSERVATION PER HR: HCPCS

## 2018-09-15 PROCEDURE — 93005 ELECTROCARDIOGRAM TRACING: CPT | Performed by: HOSPITALIST

## 2018-09-15 PROCEDURE — 36415 COLL VENOUS BLD VENIPUNCTURE: CPT | Performed by: HOSPITALIST

## 2018-09-15 PROCEDURE — 83735 ASSAY OF MAGNESIUM: CPT | Performed by: HOSPITALIST

## 2018-09-15 PROCEDURE — 83036 HEMOGLOBIN GLYCOSYLATED A1C: CPT | Performed by: HOSPITALIST

## 2018-09-15 PROCEDURE — 99232 SBSQ HOSP IP/OBS MODERATE 35: CPT | Performed by: NEUROLOGICAL SURGERY

## 2018-09-15 PROCEDURE — 80048 BASIC METABOLIC PNL TOTAL CA: CPT | Performed by: HOSPITALIST

## 2018-09-15 PROCEDURE — 94799 UNLISTED PULMONARY SVC/PX: CPT

## 2018-09-15 PROCEDURE — 99204 OFFICE O/P NEW MOD 45 MIN: CPT | Performed by: INTERNAL MEDICINE

## 2018-09-15 PROCEDURE — 71045 X-RAY EXAM CHEST 1 VIEW: CPT

## 2018-09-15 PROCEDURE — 99214 OFFICE O/P EST MOD 30 MIN: CPT | Performed by: INTERNAL MEDICINE

## 2018-09-15 PROCEDURE — 82962 GLUCOSE BLOOD TEST: CPT

## 2018-09-15 PROCEDURE — 93005 ELECTROCARDIOGRAM TRACING: CPT | Performed by: NEUROLOGICAL SURGERY

## 2018-09-15 PROCEDURE — 85027 COMPLETE CBC AUTOMATED: CPT | Performed by: HOSPITALIST

## 2018-09-15 RX ORDER — SODIUM POLYSTYRENE SULFONATE 15 G/60ML
15 SUSPENSION ORAL; RECTAL ONCE
Status: COMPLETED | OUTPATIENT
Start: 2018-09-15 | End: 2018-09-15

## 2018-09-15 RX ORDER — HYDROCODONE BITARTRATE AND ACETAMINOPHEN 5; 325 MG/1; MG/1
1 TABLET ORAL EVERY 6 HOURS PRN
Status: DISCONTINUED | OUTPATIENT
Start: 2018-09-15 | End: 2018-09-24 | Stop reason: HOSPADM

## 2018-09-15 RX ORDER — ONDANSETRON 2 MG/ML
4 INJECTION INTRAMUSCULAR; INTRAVENOUS EVERY 6 HOURS PRN
Status: DISCONTINUED | OUTPATIENT
Start: 2018-09-15 | End: 2018-09-24 | Stop reason: HOSPADM

## 2018-09-15 RX ORDER — SODIUM CHLORIDE 0.9 % (FLUSH) 0.9 %
1-10 SYRINGE (ML) INJECTION AS NEEDED
Status: DISCONTINUED | OUTPATIENT
Start: 2018-09-15 | End: 2018-09-24 | Stop reason: HOSPADM

## 2018-09-15 RX ORDER — LEVETIRACETAM 100 MG/ML
1500 SOLUTION ORAL EVERY 12 HOURS SCHEDULED
Status: DISCONTINUED | OUTPATIENT
Start: 2018-09-15 | End: 2018-09-24 | Stop reason: HOSPADM

## 2018-09-15 RX ORDER — DEXTROSE MONOHYDRATE 25 G/50ML
25 INJECTION, SOLUTION INTRAVENOUS
Status: DISCONTINUED | OUTPATIENT
Start: 2018-09-15 | End: 2018-09-24 | Stop reason: HOSPADM

## 2018-09-15 RX ORDER — ONDANSETRON 4 MG/1
4 TABLET, ORALLY DISINTEGRATING ORAL EVERY 6 HOURS PRN
Status: DISCONTINUED | OUTPATIENT
Start: 2018-09-15 | End: 2018-09-24 | Stop reason: HOSPADM

## 2018-09-15 RX ORDER — ATORVASTATIN CALCIUM 20 MG/1
40 TABLET, FILM COATED ORAL DAILY
Status: DISCONTINUED | OUTPATIENT
Start: 2018-09-15 | End: 2018-09-24 | Stop reason: HOSPADM

## 2018-09-15 RX ORDER — ALBUTEROL SULFATE 2.5 MG/3ML
2.5 SOLUTION RESPIRATORY (INHALATION) 2 TIMES DAILY
Status: DISCONTINUED | OUTPATIENT
Start: 2018-09-15 | End: 2018-09-24 | Stop reason: HOSPADM

## 2018-09-15 RX ORDER — FINASTERIDE 5 MG/1
5 TABLET, FILM COATED ORAL DAILY
Status: DISCONTINUED | OUTPATIENT
Start: 2018-09-15 | End: 2018-09-24 | Stop reason: HOSPADM

## 2018-09-15 RX ORDER — ONDANSETRON 4 MG/1
4 TABLET, FILM COATED ORAL EVERY 6 HOURS PRN
Status: DISCONTINUED | OUTPATIENT
Start: 2018-09-15 | End: 2018-09-24 | Stop reason: HOSPADM

## 2018-09-15 RX ORDER — NICOTINE POLACRILEX 4 MG
15 LOZENGE BUCCAL
Status: DISCONTINUED | OUTPATIENT
Start: 2018-09-15 | End: 2018-09-24 | Stop reason: HOSPADM

## 2018-09-15 RX ORDER — ALBUTEROL SULFATE 2.5 MG/3ML
2.5 SOLUTION RESPIRATORY (INHALATION) EVERY 6 HOURS PRN
Status: DISCONTINUED | OUTPATIENT
Start: 2018-09-15 | End: 2018-09-15

## 2018-09-15 RX ORDER — MIDODRINE HYDROCHLORIDE 5 MG/1
10 TABLET ORAL 3 TIMES DAILY
Status: DISCONTINUED | OUTPATIENT
Start: 2018-09-15 | End: 2018-09-24 | Stop reason: HOSPADM

## 2018-09-15 RX ORDER — IPRATROPIUM BROMIDE AND ALBUTEROL SULFATE 2.5; .5 MG/3ML; MG/3ML
3 SOLUTION RESPIRATORY (INHALATION)
Status: DISCONTINUED | OUTPATIENT
Start: 2018-09-15 | End: 2018-09-24 | Stop reason: HOSPADM

## 2018-09-15 RX ORDER — LEVETIRACETAM 500 MG/1
1500 TABLET ORAL EVERY 12 HOURS SCHEDULED
Status: DISCONTINUED | OUTPATIENT
Start: 2018-09-15 | End: 2018-09-15

## 2018-09-15 RX ORDER — ACETAMINOPHEN 160 MG/5ML
650 SOLUTION ORAL EVERY 4 HOURS PRN
Status: DISCONTINUED | OUTPATIENT
Start: 2018-09-15 | End: 2018-09-24 | Stop reason: HOSPADM

## 2018-09-15 RX ORDER — LACOSAMIDE 100 MG/1
100 TABLET ORAL EVERY 12 HOURS SCHEDULED
Status: DISCONTINUED | OUTPATIENT
Start: 2018-09-15 | End: 2018-09-24 | Stop reason: HOSPADM

## 2018-09-15 RX ORDER — LINEZOLID 600 MG/1
600 TABLET, FILM COATED ORAL EVERY 12 HOURS SCHEDULED
Status: DISCONTINUED | OUTPATIENT
Start: 2018-09-15 | End: 2018-09-15

## 2018-09-15 RX ORDER — BUDESONIDE 0.5 MG/2ML
0.5 INHALANT ORAL
Status: DISCONTINUED | OUTPATIENT
Start: 2018-09-15 | End: 2018-09-24 | Stop reason: HOSPADM

## 2018-09-15 RX ORDER — LEVOTHYROXINE SODIUM 0.07 MG/1
75 TABLET ORAL
Status: DISCONTINUED | OUTPATIENT
Start: 2018-09-15 | End: 2018-09-24 | Stop reason: HOSPADM

## 2018-09-15 RX ADMIN — IPRATROPIUM BROMIDE AND ALBUTEROL SULFATE 3 ML: .5; 3 SOLUTION RESPIRATORY (INHALATION) at 11:42

## 2018-09-15 RX ADMIN — ATORVASTATIN CALCIUM 40 MG: 20 TABLET, FILM COATED ORAL at 09:42

## 2018-09-15 RX ADMIN — SODIUM CHLORIDE 100 ML/HR: 9 INJECTION, SOLUTION INTRAVENOUS at 01:45

## 2018-09-15 RX ADMIN — BUDESONIDE 0.5 MG: 0.5 INHALANT RESPIRATORY (INHALATION) at 20:27

## 2018-09-15 RX ADMIN — LEVETIRACETAM 1500 MG: 100 SOLUTION ORAL at 10:01

## 2018-09-15 RX ADMIN — FINASTERIDE 5 MG: 5 TABLET, FILM COATED ORAL at 09:39

## 2018-09-15 RX ADMIN — BUDESONIDE 0.5 MG: 0.5 INHALANT RESPIRATORY (INHALATION) at 08:08

## 2018-09-15 RX ADMIN — SODIUM POLYSTYRENE SULFONATE 15 G: 15 SUSPENSION ORAL; RECTAL at 13:36

## 2018-09-15 RX ADMIN — LINEZOLID 600 MG: 600 TABLET, FILM COATED ORAL at 02:32

## 2018-09-15 RX ADMIN — LACOSAMIDE 100 MG: 100 TABLET, FILM COATED ORAL at 21:46

## 2018-09-15 RX ADMIN — LEVETIRACETAM 1500 MG: 100 SOLUTION ORAL at 21:46

## 2018-09-15 RX ADMIN — LEVOTHYROXINE SODIUM 75 MCG: 75 TABLET ORAL at 06:12

## 2018-09-15 RX ADMIN — MIDODRINE HYDROCHLORIDE 10 MG: 5 TABLET ORAL at 09:40

## 2018-09-15 RX ADMIN — IPRATROPIUM BROMIDE AND ALBUTEROL SULFATE 3 ML: .5; 3 SOLUTION RESPIRATORY (INHALATION) at 20:27

## 2018-09-15 RX ADMIN — IPRATROPIUM BROMIDE AND ALBUTEROL SULFATE 3 ML: .5; 3 SOLUTION RESPIRATORY (INHALATION) at 16:23

## 2018-09-15 RX ADMIN — MIDODRINE HYDROCHLORIDE 10 MG: 5 TABLET ORAL at 21:47

## 2018-09-15 RX ADMIN — LACOSAMIDE 100 MG: 100 TABLET, FILM COATED ORAL at 09:39

## 2018-09-15 RX ADMIN — MUPIROCIN 10 APPLICATION: 20 OINTMENT TOPICAL at 13:36

## 2018-09-15 RX ADMIN — MIDODRINE HYDROCHLORIDE 10 MG: 5 TABLET ORAL at 17:47

## 2018-09-15 RX ADMIN — LINEZOLID 600 MG: 600 TABLET, FILM COATED ORAL at 09:43

## 2018-09-15 NOTE — ED NOTES
"Patient c/o generalized pain, and abdominal pain.  Patient states, \"i just don't feel good and my stomach is cramping.\"   Currently taking antibiotic for known UTI. Patient has chronic f/c and trache.   Is from home, wife at beside.   Patient denies any nausea, vomiting, CP, SOB or any other s/s at this time.  Wife states, \"when he tells me he doesn't feel good and wants to go the hospital, I take it pretty serious.\"   Patient in NAD at this time, VSS, call light within reach, patient alert.      Emma Washington, RN  09/14/18 2019    "

## 2018-09-15 NOTE — H&P
"HISTORY AND PHYSICAL   [facility]        Patient Identification:  Name: Lam Viramontes  Age: 88 y.o.  Sex: male  :  1930  MRN: [id number]                     Primary Care Physician: Suman Jaimes III, MD    Chief Complaint:  Presently the patient is very vague in his complaints.    History of Present Illness:   Pleasant 88-year-old gentleman with an extensive and very complicated medical history.  Unfortunately this point he is exceedingly vague as to what is acute issues are.  His chronic medical issues include severe dysphagia and he is G-tube dependent.  He also has a neurogenic bladder and is Chandler catheter dependent.  He has a history of seizure disorder.  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  There is a given history of previous MRSA infections as well as recurrent C. difficile colitis and of course recurrent UTIs with a chronic Chandler catheter.  His medication list includes Zyvox which reportedly was being given for ESBL UTI.  The last urinalysis I can find a record however is from February of this year.  He has a history of paroxysmal atrial fibrillation and is on chronic anticoagulation with Eliquis....  On initially asking him what he initiated his arrival at Hospital he points to the suprapubic and lower abdominal area and indicates that there is pain.  When asked specifically about pain in that area however he shrugs his shoulders.  I asked him again where he is hurting he mouths the words \"all over\".  Unfortunately I was not successful in getting much of anything more specific out of him.  Another answer that he would periodically gives is \"my wife can tell you that\".  There is a given history of back pain and a fall.  When asked about back pain he then shook his head yes and pointed to the lower back area.  This is below the area of the abnormalities on the CT scan however.    His wife is presently not available but I understand she is coming in later today.          Past " Medical History:  Past Medical History:   Diagnosis Date   • Ankylosing spondylitis lumbar region (CMS/HCC)    • BPH (benign prostatic hyperplasia)    • C. difficile diarrhea    • Constipation    • Coronary artery disease    • Diabetes mellitus (CMS/HCC)    • Dysphagia    • Hyperlipidemia    • Hypertension    • Hypothyroidism    • MRSA infection    • Neuromuscular dysfunction of bladder    • PAF (paroxysmal atrial fibrillation) (CMS/[facility])    • Pain    • Pressure ulcer of sacral region    • Seizures (CMS/HCC)    • Tracheostomy status (CMS/[facility])      Past Surgical History:  Past Surgical History:   Procedure Laterality Date   • CARDIAC SURGERY     • CORONARY ARTERY BYPASS GRAFT      x 5   • GTUBE INSERTION     • TRACHEOSTOMY        Home Meds:  Prescriptions Prior to Admission   Medication Sig Dispense Refill Last Dose   • albuterol (PROVENTIL) (5 MG/ML) 0.5% nebulizer solution Take 2.5 mg by nebulization Every 4 (Four) Hours As Needed for Wheezing or Shortness of Air.   Taking   • albuterol (PROVENTIL) (5 MG/ML) 0.5% nebulizer solution Take 5 mg by nebulization 2 (Two) Times a Day.   Taking   • apixaban (ELIQUIS) 5 MG tablet tablet 5 mg by Per G Tube route 2 (Two) Times a Day.   Taking   • atorvastatin (LIPITOR) 40 MG tablet 40 mg by Per G Tube route Daily.   Taking   • budesonide (PULMICORT) 0.5 MG/2ML nebulizer solution Take 0.5 mg by nebulization 2 (Two) Times a Day.   Taking   • finasteride (PROSCAR) 5 MG tablet 5 mg by Per G Tube route Daily.   Taking   • lacosamide (VIMPAT) 50 MG tablet tablet 100 mg by Per G Tube route Every 12 (Twelve) Hours.   Taking   • levETIRAcetam (KEPPRA) 750 MG tablet 1,500 mg by Per G Tube route 2 (Two) Times a Day.   Taking   • levothyroxine (SYNTHROID, LEVOTHROID) 75 MCG tablet 75 mcg by Per G Tube route Daily.   Taking   • linezolid (ZYVOX) 600 MG tablet 600 mg by Per G Tube route 2 (Two) Times a Day.      • midodrine (PROAMATINE) 10 MG tablet 10 mg by Per G Tube route 3 (Three) Times  a Day.      • sitaGLIPtin-metFORMIN (JANUMET)  MG per tablet 1 tablet by Per G Tube route 2 (Two) Times a Day With Meals.   Taking   • tiotropium (SPIRIVA) 18 MCG per inhalation capsule Place 1 capsule into inhaler and inhale Daily.   Taking       Allergies:  No Known Allergies  Immunizations:    There is no immunization history on file for this patient.  Social History:   Social History     Social History Narrative   • No narrative on file     Social History   Substance Use Topics   • Smoking status: Former Smoker   • Smokeless tobacco: Never Used   • Alcohol use No     Family History:  History reviewed. No pertinent family history.     Review of Systems  Review of Systems   Unable to perform ROS: Other (Please see history of present illness.  Patient presently being very vague.)       Objective:  tMax 24 hrs: Temp (24hrs), Av.3 °F (36.8 °C), Min:97.7 °F (36.5 °C), Max:98.7 °F (37.1 °C)    Vitals Ranges:   Temp:  [97.7 °F (36.5 °C)-98.7 °F (37.1 °C)] 97.7 °F (36.5 °C)  Heart Rate:  [] 83  Resp:  [16-20] 20  BP: (102-141)/(68-83) 130/69      Exam:  Physical Exam   Constitutional: He appears well-developed and well-nourished. No distress.   HENT:   Head: Normocephalic and atraumatic.   Right Ear: External ear normal.   Left Ear: External ear normal.   Nose: Nose normal.   Mouth/Throat: Oropharynx is clear and moist.   Eyes: Conjunctivae and EOM are normal. Right eye exhibits no discharge. Left eye exhibits no discharge. No scleral icterus.   Neck: No tracheal deviation present. No thyromegaly present.   Tracheostomy in place.   Cardiovascular: Normal rate, regular rhythm, normal heart sounds and intact distal pulses.    Pulmonary/Chest: Effort normal and breath sounds normal. No stridor. No respiratory distress. He has no wheezes. He has no rales. He exhibits no tenderness.   Scattered loose rhonchi.   Abdominal: Soft. Bowel sounds are normal. He exhibits no distension and no mass. There is no  tenderness. There is no rebound and no guarding.   PEG tube in place.   Musculoskeletal: He exhibits no edema.   Neurological: He is alert.   Oriented to person and the fact that he is in a hospital.  He is cooperative.  Good eye contact.  Good interaction despite being exceedingly vague.  No appreciable lateralizing signs.  He did seem to have some difficulty following orders with muscle strength exam.   Skin: Skin is warm and dry. He is not diaphoretic.   Psychiatric: He has a normal mood and affect.   Please see neurologic exam.       Data Review:  All labs and radiology reviewed.    Assessment:  Principal Problem:    Closed compression fracture of L1 lumbar vertebra:  Discussed with spinal surgery.  We will hold off on any invasive procedures today and try to tune him up medically as best we can.  He was also on Eliquis to just last night and recommend this be put on hold longer before spinal surgery.  Active Problems:    Hyperkalemia:  Remaining between 5.9 and 5.5.  Does not appear to be taking any medications notorious for hyperkalemia.  I'll give him one small dose of Kayexalate and recheck in the morning.  No concerning ST-T changes on the EKG.      Tracheostomy status:  Presently appears be functioning well.  With this as well as COPD I'll request pulmonary perioperative eval and management.      Seizures:  Continue home meds.      PAF (paroxysmal atrial fibrillation) (CMS/[facility]):  Presently in normal sinus rhythm with first-degree AV block.  There is also a bifascicular block.  Also with history of coronary artery disease and I will request a cardiology evaluation.     Anticoagulated: Eliquis on hold.      Neuromuscular dysfunction of bladder:  Chronic indwelling Chandler catheter.  The urine in the catheter appears clear yellow in today's urinalysis is unremarkable.  On presentation the patient is reportedly taking Zyvox for reported ESBL UTI.  Present urinalysis is unremarkable and the nurse urinalysis I can  see is from February of this year.  He also has a history of recurrent C. difficile colitis.  I'll discontinue to Zyvox and get infectious disease involved also.      Hypothyroidism:  Recheck his TSH level specially noting his macrocytosis.      Gastrostomy tube dependent (CMS/HCC)      COPD (chronic obstructive pulmonary disease) (CMS/HCC):  Please see above.      CAD (coronary artery disease):  We see above.      AV block, 1st degree:      Bifascicular block:      Macrocytic anemia:  Check B12, folate...      Diabetes type 2, controlled (CMS/HCC)      History of Clostridium difficile colitis:  Please see comments under neurogenic bladder.  Avoid unnecessary antibiotics:     History of MRSA infection: Will start Bactroban nasal ointment preop.             Plan:  Please see above.  Discussed with spinal surgery as well as the RN.  Plan surgery for this afternoon will be postponed until further stabilization and until he is been off the Eliquis at least 48 hrs.    Rob Rabago MD  9/15/2018  10:03 AM     Discussed w pt's wife.  Hx also vague.  She just notes that he has not been feeling well in general.  Called but RN the pt HR in the 30.   BP stable and pt asymptomatic.  Will trans to Tele.  Cardio eval pending.     EMR Dragon/Transcription disclaimer:   Much of this encounter note is an electronic transcription/translation of spoken language to printed text. The electronic translation of spoken language may permit erroneous, or at times, nonsensical words or phrases to be inadvertently transcribed; Although I have reviewed the note for such errors, some may still exist.

## 2018-09-15 NOTE — PROGRESS NOTES
Clinical Pharmacy Services: Medication History    Lam Viramontes is a 88 y.o. male presenting to [facility] for   Chief Complaint   Patient presents with   • Weakness - Generalized       He  has a past medical history of Ankylosing spondylitis lumbar region (CMS/[facility]); BPH (benign prostatic hyperplasia); C. difficile diarrhea; Constipation; Coronary artery disease; Diabetes mellitus (CMS/[facility]); Dysphagia; Hyperlipidemia; Hypertension; Hypothyroidism; MRSA infection; Neuromuscular dysfunction of bladder; PAF (paroxysmal atrial fibrillation) (CMS/[facility]); Pain; Pressure ulcer of sacral region; Seizures (CMS/[facility]); and Tracheostomy status (CMS/[facility]).    Allergies as of 09/14/2018   • (No Known Allergies)       Medication information was obtained from: Medication bottles, spouse  Pharmacy and Phone Number: [facility] [phone]    Prior to Admission Medications     Prescriptions Last Dose Informant Patient Reported? Taking?    albuterol (PROVENTIL) (5 MG/ML) 0.5% nebulizer solution  Medication Bottle Yes Yes    Take 2.5 mg by nebulization Every 4 (Four) Hours As Needed for Wheezing or Shortness of Air.    albuterol (PROVENTIL) (5 MG/ML) 0.5% nebulizer solution  Medication Bottle Yes Yes    Take 5 mg by nebulization 2 (Two) Times a Day.    apixaban (ELIQUIS) 5 MG tablet tablet  Medication Bottle Yes Yes    5 mg by Per G Tube route 2 (Two) Times a Day.    atorvastatin (LIPITOR) 40 MG tablet  Medication Bottle Yes Yes    40 mg by Per G Tube route Daily.    budesonide (PULMICORT) 0.5 MG/2ML nebulizer solution  Medication Bottle Yes Yes    Take 0.5 mg by nebulization 2 (Two) Times a Day.    finasteride (PROSCAR) 5 MG tablet  Medication Bottle Yes Yes    5 mg by Per G Tube route Daily.    lacosamide (VIMPAT) 50 MG tablet tablet  Medication Bottle Yes Yes    100 mg by Per G Tube route Every 12 (Twelve) Hours.    levETIRAcetam (KEPPRA) 750 MG tablet  Medication Bottle Yes Yes    1,500 mg by Per G Tube route 2 (Two) Times a  Day.    levothyroxine (SYNTHROID, LEVOTHROID) 75 MCG tablet  Medication Bottle Yes Yes    75 mcg by Per G Tube route Daily.    linezolid (ZYVOX) 600 MG tablet  Medication Bottle Yes Yes    600 mg by Per G Tube route 2 (Two) Times a Day.    midodrine (PROAMATINE) 10 MG tablet  Medication Bottle Yes Yes    10 mg by Per G Tube route 3 (Three) Times a Day.    sitaGLIPtin-metFORMIN (JANUMET)  MG per tablet  Medication Bottle Yes Yes    1 tablet by Per G Tube route 2 (Two) Times a Day With Meals.    tiotropium (SPIRIVA) 18 MCG per inhalation capsule  Medication Bottle Yes Yes    Place 1 capsule into inhaler and inhale Daily.            Medication notes: Removed per spouse, patient stopped at discharge from rehab facility:  Bisacodyl, Floranex, MOM, Calmoseptine, Promod, Nystatin suspension, Senna, Oxybutinin, Melatonin, Fleet Enema, Isosource    This medication list is complete to the best of my knowledge as of 9/14/2018    Please call if questions.    Reba Knight, Medication History Technician  9/14/2018 9:48 PM

## 2018-09-15 NOTE — NURSING NOTE
Spoke to Dr. Jose Daniel Tyler, Neurosurgery, OK to feed patient, surgery will not be until next week.

## 2018-09-15 NOTE — SIGNIFICANT NOTE
09/15/18 1540   Rehab Time/Intention   Evaluation Not Performed patient/family declined evaluation  (Explained to patient the importance of getting up; refused PT twice. Will check back on patient tomorrow for PT evaluation.)   Rehab Treatment   Discipline physical therapist   Recommendation   PT - Next Appointment 09/16/18

## 2018-09-15 NOTE — CONSULTS
Referring Provider: Navneet Yates MD  [address]  [place] [address]    Reason for Consultation: history of UTI and C diff    History of present illness:  Mr Viramontes is a 88 YOM who I am asked to evaluate and give opinion for  history of UTI and C diff. History is obtained from the patient and review of the old medical records which I summarize/synthesize as follows: He has a history of trach, PEG, anklylosis spondylitis and a chronic Chandler due to neurogenic bladder. He had a fall at home and came in with back pain. He was found to have fracture and is going to OR with NSGY once he is medically cleared.    He says that he is often on antibiotics at the direction of his urologist due to positive urine cultures. Notes state that he was on linezolid for ESBL infection prior to admission which would be an strange choice so I suspect either the antibiotic or the organism is incorrect. He currently has no suprapubic pain or abdominal pain. The urine in the Chandler bag is clear.     Past Medical History:   Diagnosis Date   • Ankylosing spondylitis lumbar region (CMS/HCC)    • BPH (benign prostatic hyperplasia)    • C. difficile diarrhea    • Constipation    • Coronary artery disease    • Diabetes mellitus (CMS/HCC)    • Dysphagia    • Hyperlipidemia    • Hypertension    • Hypothyroidism    • MRSA infection    • Neuromuscular dysfunction of bladder    • PAF (paroxysmal atrial fibrillation) (CMS/HCC)    • Pain    • Pressure ulcer of sacral region    • Seizures (CMS/HCC)    • Tracheostomy status (CMS/HCC)        Past Surgical History:   Procedure Laterality Date   • CARDIAC SURGERY     • CORONARY ARTERY BYPASS GRAFT      x 5   • GTUBE INSERTION     • TRACHEOSTOMY           Social History:  Lives w/ wife in [facility]  retired    Family History:  No 1st degree relatives w/ recurrent UTIs    Allergies:  No Antibiotic Allergies    Medications:    Current Facility-Administered Medications:   •  acetaminophen  (TYLENOL) 160 MG/5ML solution 650 mg, 650 mg, Per G Tube, Q4H PRN, Rob Rabago MD  •  acetaminophen (TYLENOL) tablet 650 mg, 650 mg, Oral, Q4H PRN, Navneet Yates MD  •  albuterol (PROVENTIL) nebulizer solution 0.083% 2.5 mg/3mL, 2.5 mg, Nebulization, BID, Rbo Rabago MD  •  albuterol (PROVENTIL) nebulizer solution 0.5% 2.5 mg/0.5mL, 2.5 mg, Nebulization, Q4H PRN, Navneet Yates MD  •  atorvastatin (LIPITOR) tablet 40 mg, 40 mg, Per G Tube, Daily, Navneet Yates MD, 40 mg at 09/15/18 0942  •  bisacodyl (DULCOLAX) EC tablet 5 mg, 5 mg, Oral, Daily PRN, Navneet Yates MD  •  bisacodyl (DULCOLAX) suppository 10 mg, 10 mg, Rectal, Daily PRN, Navneet Yates MD  •  budesonide (PULMICORT) nebulizer solution 0.5 mg, 0.5 mg, Nebulization, BID - RT, Navneet Yates MD, 0.5 mg at 09/15/18 0808  •  dextrose (D50W) 25 g/ 50mL Intravenous Solution 25 g, 25 g, Intravenous, Q15 Min PRN, Navneet Yates MD  •  dextrose (GLUTOSE) oral gel 15 g, 15 g, Oral, Q15 Min PRN, Navneet Yates MD  •  finasteride (PROSCAR) tablet 5 mg, 5 mg, Oral, Daily, Navneet Yates MD, 5 mg at 09/15/18 0939  •  glucagon (human recombinant) (GLUCAGEN DIAGNOSTIC) injection 1 mg, 1 mg, Subcutaneous, PRN, Navneet Yates MD  •  HYDROcodone-acetaminophen (NORCO) 5-325 MG per tablet 1 tablet, 1 tablet, Oral, Q6H PRN, Navneet Yates MD  •  insulin aspart (novoLOG) injection 0-9 Units, 0-9 Units, Subcutaneous, 4x Daily With Meals & Nightly, Navneet Yates MD  •  ipratropium-albuterol (DUO-NEB) nebulizer solution 3 mL, 3 mL, Nebulization, 4x Daily - RT, Rob Rabago MD  •  lacosamide (VIMPAT) tablet 100 mg, 100 mg, Per G Tube, Q12H, Navneet Yates MD, 100 mg at 09/15/18 0939  •  levETIRAcetam (KEPPRA) 100 MG/ML solution 1,500 mg, 1,500 mg, Per G Tube, Q12H, Rob Rabago MD, 1,500 mg at 09/15/18 1001  •  levothyroxine (SYNTHROID, LEVOTHROID) tablet 75 mcg, 75  mcg, Per G Tube, Q AM, Navneet Yates MD, 75 mcg at 09/15/18 0612  •  midodrine (PROAMATINE) tablet 10 mg, 10 mg, Per G Tube, TID, Navneet Yates MD, 10 mg at 09/15/18 0940  •  mupirocin (BACTROBAN) 2 % nasal ointment, , Each Nare, BID, Rob Rabago MD  •  ondansetron (ZOFRAN) tablet 4 mg, 4 mg, Oral, Q6H PRN **OR** ondansetron ODT (ZOFRAN-ODT) disintegrating tablet 4 mg, 4 mg, Oral, Q6H PRN **OR** ondansetron (ZOFRAN) injection 4 mg, 4 mg, Intravenous, Q6H PRN, Navneet Yates MD  •  ondansetron (ZOFRAN) tablet 4 mg, 4 mg, Oral, Q6H PRN **OR** ondansetron ODT (ZOFRAN-ODT) disintegrating tablet 4 mg, 4 mg, Oral, Q6H PRN **OR** ondansetron (ZOFRAN) injection 4 mg, 4 mg, Intravenous, Q6H PRN, Rob Rabago MD  •  sodium chloride 0.9 % flush 1-10 mL, 1-10 mL, Intravenous, PRN, Navneet Yates MD  •  sodium chloride 0.9 % flush 1-10 mL, 1-10 mL, Intravenous, PRN, Rob Rabago MD  •  Insert peripheral IV, , , Once **AND** sodium chloride 0.9 % flush 10 mL, 10 mL, Intravenous, PRN, Oswaldo Schmidt MD  •  sodium polystyrene (KAYEXALATE) 15 GM/60ML suspension 15 g, 15 g, Per G Tube, Once, Rob Rabago MD    Review of Systems  All systems were reviewed and are negative unless otherwise stated above in the HPI    Objective   Vital Signs   Temp:  [97.7 °F (36.5 °C)-98.7 °F (37.1 °C)] 97.7 °F (36.5 °C)  Heart Rate:  [] 83  Resp:  [16-20] 20  BP: (102-141)/(68-83) 130/69    Physical Exam:   General: awake, alert, NAD   Head: Normocephalic, atraumatic  Eyes: PERRL, EOMI, no scleral icterus, no conjunctival pallor, no conjunctival hemorrhages.   ENT: MMM, OP clear, no thrush. Good dentition.   Neck: Supple, no visible thyromegaly  Cardiovascular: NR, RR, no murmurs, rubs, or gallops; no LE edema  Respiratory: Lungs are clear to auscultation bilaterally, no rales or wheezing; normal work of breathing on ambient air  GI: Abdomen is soft, non-tender, non-distended, normal  bowel sounds in all four quadrants; no hepatosplenomegaly, no masses palpated  : no Chandler catheter present  Musculoskeletal: no joint abnormalities, normal musculature  Skin: No rashes, lesions, or embolic phenomenon  Neurological: Alert and oriented x 3, cranial nerves 2-12 grossly intact, motor strength 5/5 in all four extremities  Psychiatric: Normal mood and affect   Lymph: no pre-auricular, post-auricular, submandibular, cervical, supraclavicular  LAD  Vasc: no cyanosis; PIV w/o erythema    Labs:     Lab Results   Component Value Date    WBC 8.63 09/15/2018    HGB 12.2 (L) 09/15/2018    HCT 39.6 (L) 09/15/2018    .7 (H) 09/15/2018     09/15/2018       Lab Results   Component Value Date    GLUCOSE 97 09/15/2018    BUN 38 (H) 09/15/2018    CREATININE 0.86 09/15/2018    EGFRIFNONA 84 09/15/2018    BCR 44.2 (H) 09/15/2018    CO2 25.9 09/15/2018    CALCIUM 9.2 09/15/2018    ALBUMIN 3.40 (L) 09/14/2018    AST 53 (H) 09/14/2018    ALT 50 (H) 09/14/2018     UA negative with 0-2 WBCs  Procal 0.15    Microbiology:  9/14 BCx: NGTD  9/14 UCx:  NGTD    Radiology (personally reviewed images/report):  CT spine with L1 compression fracture    Assessment/Plan   1. Lumbar compression fracture  2. Chronic Chandler due to neurogenic bladder  3. History of C diff    He has no current  symptoms and the urine is clear. No role for antibiotics. I would guard against frequent urine testing and advise caution interpreting his urine cultures with chronic Chandler in place unless there are specific  symptoms. Especially in the context of history of C diff.    No antibiotics are needed currently.     Thank you for this consult. ID will sign off.

## 2018-09-15 NOTE — PLAN OF CARE
Problem: Fall Risk (Adult)  Goal: Identify Related Risk Factors and Signs and Symptoms  Outcome: Outcome(s) achieved Date Met: 09/15/18    Goal: Absence of Fall  Outcome: Ongoing (interventions implemented as appropriate)      Problem: Skin Injury Risk (Adult)  Goal: Identify Related Risk Factors and Signs and Symptoms  Outcome: Outcome(s) achieved Date Met: 09/15/18    Goal: Skin Health and Integrity  Outcome: Ongoing (interventions implemented as appropriate)      Problem: Infection, Risk/Actual (Adult)  Goal: Identify Related Risk Factors and Signs and Symptoms  Outcome: Outcome(s) achieved Date Met: 09/15/18    Goal: Infection Prevention/Resolution  Outcome: Ongoing (interventions implemented as appropriate)      Problem: Airway, Artificial (Adult)  Goal: Signs and Symptoms of Listed Potential Problems Will be Absent, Minimized or Managed (Airway, Artificial)  Outcome: Ongoing (interventions implemented as appropriate)      Problem: Nutrition, Enteral (Adult)  Goal: Signs and Symptoms of Listed Potential Problems Will be Absent, Minimized or Managed (Nutrition, Enteral)  Outcome: Ongoing (interventions implemented as appropriate)      Problem: Pain, Acute (Adult)  Goal: Identify Related Risk Factors and Signs and Symptoms  Outcome: Outcome(s) achieved Date Met: 09/15/18    Goal: Acceptable Pain Control/Comfort Level  Outcome: Ongoing (interventions implemented as appropriate)      Problem: Seizure Disorder/Epilepsy (Adult)  Goal: Signs and Symptoms of Listed Potential Problems Will be Absent, Minimized or Managed (Seizure Disorder/Epilepsy)  Outcome: Ongoing (interventions implemented as appropriate)      Problem: Patient Care Overview  Goal: Plan of Care Review  Outcome: Ongoing (interventions implemented as appropriate)   09/15/18 0873   Coping/Psychosocial   Plan of Care Reviewed With patient   Plan of Care Review   Progress improving   OTHER   Outcome Summary meds per order, seizure precautions, maintained,  trach in place, no falls,skin care per protocol, see vs and labs     Goal: Individualization and Mutuality  Outcome: Ongoing (interventions implemented as appropriate)    Goal: Discharge Needs Assessment  Outcome: Ongoing (interventions implemented as appropriate)    Goal: Interprofessional Rounds/Family Conf  Outcome: Ongoing (interventions implemented as appropriate)

## 2018-09-15 NOTE — CONSULTS
[facility]  Phone: [phone]  Handy Vilchis MD      Subjective   LOS: 0 days     Thank you for this consultation.  88-year-old male who moved up here from [place] in November of last year.  Patient apparently had a seizure for years ago of unknown etiology.  He had a subsequent prolonged stay in the hospital on a ventilator.  Tracheostomy was placed.  In the last 6 months he has developed dysphagia.  He was losing weight.  He was at risk of aspiration.  Therefore a feeding PEG tube was placed.  He follows with Dr. Fine ENT at [facility].  His wife informs me his trach was last changed in November last year.  Patient was brought in after a fall which occurred November 28.  Initial routine x-rays did not show any injury.  However subsequent CT showed closed compression fracture of L1 lumbar vertebrae.  Instrumented arthrodesis is planned.  Please note as a result of his injuries patient has chronic Chandler.  In addition paroxysmal atrial fibrillation on anticoagulation chronically.  He also has ankylosing spondylitis.  Underlying coronary artery disease with CABG 20 years ago.  Diabetes mellitus.  Hypertension and hypothyroidism.  Patient quit smoking approximately 10 years ago.  He snores were smoked more than a pack of cigarettes a day.  He does not have a recorded diagnosis of COPD.    Lam Viramontes  reports that he does not drink alcohol.,  reports that he has quit smoking. He has never used smokeless tobacco.     Past Hx:  has a past medical history of Ankylosing spondylitis lumbar region (CMS/HCC); BPH (benign prostatic hyperplasia); C. difficile diarrhea; Constipation; Coronary artery disease; Diabetes mellitus (CMS/HCC); Dysphagia; Hyperlipidemia; Hypertension; Hypothyroidism; MRSA infection; Neuromuscular dysfunction of bladder; PAF (paroxysmal atrial fibrillation) (CMS/[facility]); Pain; Pressure ulcer of sacral region; Seizures (CMS/[facility]); and Tracheostomy status  (CMS/[facility]).  Surg Hx:  has a past surgical history that includes Tracheostomy tube placement; Gastrostomy tube placement; Cardiac surgery; and Coronary artery bypass graft.  FH: family history is not on file.  SH:  reports that he has quit smoking. He has never used smokeless tobacco. He reports that he does not drink alcohol or use drugs.    Prescriptions Prior to Admission   Medication Sig Dispense Refill Last Dose   • albuterol (PROVENTIL) (5 MG/ML) 0.5% nebulizer solution Take 2.5 mg by nebulization Every 4 (Four) Hours As Needed for Wheezing or Shortness of Air.   Taking   • albuterol (PROVENTIL) (5 MG/ML) 0.5% nebulizer solution Take 5 mg by nebulization 2 (Two) Times a Day.   Taking   • apixaban (ELIQUIS) 5 MG tablet tablet 5 mg by Per G Tube route 2 (Two) Times a Day.   Taking   • atorvastatin (LIPITOR) 40 MG tablet 40 mg by Per G Tube route Daily.   Taking   • budesonide (PULMICORT) 0.5 MG/2ML nebulizer solution Take 0.5 mg by nebulization 2 (Two) Times a Day.   Taking   • finasteride (PROSCAR) 5 MG tablet 5 mg by Per G Tube route Daily.   Taking   • lacosamide (VIMPAT) 50 MG tablet tablet 100 mg by Per G Tube route Every 12 (Twelve) Hours.   Taking   • levETIRAcetam (KEPPRA) 750 MG tablet 1,500 mg by Per G Tube route 2 (Two) Times a Day.   Taking   • levothyroxine (SYNTHROID, LEVOTHROID) 75 MCG tablet 75 mcg by Per G Tube route Daily.   Taking   • linezolid (ZYVOX) 600 MG tablet 600 mg by Per G Tube route 2 (Two) Times a Day.      • midodrine (PROAMATINE) 10 MG tablet 10 mg by Per G Tube route 3 (Three) Times a Day.      • sitaGLIPtin-metFORMIN (JANUMET)  MG per tablet 1 tablet by Per G Tube route 2 (Two) Times a Day With Meals.   Taking   • tiotropium (SPIRIVA) 18 MCG per inhalation capsule Place 1 capsule into inhaler and inhale Daily.   Taking     No Known Allergies    Review of Systems   Unable to perform ROS: Patient nonverbal   No obvious pain.  He Shrugs his shoulders when I ask how he is  doing.    Vital Signs past 24hrs  BP range: BP: (102-141)/(66-83) 121/72  Pulse range: Heart Rate:  [] 75  Resp rate range: Resp:  [16-20] 20  Temp range: Temp (24hrs), Av.3 °F (36.8 °C), Min:97.7 °F (36.5 °C), Max:98.7 °F (37.1 °C)    Oxygen range: SpO2:  [88 %-98 %] 96 %; Flow (L/min):  [6] 6;   Device (Oxygen Therapy): tracheostomy collar  64.4 kg (142 lb); Body mass index is 20.96 kg/m².  I/O this shift:  In: 500 [Other:250; NG/GT:250]  Out: 350 [Urine:350]    Adult male.  Currently with tracheostomy and trach collar.  His size 6 non-cuffed Shiley is missing inner cannula.  Wife states it fell out yesterday.  Pupils equal and reactive to light.  Oropharynx moist.  Nasopharynx without discharge.  Tracheostomy noted.  Trach stoma and size 6 Shiley.  Lungs reveal bilateral air entry.  Somewhat coarse breath sounds which sound conducted.  No obvious wheeze.  Overall air entry seems diminished but patient appears unable to take deep breaths.  Percussion note is resonant bilaterally.  Chest expansion is equal.  Heart examination S1-S2 present, rhythm regular.  No murmurs.  No edema lower extremities.  Abdomen is soft nontender bowel sounds present.  PEG tube noted.  No liver spleen enlargement.  No peripheral cyanosis clubbing.  Moves all 4 extremities though weak.  No cervical, axilla, inguinal adenopathy.    Results Review:    I have reviewed the laboratory and imaging data from current admission. My annotations are as noted in assessment and plan.    Medication Review:  I have reviewed the current MAR. My annotations are as noted in assessment and plan.    Plan   PCCM Problems  Chronic Tracheostomy with trach collar  Ex-smoker with possible underlying COPD  Chronic dysphagia with PEG feeding tube  Relevant Medical Diagnoses  Lumbar compression fracture pending intervention  PAF  Diabetes  Chronic hypotension on midodrine    Plan of Treatment  I reviewed with the wife as well as with the nurse.  I will  replace his size 6 uncuffed Shiley.  I will try to get him a fenestrated tube to see if he is able to talk.  He will continue follow-up with ENT outpatient.  - Actually as noted that he may go to surgery.  I will therefore place a cuffed size 6 Shiley for now he can be placed on a ventilator if needed for planned operative procedure.    Good pulmonary toilet with nebulizer treatments is suggested.  I will start her on same.    Continue with PEG feeding tube as before.  Apparently outpatient plans to reevaluate for swallowing.      Part of this note may be an electronic transcription/translation of spoken language to printed text using the Dragon Dictation System.

## 2018-09-15 NOTE — CONSULTS
Referring Provider: Wil Rabago MD  Reason for Consultation: L1 fracture    Patient Care Team:  Suman Jaimes III, MD as PCP - General (Internal Medicine)    Chief complaint neck pain    Subjective .     History of present illness:  Sushil Viramontes is an 88-year-old gentleman who approximately 2 weeks ago fell and experienced acute back pain.  He went to the emergency department where evaluation performed in a straight no abnormality.  With the past weeks he's become more debilitating with the back pain where he can't stand or even roll over in bed.  He does have a past medical history significant for traumatic event several years back which left him intubated and recurrent seizures.  He currently takes antiepileptics for this and uses a walker to get around.  He was trached and pegged and this is been chronic and he has a chronic indwelling catheter.  He has for the most part and mobility issues.  He states he does get around relatively well with his walker.  He presented back to the emergency department due to the intense pain or CT scan was performed demonstrating an L1 fracture.  He denies fevers, chills, no new weakness or new gait dysfunction.    Review of Systems  Pertinent items are noted in HPI    History  Past Medical History:   Diagnosis Date   • Ankylosing spondylitis lumbar region (CMS/HCC)    • BPH (benign prostatic hyperplasia)    • C. difficile diarrhea    • Constipation    • Coronary artery disease    • Diabetes mellitus (CMS/HCC)    • Dysphagia    • Hyperlipidemia    • Hypertension    • Hypothyroidism    • MRSA infection    • Neuromuscular dysfunction of bladder    • PAF (paroxysmal atrial fibrillation) (CMS/HCC)    • Pain    • Pressure ulcer of sacral region    • Seizures (CMS/HCC)    • Tracheostomy status (CMS/[facility])    ,   Past Surgical History:   Procedure Laterality Date   • CARDIAC SURGERY     • CORONARY ARTERY BYPASS GRAFT      x 5   • GTUBE INSERTION     • TRACHEOSTOMY     , History  reviewed. No pertinent family history. and   Social History   Substance Use Topics   • Smoking status: Former Smoker   • Smokeless tobacco: Never Used   • Alcohol use No       Objective     Vital Signs   Temp:  [97.7 °F (36.5 °C)-98.7 °F (37.1 °C)] 97.7 °F (36.5 °C)  Heart Rate:  [] 79  Resp:  [16-20] 20  BP: (102-141)/(68-83) 130/69    Physical Exam:  Alert and Oriented x 3   Is able to articulate well with occlusion of his trach  He has no focal motor deficits  Cranial nerves II through XII are intact  Reflexes 1+  Sensations intact all modalities  Evidence of DVT  Small sacral decubitus chronic nature    Results Review:   I reviewed the patient's new clinical results.    Of lumbar spine does demonstrate an L1 fracture.  He has ankylosing spondylitis autofusion of every level in the imaged field.  The fracture does extend back through the pedicles and the lamina and this is more consistent with a Chance fracture which is an unstable fracture.  Assessment/Plan     Active Problems:    Closed compression fracture of L1 lumbar vertebra (CMS/HCC)      Mr Viramontes I feel that a 3 column injury fracture secondary to a fall and due to his ankylosing spondylitis.  At this time we've had a discussion today at this is an unstable fracture.  I do not feel that bracing would be appropriate.  Given the ways of 88 he is still relatively mobile and his pain is becoming quite debilitating.  At this time I proposed doing instrumented arthrodesis from T12 to L2.  We discussed the risk and benefits of the procedure being bleeding, death, paralysis, infection, failure the procedure and further procedures.  He is hyperchloremic at this time and will need to be cleared medically.  We will hopefully try to get him onto the books for today.  We will keep him nothing by mouth at this time.    I discussed the patients findings and my recommendations with patient    Jose Daniel Tyler MD  09/15/18  9:21 AM    Time:

## 2018-09-15 NOTE — PLAN OF CARE
Problem: Nutrition, Enteral (Adult)  Intervention: Monitor/Manage Nutrition Support   09/15/18 1109   Nutrition Interventions   Nutrition Support Management (begin TF's per home regimen)

## 2018-09-15 NOTE — CONSULTS
Patient Name: Lam Viramontes  :1930  88 y.o.    Date of Admission: 2018  Date of Consultation:  09/15/18  Encounter Provider: Kam Pedraza III, MD  Place of Service: [facility]  Referring Provider: Navneet Yates MD  Patient Care Team:  Suman Jaimes III, MD as PCP - General (Internal Medicine)      Chief complaint: weakness.    History of Present Illness:    Mr. Viramontes is an 88 year old man with history of Afib (eliquis), CAD s/p CABG, diabetes, hypertension, COPD, seizures, tracheostomy, PEG tube placement, and ankylosing spondylitis of the lumbar region. He was seen at [facility] in 2017 for bradycardia. He had a holter monitor which showed first degree AV block and RBBB with multiple episodes of Mobitz type I AV block. An echocardiogram showed preserved LV systolic and diastolic dysfunction and no evidence of valvular disease. He by Dr. Fitch in consultation in 2017 for chest pain. He had a myocardial perfusion stress test which was negative for ischemia.     He presented with worsening weakness and was diagnosed with acute L1 compression fraction.  This was demonstrated by CT of the lumbar spine.  Neurosurgery evaluated the patient and anticipates surgery later today.    He has had some episodes of sinus bradycardia.  EKG demonstrates sinus rhythm with a first-degree AV block.  He denies any presyncope or syncope.  He is not having chest pain, pressure, tightness, squeezing, or heartburn.  No shortness of breath at this point.      Cardiac Testing:  Myocardial Perfusion Stress Test 17  Interpretation Summary     · Equivocal ECG evidence of myocardial ischemia.Negative clinical evidence of myocardial ischemia.  · Left ventricular ejection fraction is normal (Calculated EF = 64%).  · Myocardial perfusion imaging indicates a normal myocardial perfusion study with no evidence of ischemia.  · Impressions are  consistent with a low risk study.     Echocardiogram 11/29/17 [facility]      Holter Monitor 11/29/17 [facility]      Past Medical History:   Diagnosis Date   • Ankylosing spondylitis lumbar region (CMS/[facility])    • BPH (benign prostatic hyperplasia)    • C. difficile diarrhea    • Constipation    • Coronary artery disease    • Diabetes mellitus (CMS/[facility])    • Dysphagia    • Hyperlipidemia    • Hypertension    • Hypothyroidism    • MRSA infection    • Neuromuscular dysfunction of bladder    • PAF (paroxysmal atrial fibrillation) (CMS/[facility])    • Pain    • Pressure ulcer of sacral region    • Seizures (CMS/[facility])    • Tracheostomy status (CMS/[facility])        Past Surgical History:   Procedure Laterality Date   • CARDIAC SURGERY     • CORONARY ARTERY BYPASS GRAFT      x 5   • GTUBE INSERTION     • TRACHEOSTOMY           Prior to Admission medications    Medication Sig Start Date End Date Taking? Authorizing Provider   albuterol (PROVENTIL) (5 MG/ML) 0.5% nebulizer solution Take 2.5 mg by nebulization Every 4 (Four) Hours As Needed for Wheezing or Shortness of Air.   Yes ProviderArchie MD   albuterol (PROVENTIL) (5 MG/ML) 0.5% nebulizer solution Take 5 mg by nebulization 2 (Two) Times a Day.   Yes Provider, MD Archie   apixaban (ELIQUIS) 5 MG tablet tablet 5 mg by Per G Tube route 2 (Two) Times a Day.   Yes ProviderArchie MD   atorvastatin (LIPITOR) 40 MG tablet 40 mg by Per G Tube route Daily.   Yes ProviderArchie MD   budesonide (PULMICORT) 0.5 MG/2ML nebulizer solution Take 0.5 mg by nebulization 2 (Two) Times a Day.   Yes ProviderArchie MD   finasteride (PROSCAR) 5 MG tablet 5 mg by Per G Tube route Daily.   Yes ProviderArchie MD   lacosamide (VIMPAT) 50 MG tablet tablet 100 mg by Per G Tube route Every 12 (Twelve) Hours.   Yes ProviderArchie MD   levETIRAcetam (KEPPRA) 750 MG tablet 1,500 mg by Per G Tube route 2 (Two) Times a Day.   Yes Archie Monroy MD  "  levothyroxine (SYNTHROID, LEVOTHROID) 75 MCG tablet 75 mcg by Per G Tube route Daily.   Yes ProviderArchie MD   linezolid (ZYVOX) 600 MG tablet 600 mg by Per G Tube route 2 (Two) Times a Day. 9/10/18 9/17/18 Yes Archie Monroy MD   midodrine (PROAMATINE) 10 MG tablet 10 mg by Per G Tube route 3 (Three) Times a Day.   Yes Archie Monroy MD   sitaGLIPtin-metFORMIN (JANUMET)  MG per tablet 1 tablet by Per G Tube route 2 (Two) Times a Day With Meals.   Yes ProviderArchie MD   tiotropium (SPIRIVA) 18 MCG per inhalation capsule Place 1 capsule into inhaler and inhale Daily.   Yes ProviderArchie MD       No Known Allergies    Social History     Social History   • Marital status:      Social History Main Topics   • Smoking status: Former Smoker   • Smokeless tobacco: Never Used   • Alcohol use No   • Drug use: No   • Sexual activity: Defer     Other Topics Concern   • Not on file       History reviewed. No pertinent family history.    REVIEW OF SYSTEMS:   All systems reviewed.  Pertinent positives identified in HPI.  All other systems are negative.      Objective:     Vitals:    09/15/18 1054 09/15/18 1100 09/15/18 1142 09/15/18 1159   BP:  128/80     BP Location:  Right arm     Patient Position:  Lying     Pulse:   79 (!) 48   Resp:   20 20   Temp:       TempSrc:       SpO2:   95%    Weight: 64.4 kg (142 lb)      Height: 175.3 cm (69.02\")        Body mass index is 20.96 kg/m².    Physical Exam:  General Appearance:    Alert   Head:    Normocephalic, without obvious abnormality, atraumatic   Eyes:            Lids and lashes normal, conjunctivae and sclerae normal, no   icterus, no pallor, corneas clear, PERRLA   Ears:    Ears appear intact with no abnormalities noted   Throat:   No oral lesions, no thrush, oral mucosa moist   Neck:   No adenopathy, supple, trachea midline, no thyromegaly, no   carotid bruit, no JVD   Back:     No kyphosis present, no scoliosis present, no " skin lesions, erythema or scars, no tenderness to percussion or palpation, range of motion normal   Lungs:     Clear to auscultation,respirations regular, even and unlabored    Heart:    Regular rhythm and normal rate, normal S1 and S2, no murmur, no gallop, no rub, no click   Chest Wall:    No abnormalities observed   Abdomen:     Normal bowel sounds, no masses, no organomegaly, soft        non-tender, non-distended, no guarding, no rebound  tenderness   Extremities:   no edema, no cyanosis, no redness   Pulses:   Pulses palpable and equal bilaterally. Normal radial, carotid, femoral, dorsalis pedis and posterior tibial pulses bilaterally. Normal abdominal aorta   Skin:  Psychiatric:   No bleeding, bruising or rash    Alert         Lab Review:       Results from last 7 days  Lab Units 09/15/18  0434  09/14/18 2002   SODIUM mmol/L 133*  < > 131*   POTASSIUM mmol/L 5.5*  < > 5.9*   CHLORIDE mmol/L 97*  < > 93*   CO2 mmol/L 25.9  < > 28.1   BUN mg/dL 38*  < > 43*   CREATININE mg/dL 0.86  < > 0.96   CALCIUM mg/dL 9.2  < > 9.9   BILIRUBIN mg/dL  --   --  0.3   ALK PHOS U/L  --   --  149*   ALT (SGPT) U/L  --   --  50*   AST (SGOT) U/L  --   --  53*   GLUCOSE mg/dL 97  < > 94   < > = values in this interval not displayed.    Results from last 7 days  Lab Units 09/14/18 2002   TROPONIN T ng/mL <0.010       Results from last 7 days  Lab Units 09/15/18  0434   WBC 10*3/mm3 8.63   HEMOGLOBIN g/dL 12.2*   HEMATOCRIT % 39.6*   PLATELETS 10*3/mm3 333       Results from last 7 days  Lab Units 09/15/18  1016 09/14/18 2002   INR  1.18* 1.22*       Results from last 7 days  Lab Units 09/15/18  0434   MAGNESIUM mg/dL 2.4                    EKG:            I personally viewed and interpreted the patient's EKG/Telemetry data.      Current Facility-Administered Medications:   •  acetaminophen (TYLENOL) 160 MG/5ML solution 650 mg, 650 mg, Per G Tube, Q4H PRN, Rbo Rabago MD  •  acetaminophen (TYLENOL) tablet 650 mg, 650 mg,  Oral, Q4H PRN, Navneet Yates MD  •  albuterol (PROVENTIL) nebulizer solution 0.083% 2.5 mg/3mL, 2.5 mg, Nebulization, BID, Rob Rabago MD  •  albuterol (PROVENTIL) nebulizer solution 0.5% 2.5 mg/0.5mL, 2.5 mg, Nebulization, Q4H PRN, Navneet Yates MD  •  atorvastatin (LIPITOR) tablet 40 mg, 40 mg, Per G Tube, Daily, Navneet Yates MD, 40 mg at 09/15/18 0942  •  bisacodyl (DULCOLAX) EC tablet 5 mg, 5 mg, Oral, Daily PRN, Navneet Yates MD  •  bisacodyl (DULCOLAX) suppository 10 mg, 10 mg, Rectal, Daily PRN, Navneet Yates MD  •  budesonide (PULMICORT) nebulizer solution 0.5 mg, 0.5 mg, Nebulization, BID - RT, Navneet Yates MD, 0.5 mg at 09/15/18 0808  •  dextrose (D50W) 25 g/ 50mL Intravenous Solution 25 g, 25 g, Intravenous, Q15 Min PRN, Navneet Yates MD  •  dextrose (GLUTOSE) oral gel 15 g, 15 g, Oral, Q15 Min PRN, Navneet Yates MD  •  finasteride (PROSCAR) tablet 5 mg, 5 mg, Oral, Daily, Navneet Yates MD, 5 mg at 09/15/18 0939  •  glucagon (human recombinant) (GLUCAGEN DIAGNOSTIC) injection 1 mg, 1 mg, Subcutaneous, PRN, Navneet Yates MD  •  HYDROcodone-acetaminophen (NORCO) 5-325 MG per tablet 1 tablet, 1 tablet, Oral, Q6H PRN, Navneet Yates MD  •  insulin aspart (novoLOG) injection 0-9 Units, 0-9 Units, Subcutaneous, 4x Daily With Meals & Nightly, Navneet Yates MD  •  ipratropium-albuterol (DUO-NEB) nebulizer solution 3 mL, 3 mL, Nebulization, 4x Daily - RT, Rob Rabago MD, 3 mL at 09/15/18 1142  •  lacosamide (VIMPAT) tablet 100 mg, 100 mg, Per G Tube, Q12H, Navneet Yates MD, 100 mg at 09/15/18 0939  •  levETIRAcetam (KEPPRA) 100 MG/ML solution 1,500 mg, 1,500 mg, Per G Tube, Q12H, Rob Rabago MD, 1,500 mg at 09/15/18 1001  •  levothyroxine (SYNTHROID, LEVOTHROID) tablet 75 mcg, 75 mcg, Per G Tube, Q AM, Navneet Yates MD, 75 mcg at 09/15/18 0612  •  midodrine (PROAMATINE) tablet 10 mg, 10 mg,  Per G Tube, TID, Navneet Yates MD, 10 mg at 09/15/18 0940  •  mupirocin (BACTROBAN) 2 % nasal ointment, , Each Nare, BID, Rob Rabago MD  •  ondansetron (ZOFRAN) tablet 4 mg, 4 mg, Oral, Q6H PRN **OR** ondansetron ODT (ZOFRAN-ODT) disintegrating tablet 4 mg, 4 mg, Oral, Q6H PRN **OR** ondansetron (ZOFRAN) injection 4 mg, 4 mg, Intravenous, Q6H PRN, Navneet Yates MD  •  ondansetron (ZOFRAN) tablet 4 mg, 4 mg, Oral, Q6H PRN **OR** ondansetron ODT (ZOFRAN-ODT) disintegrating tablet 4 mg, 4 mg, Oral, Q6H PRN **OR** ondansetron (ZOFRAN) injection 4 mg, 4 mg, Intravenous, Q6H PRN, Rob Rabago MD  •  sodium chloride 0.9 % flush 1-10 mL, 1-10 mL, Intravenous, PRN, Navneet Yates MD  •  sodium chloride 0.9 % flush 1-10 mL, 1-10 mL, Intravenous, PRN, Rob Rabago MD  •  Insert peripheral IV, , , Once **AND** sodium chloride 0.9 % flush 10 mL, 10 mL, Intravenous, PRN, Oswaldo Schmidt MD  •  sodium polystyrene (KAYEXALATE) 15 GM/60ML suspension 15 g, 15 g, Per G Tube, Once, Rob Rabago MD    Assessment and Plan:       Active Hospital Problems    Diagnosis Date Noted   • **Closed compression fracture of L1 lumbar vertebra (CMS/HCC) [S32.010A] 09/14/2018   • Hyperkalemia [E87.5] 09/15/2018   • Tracheostomy status (CMS/HCC) [Z93.0] 09/15/2018   • Seizures (CMS/HCC) [R56.9] 09/15/2018   • PAF (paroxysmal atrial fibrillation) (CMS/[facility]) [I48.0] 09/15/2018   • Neuromuscular dysfunction of bladder [N31.9] 09/15/2018   • Hypothyroidism [E03.9] 09/15/2018   • Gastrostomy tube dependent (CMS/[facility]) [Z93.1] 09/15/2018   • COPD (chronic obstructive pulmonary disease) (CMS/[facility]) [J44.9] 09/15/2018   • CAD (coronary artery disease) [I25.10] 09/15/2018   • AV block, 1st degree [I44.0] 09/15/2018   • Bifascicular block [I45.2] 09/15/2018   • Macrocytic anemia [D53.9] 09/15/2018   • Diabetes type 2, controlled (CMS/[facility]) [E11.9] 09/15/2018   • History of Clostridium difficile colitis [Z86.19]  09/15/2018   • Anticoagulated [Z79.01] 09/15/2018      Resolved Hospital Problems    Diagnosis Date Noted Date Resolved   No resolved problems to display.     1.  Preoperative cardiac assessment-his estimated rate of myocardial infarction, pulmonary edema, ventricular fibrillation, cardiac arrest, or complete heart block 0.9% (Alex).  He does not meet guideline recommendations for any additional cardiac testing at this time.  2.  Permanent atrial fibrillation-we will continue to follow  3.  CAD-no evidence of ACS  4.  Diabetes mellitus with circulatory complication-continue risk factor modification  5.  Chronic anticoagulation    Kam Pedraza III, MD  09/15/18  12:51 PM

## 2018-09-15 NOTE — CONSULTS
"Adult Nutrition  Assessment/PES    Patient Name:  Lam Viramontes  YOB: 1930  MRN: [id number]  Admit Date:  9/14/2018    Assessment Date:  9/15/2018    Comments:  Nutrition assessment completed. Spoke with RN and wife. Per Home regimen, recommend Osmolite 1.5  Bolus 1 carton x 4/day with 70cc water before and after each bolus.          Reason for Assessment     Row Name 09/15/18 1051          Reason for Assessment    Reason For Assessment TF/PN;physician consult     Diagnosis --   l1 LUMBAR VERTBRA, CAD, DM, DYSPHAGIA WITH GTUBE             Nutrition/Diet History     Row Name 09/15/18 1100          Nutrition/Diet History    Typical Food/Fluid Intake per wife, home feeding regimen is IsoSource 1.5 1 can x 4 bolus feeds with 70cc water befor and after             Anthropometrics     Row Name 09/15/18 1054          Anthropometrics    Height 175.3 cm (69.02\")     Weight 64.4 kg (142 lb)        Ideal Body Weight (IBW)    Ideal Body Weight (IBW) (kg) 73.73     % Ideal Body Weight 87.36        Body Mass Index (BMI)    BMI (kg/m2) 21     BMI Assessment BMI 18.5-24.9: normal        IBW Adjustment, Para/Tetraplegia    5% Adjustment, Para (IBW) 70.04     10% Adjustment, Para (IBW) 66.36     10% Adjustment, Tetra (IBW) 66.36     15% Adjustment, Tetra (IBW) 62.67             Labs/Tests/Procedures/Meds     Row Name 09/15/18 1054          Labs/Procedures/Meds    Lab Results Reviewed reviewed     Lab Results Comments Na, K, BUN, H/H        Diagnostic Tests/Procedures    Diagnostic Test/Procedure Reviewed reviewed        Medications    Pertinent Medications Reviewed reviewed     Pertinent Medications Comments LIPITOR, INSULIN, KEPPRA             Physical Findings     Row Name 09/15/18 1058          Physical Findings    Tubes gastrostomy tube     Skin pressure injury   BILATERAL COCCYX             Estimated/Assessed Needs     Row Name 09/15/18 1059 09/15/18 1054       Calculation Measurements    Weight Used For " "Calculations 64.4 kg (141 lb 15.6 oz)  --    Height  -- 175.3 cm (69.02\")       Estimated/Assessed Needs    Additional Documentation --   1932kcals, 64-77gm pro, 1932 cc fluid  --       KCAL/KG    14 Kcal/Kg (kcal) 901.6  --    15 Kcal/Kg (kcal) 966  --    18 Kcal/Kg (kcal) 1159.2  --    20 Kcal/Kg (kcal) 1288  --    25 Kcal/Kg (kcal) 1610  --    30 Kcal/Kg (kcal) 1932  --    35 Kcal/Kg (kcal) 2254  --    40 Kcal/Kg (kcal) 2576  --    45 Kcal/Kg (kcal) 2898  --    50 Kcal/Kg (kcal) 3220  --       Willernie-St. Jeor Equation    RMR (Willernie-St. Jeor Equation) 1304.63  --       Fluid Requirements    Susana-Mundo Method (over 20 kg) 2788  --            Nutrition Prescription Ordered     Row Name 09/15/18 1100          Nutrition Prescription PO    Current PO Diet NPO        Nutrition Prescription EN    Enteral Route Gastrostomy             Evaluation of Received Nutrient/Fluid Intake     Row Name 09/15/18 1059 09/15/18 1054       Calculation Measurements    Weight Used For Calculations 64.4 kg (141 lb 15.6 oz)  --    Height  -- 175.3 cm (69.02\")            Evaluation of Prescribed Nutrient/Fluid Intake     Row Name 09/15/18 1059 09/15/18 1054       Calculation Measurements    Weight Used For Calculations 64.4 kg (141 lb 15.6 oz)  --    Height  -- 175.3 cm (69.02\")          Problem/Interventions:        Problem 1     Row Name 09/15/18 1103          Nutrition Diagnoses Problem 1    Problem 1 Needs Alternate Route     Etiology (related to) Medical Diagnosis     Neurological Dysphagia     Signs/Symptoms (evidenced by) NPO                     Intervention Goal     Row Name 09/15/18 1104          Intervention Goal    General Maintain nutrition     TF/PN Inititiate TF/PN;Tolerate TF at goal             Nutrition Intervention     Row Name 09/15/18 1104          Nutrition Intervention    RD/Tech Action Follow Tx progress;Care plan reviewd;Recommend/ordered     Recommended/Ordered EN             Nutrition Prescription     Row " Name 09/15/18 1104          Nutrition Prescription EN    Enteral Prescription Enteral begin/change;Enteral to supply     Enteral Route Gastrostomy     Product Osmolite 1.5 stefan     TF Delivery Method Bolus   per home regimen     TF Bolus Goal Volume (mL) --   1 carton     TF Bolus Frequency 4 times a day     Water flush (mL)  70 mL     Water Flush Frequency Other (comment)   before and after each bolus             Education/Evaluation     Row Name 09/15/18 1106          Monitor/Evaluation    Monitor Per protocol         Electronically signed by:  Chely Quezada RD  09/15/18 11:07 AM

## 2018-09-15 NOTE — PROGRESS NOTES
Backup trachs at bedside.-Sana #4/#6-cuffed - per the policy.Per nurse report - patient does not want an inner cannula inserted in existing trach.

## 2018-09-16 ENCOUNTER — APPOINTMENT (OUTPATIENT)
Dept: GENERAL RADIOLOGY | Facility: HOSPITAL | Age: 83
End: 2018-09-16

## 2018-09-16 LAB
ANION GAP SERPL CALCULATED.3IONS-SCNC: 9.4 MMOL/L
APTT PPP: 30.1 SECONDS (ref 22.7–35.4)
BASOPHILS # BLD AUTO: 0.01 10*3/MM3 (ref 0–0.2)
BASOPHILS NFR BLD AUTO: 0.2 % (ref 0–1.5)
BUN BLD-MCNC: 30 MG/DL (ref 8–23)
BUN/CREAT SERPL: 36.6 (ref 7–25)
CALCIUM SPEC-SCNC: 9.2 MG/DL (ref 8.6–10.5)
CHLORIDE SERPL-SCNC: 101 MMOL/L (ref 98–107)
CO2 SERPL-SCNC: 28.6 MMOL/L (ref 22–29)
CREAT BLD-MCNC: 0.82 MG/DL (ref 0.76–1.27)
DEPRECATED RDW RBC AUTO: 51.3 FL (ref 37–54)
EOSINOPHIL # BLD AUTO: 0.17 10*3/MM3 (ref 0–0.7)
EOSINOPHIL NFR BLD AUTO: 2.6 % (ref 0.3–6.2)
ERYTHROCYTE [DISTWIDTH] IN BLOOD BY AUTOMATED COUNT: 13.2 % (ref 11.5–14.5)
FOLATE SERPL-MCNC: >20 NG/ML (ref 4.78–24.2)
GFR SERPL CREATININE-BSD FRML MDRD: 89 ML/MIN/1.73
GLUCOSE BLD-MCNC: 89 MG/DL (ref 65–99)
GLUCOSE BLDC GLUCOMTR-MCNC: 110 MG/DL (ref 70–130)
GLUCOSE BLDC GLUCOMTR-MCNC: 110 MG/DL (ref 70–130)
GLUCOSE BLDC GLUCOMTR-MCNC: 112 MG/DL (ref 70–130)
GLUCOSE BLDC GLUCOMTR-MCNC: 94 MG/DL (ref 70–130)
HCT VFR BLD AUTO: 37.5 % (ref 40.4–52.2)
HGB BLD-MCNC: 11.4 G/DL (ref 13.7–17.6)
IMM GRANULOCYTES # BLD: 0 10*3/MM3 (ref 0–0.03)
IMM GRANULOCYTES NFR BLD: 0 % (ref 0–0.5)
LYMPHOCYTES # BLD AUTO: 1.62 10*3/MM3 (ref 0.9–4.8)
LYMPHOCYTES NFR BLD AUTO: 25.1 % (ref 19.6–45.3)
MAGNESIUM SERPL-MCNC: 2.3 MG/DL (ref 1.6–2.4)
MCH RBC QN AUTO: 32.4 PG (ref 27–32.7)
MCHC RBC AUTO-ENTMCNC: 30.4 G/DL (ref 32.6–36.4)
MCV RBC AUTO: 106.5 FL (ref 79.8–96.2)
MONOCYTES # BLD AUTO: 0.67 10*3/MM3 (ref 0.2–1.2)
MONOCYTES NFR BLD AUTO: 10.4 % (ref 5–12)
NEUTROPHILS # BLD AUTO: 3.98 10*3/MM3 (ref 1.9–8.1)
NEUTROPHILS NFR BLD AUTO: 61.7 % (ref 42.7–76)
PHOSPHATE SERPL-MCNC: 3.5 MG/DL (ref 2.5–4.5)
PLATELET # BLD AUTO: 304 10*3/MM3 (ref 140–500)
PMV BLD AUTO: 10.1 FL (ref 6–12)
POTASSIUM BLD-SCNC: 4.2 MMOL/L (ref 3.5–5.2)
RBC # BLD AUTO: 3.52 10*6/MM3 (ref 4.6–6)
SODIUM BLD-SCNC: 139 MMOL/L (ref 136–145)
TSH SERPL DL<=0.05 MIU/L-ACNC: 2.43 MIU/ML (ref 0.27–4.2)
VIT B12 BLD-MCNC: 543 PG/ML (ref 211–946)
WBC NRBC COR # BLD: 6.45 10*3/MM3 (ref 4.5–10.7)

## 2018-09-16 PROCEDURE — 84100 ASSAY OF PHOSPHORUS: CPT | Performed by: HOSPITALIST

## 2018-09-16 PROCEDURE — 87070 CULTURE OTHR SPECIMN AEROBIC: CPT | Performed by: INTERNAL MEDICINE

## 2018-09-16 PROCEDURE — 80048 BASIC METABOLIC PNL TOTAL CA: CPT | Performed by: HOSPITALIST

## 2018-09-16 PROCEDURE — 94799 UNLISTED PULMONARY SVC/PX: CPT

## 2018-09-16 PROCEDURE — 83735 ASSAY OF MAGNESIUM: CPT | Performed by: HOSPITALIST

## 2018-09-16 PROCEDURE — 87186 SC STD MICRODIL/AGAR DIL: CPT | Performed by: INTERNAL MEDICINE

## 2018-09-16 PROCEDURE — 82746 ASSAY OF FOLIC ACID SERUM: CPT | Performed by: HOSPITALIST

## 2018-09-16 PROCEDURE — 74018 RADEX ABDOMEN 1 VIEW: CPT

## 2018-09-16 PROCEDURE — 87077 CULTURE AEROBIC IDENTIFY: CPT | Performed by: INTERNAL MEDICINE

## 2018-09-16 PROCEDURE — 99213 OFFICE O/P EST LOW 20 MIN: CPT | Performed by: INTERNAL MEDICINE

## 2018-09-16 PROCEDURE — 84443 ASSAY THYROID STIM HORMONE: CPT | Performed by: HOSPITALIST

## 2018-09-16 PROCEDURE — 82962 GLUCOSE BLOOD TEST: CPT

## 2018-09-16 PROCEDURE — 99232 SBSQ HOSP IP/OBS MODERATE 35: CPT | Performed by: NEUROLOGICAL SURGERY

## 2018-09-16 PROCEDURE — 85025 COMPLETE CBC W/AUTO DIFF WBC: CPT | Performed by: HOSPITALIST

## 2018-09-16 PROCEDURE — 82607 VITAMIN B-12: CPT | Performed by: HOSPITALIST

## 2018-09-16 PROCEDURE — 0 DIATRIZOATE MEGLUMINE & SODIUM PER 1 ML: Performed by: HOSPITALIST

## 2018-09-16 PROCEDURE — 85730 THROMBOPLASTIN TIME PARTIAL: CPT | Performed by: NEUROLOGICAL SURGERY

## 2018-09-16 PROCEDURE — 87205 SMEAR GRAM STAIN: CPT | Performed by: INTERNAL MEDICINE

## 2018-09-16 RX ADMIN — MIDODRINE HYDROCHLORIDE 10 MG: 5 TABLET ORAL at 21:55

## 2018-09-16 RX ADMIN — MUPIROCIN: 20 OINTMENT TOPICAL at 21:55

## 2018-09-16 RX ADMIN — LEVETIRACETAM 1500 MG: 100 SOLUTION ORAL at 09:15

## 2018-09-16 RX ADMIN — IPRATROPIUM BROMIDE AND ALBUTEROL SULFATE 3 ML: .5; 3 SOLUTION RESPIRATORY (INHALATION) at 20:50

## 2018-09-16 RX ADMIN — BUDESONIDE 0.5 MG: 0.5 INHALANT RESPIRATORY (INHALATION) at 20:50

## 2018-09-16 RX ADMIN — DIATRIZOATE MEGLUMINE AND DIATRIZOATE SODIUM 30 ML: 600; 100 SOLUTION ORAL; RECTAL at 20:30

## 2018-09-16 RX ADMIN — LEVETIRACETAM 1500 MG: 100 SOLUTION ORAL at 21:55

## 2018-09-16 RX ADMIN — FINASTERIDE 5 MG: 5 TABLET, FILM COATED ORAL at 09:15

## 2018-09-16 RX ADMIN — ATORVASTATIN CALCIUM 40 MG: 20 TABLET, FILM COATED ORAL at 09:15

## 2018-09-16 RX ADMIN — IPRATROPIUM BROMIDE AND ALBUTEROL SULFATE 3 ML: .5; 3 SOLUTION RESPIRATORY (INHALATION) at 11:51

## 2018-09-16 RX ADMIN — MUPIROCIN 10 APPLICATION: 20 OINTMENT TOPICAL at 09:15

## 2018-09-16 RX ADMIN — MIDODRINE HYDROCHLORIDE 10 MG: 5 TABLET ORAL at 09:15

## 2018-09-16 RX ADMIN — ALBUTEROL SULFATE 2.5 MG: 2.5 SOLUTION RESPIRATORY (INHALATION) at 07:28

## 2018-09-16 RX ADMIN — LACOSAMIDE 100 MG: 100 TABLET, FILM COATED ORAL at 21:55

## 2018-09-16 RX ADMIN — LACOSAMIDE 100 MG: 100 TABLET, FILM COATED ORAL at 09:15

## 2018-09-16 RX ADMIN — IPRATROPIUM BROMIDE AND ALBUTEROL SULFATE 3 ML: .5; 3 SOLUTION RESPIRATORY (INHALATION) at 16:39

## 2018-09-16 RX ADMIN — BUDESONIDE 0.5 MG: 0.5 INHALANT RESPIRATORY (INHALATION) at 07:28

## 2018-09-16 RX ADMIN — LEVOTHYROXINE SODIUM 75 MCG: 75 TABLET ORAL at 06:19

## 2018-09-16 RX ADMIN — BISACODYL 10 MG: 10 SUPPOSITORY RECTAL at 21:44

## 2018-09-16 NOTE — PROGRESS NOTES
[facility]     LOS: 0 days     Name: Lam Viramontes  Age: 88 y.o.  Sex: male  :  1930  MRN: [id number]         Primary Care Physician: Suman Jaimes III, MD    NPO Diet    Subjective   back is sore. no chest pain or shortness of breath. GT sliding per RN. external flange/ring looks to have slid away from abdomen. gently pulled on tube until met resistance and slid flange/ring to abdominal wall. slid very easily so wonder if that was the problem. wife states left lateral swelling is a known problem and she wasn't sure exactly what. Greater than 50% of time spent in counseling/coordination of care. Total face/floor time 35 minutes.    Objective   Temp:  [97.7 °F (36.5 °C)-98.3 °F (36.8 °C)] 97.7 °F (36.5 °C)  Heart Rate:  [52-92] 52  Resp:  [18-22] 18  BP: (121-147)/(67-78) 133/67  SpO2:  [96 %-98 %] 98 %  on  Flow (L/min):  [6] 6;   Device (Oxygen Therapy): tracheostomy collar  Body mass index is 23.1 kg/m².    Physical Exam   Constitutional: He is oriented to person, place, and time. No distress.   Neck:   tracheostomy   Cardiovascular: Normal rate and regular rhythm.    Pulmonary/Chest: Effort normal and breath sounds normal. No respiratory distress.   Abdominal: Soft. Bowel sounds are decreased. There is no tenderness. There is no rebound and no guarding.   PEG as above. left sided distension soft, no erythema or tenderness.   Musculoskeletal: He exhibits no edema.   Neurological: He is alert and oriented to person, place, and time.   Skin: Skin is warm and dry.   Psychiatric: He has a normal mood and affect. His behavior is normal.     Reviewed medications and new clinical results    albuterol 2.5 mg Nebulization BID   atorvastatin 40 mg Per G Tube Daily   budesonide 0.5 mg Nebulization BID - RT   finasteride 5 mg Oral Daily   insulin aspart 0-9 Units Subcutaneous 4x Daily With Meals & Nightly   ipratropium-albuterol 3 mL Nebulization 4x Daily -  RT   lacosamide 100 mg Per G Tube Q12H   levETIRAcetam 1,500 mg Per G Tube Q12H   levothyroxine 75 mcg Per G Tube Q AM   midodrine 10 mg Per G Tube TID   mupirocin  Each Nare BID        Results from last 7 days  Lab Units 09/16/18  0542 09/15/18  0434 09/14/18 2002   WBC 10*3/mm3 6.45 8.63 8.05   HEMOGLOBIN g/dL 11.4* 12.2* 12.5*   PLATELETS 10*3/mm3 304 333 382       Results from last 7 days  Lab Units 09/16/18  0542 09/15/18  0434 09/14/18 2119 09/14/18 2002   SODIUM mmol/L 139 133* 135* 131*   POTASSIUM mmol/L 4.2 5.5* 5.5* 5.9*   CHLORIDE mmol/L 101 97* 97* 93*   CO2 mmol/L 28.6 25.9 28.8 28.1   BUN mg/dL 30* 38* 39* 43*   CREATININE mg/dL 0.82 0.86 0.84 0.96   CALCIUM mg/dL 9.2 9.2 9.2 9.9   GLUCOSE mg/dL 89 97 89 94     Lab Results   Component Value Date    ANIONGAP 9.4 09/16/2018     Glucose   Date/Time Value Ref Range Status   09/16/2018 1132 112 70 - 130 mg/dL Final   09/16/2018 0622 94 70 - 130 mg/dL Final   09/15/2018 2130 118 70 - 130 mg/dL Final   09/15/2018 1637 131 (H) 70 - 130 mg/dL Final   09/15/2018 1134 96 70 - 130 mg/dL Final   09/15/2018 0715 86 70 - 130 mg/dL Final     Hemoglobin A1C   Date Value Ref Range Status   09/15/2018 6.50 (H) 4.80 - 5.60 % Final     Estimated Creatinine Clearance: 62.5 mL/min (by C-G formula based on SCr of 0.82 mg/dL).    Assessment/Plan   Active Hospital Problems    Diagnosis Date Noted   • **Closed compression fracture of L1 lumbar vertebra (CMS/[facility]) [S32.010A] 09/14/2018   • Hyperkalemia [E87.5] 09/15/2018   • Tracheostomy status (CMS/[facility]) [Z93.0] 09/15/2018   • Seizures (CMS/[facility]) [R56.9] 09/15/2018   • PAF (paroxysmal atrial fibrillation) (CMS/[facility]) [I48.0] 09/15/2018   • Neuromuscular dysfunction of bladder [N31.9] 09/15/2018   • Hypothyroidism [E03.9] 09/15/2018   • Gastrostomy tube dependent (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     · L1 VCF  · surgery this week  · CAD  · afib  · eliquis held  · hyperkalemia  · resolved  · DM 2, a1c 6.50  · controlled  · trach/PEG  · trach per pulmonology, he will continue follow-up with ENT outpatient  · check abd XR  · NGB  · clarke chronic  · COPD  · Hypothyroidism  · Seizure  · disposition  · TBD  · I discussed the patient's findings and my recommendations with patient, family and nursing staff and Dr. Deng Craven MD   09/16/18  2:49 PM

## 2018-09-16 NOTE — PLAN OF CARE
Problem: Patient Care Overview  Goal: Plan of Care Review  Outcome: Ongoing (interventions implemented as appropriate)   09/16/18 7075   Coping/Psychosocial   Plan of Care Reviewed With patient   Plan of Care Review   Progress improving   OTHER   Outcome Summary Frequent request for suctioning. Education provided about trauma with too much suctioning back to back. No other changes. VSS.

## 2018-09-16 NOTE — PLAN OF CARE
Problem: Fall Risk (Adult)  Goal: Absence of Fall  Outcome: Ongoing (interventions implemented as appropriate)      Problem: Skin Injury Risk (Adult)  Goal: Skin Health and Integrity  Outcome: Ongoing (interventions implemented as appropriate)      Problem: Airway, Artificial (Adult)  Goal: Signs and Symptoms of Listed Potential Problems Will be Absent, Minimized or Managed (Airway, Artificial)  Outcome: Ongoing (interventions implemented as appropriate)      Problem: Nutrition, Enteral (Adult)  Goal: Signs and Symptoms of Listed Potential Problems Will be Absent, Minimized or Managed (Nutrition, Enteral)  Outcome: Ongoing (interventions implemented as appropriate)      Problem: Pain, Acute (Adult)  Goal: Acceptable Pain Control/Comfort Level  Outcome: Ongoing (interventions implemented as appropriate)      Problem: Seizure Disorder/Epilepsy (Adult)  Goal: Signs and Symptoms of Listed Potential Problems Will be Absent, Minimized or Managed (Seizure Disorder/Epilepsy)  Outcome: Ongoing (interventions implemented as appropriate)      Problem: Patient Care Overview  Goal: Plan of Care Review  Outcome: Ongoing (interventions implemented as appropriate)   09/16/18 0375   Coping/Psychosocial   Plan of Care Reviewed With patient   Plan of Care Review   Progress no change   OTHER   Outcome Summary Patient alert. Frequently request for suctioning and iinforms patient of the need to wait and educated on the truma that can be caused due to frequent suctioning. VSS. Will continue to monitoring.

## 2018-09-16 NOTE — PROGRESS NOTES
[facility]  Phone: [phone]  Handy Vilchis MD    Subjective:  LOS: 0    Appears cheerful. Coughing and expectorating normally through trach. No respiratory symptoms.    Objective   Vital Signs past 24hrs    Temp range: Temp (24hrs), Av °F (36.7 °C), Min:97.7 °F (36.5 °C), Max:98.3 °F (36.8 °C)    BP range: BP: (133-147)/(67-78) 133/67  Pulse range: Heart Rate:  [52-92] 52  Resp rate range: Resp:  [18-22] 18    Device (Oxygen Therapy): tracheostomy collarFlow (L/min):  [6] 6  Oxygen range:SpO2:  [96 %-98 %] 98 %      71 kg (156 lb 8.4 oz); Body mass index is 23.1 kg/m².    Intake/Output Summary (Last 24 hours) at 18 1505  Last data filed at 18 0900   Gross per 24 hour   Intake              300 ml   Output              950 ml   Net             -650 ml       Physical Exam   Cardiovascular: Normal rate and regular rhythm.    No murmur heard.  Pulmonary/Chest: He has decreased breath sounds. He has no wheezes. He has no rales.   Trach- #6 cuffed Shiley   Abdominal: Soft. Bowel sounds are normal. There is no tenderness.   PEG   Musculoskeletal: He exhibits no edema.   Neurological: He is alert.   Nursing note and vitals reviewed.    Results Review:    I have reviewed the laboratory and imaging data since the last note by LPC physician.  My annotations are noted in assessment and plan.    Medication Review:  I have reviewed the current MAR.  My annotations are noted in assessment and plan.       Plan   PCCM Problems  Chronic Tracheostomy with trach collar  Ex-smoker with possible underlying COPD  Chronic dysphagia with PEG feeding tube  Relevant Medical Diagnoses  Lumbar compression fracture pending intervention  PAF  Diabetes  Chronic hypotension on midodrine    Plan of Treatment  Keep with cuffed trach for now. Not plans for lumbar surgery later this week.    Continue nebs.    Will see again Tuesday.    Handy Vilchis MD  18  3:05 PM    Part of this note may be an electronic  transcription/translation of spoken language to printed text using the Dragon Dictation System.

## 2018-09-16 NOTE — PROGRESS NOTES
"    Patient Name: Lam Viramontes  :1930  88 y.o.      Patient Care Team:  Suman Jaimes III, MD as PCP - General (Internal Medicine)    Chief Complaint: Prop cards, AFib    Interval History: No CP/SOB       Objective   Vital Signs  Temp:  [97.7 °F (36.5 °C)-98.3 °F (36.8 °C)] 97.7 °F (36.5 °C)  Heart Rate:  [48-92] 87  Resp:  [18-22] 18  BP: (111-147)/(66-80) 133/67    Intake/Output Summary (Last 24 hours) at 18 0844  Last data filed at 18 0600   Gross per 24 hour   Intake              800 ml   Output              700 ml   Net              100 ml     Flowsheet Rows      First Filed Value   Admission Height  175 cm (68.9\") Documented at 2018   Admission Weight  68 kg (150 lb) Documented at 2018          Physical Exam:   General Appearance:    comfortable, in no acute distress   Lungs:     Clear to auscultation.  Normal respiratory effort and rate.      Heart:    irregular rhythm and normal rate, normal S1 and S2, no murmurs, gallops or rubs.     Chest Wall:    No abnormalities observed   Abdomen:     Soft, nontender, positive bowel sounds.     Extremities:   no cyanosis, clubbing or edema.  No marked joint deformities.  Adequate musculoskeletal strength.       Results Review:      Results from last 7 days  Lab Units 18  0542   SODIUM mmol/L 139   POTASSIUM mmol/L 4.2   CHLORIDE mmol/L 101   CO2 mmol/L 28.6   BUN mg/dL 30*   CREATININE mg/dL 0.82   GLUCOSE mg/dL 89   CALCIUM mg/dL 9.2       Results from last 7 days  Lab Units 18   TROPONIN T ng/mL <0.010       Results from last 7 days  Lab Units 18  0542   WBC 10*3/mm3 6.45   HEMOGLOBIN g/dL 11.4*   HEMATOCRIT % 37.5*   PLATELETS 10*3/mm3 304       Results from last 7 days  Lab Units 18  0542 09/15/18  1016 18   INR   --  1.18* 1.22*   APTT seconds 30.1  --   --        Results from last 7 days  Lab Units 18  0542   MAGNESIUM mg/dL 2.3                   Medication Review: "     albuterol 2.5 mg Nebulization BID   atorvastatin 40 mg Per G Tube Daily   budesonide 0.5 mg Nebulization BID - RT   finasteride 5 mg Oral Daily   insulin aspart 0-9 Units Subcutaneous 4x Daily With Meals & Nightly   ipratropium-albuterol 3 mL Nebulization 4x Daily - RT   lacosamide 100 mg Per G Tube Q12H   levETIRAcetam 1,500 mg Per G Tube Q12H   levothyroxine 75 mcg Per G Tube Q AM   midodrine 10 mg Per G Tube TID   mupirocin  Each Nare BID             Assessment/Plan   Active Hospital Problems    Diagnosis Date Noted   • **Closed compression fracture of L1 lumbar vertebra (CMS/[facility]) [S32.010A] 09/14/2018   • Hyperkalemia [E87.5] 09/15/2018   • Tracheostomy status (CMS/[facility]) [Z93.0] 09/15/2018   • Seizures (CMS/[facility]) [R56.9] 09/15/2018   • PAF (paroxysmal atrial fibrillation) (CMS/[facility]) [I48.0] 09/15/2018   • Neuromuscular dysfunction of bladder [N31.9] 09/15/2018   • Hypothyroidism [E03.9] 09/15/2018   • 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     1.  Closed compression fracture L1/Preoperative cardiac assessment-his estimated rate of myocardial infarction, pulmonary edema, ventricular fibrillation, cardiac arrest, or complete heart block 0.9% (Alex).  He does not meet guideline recommendations for any additional cardiac testing at this time. Surgery planned for next week  2.  Permanent atrial fibrillation-we will continue to follow  3.  CAD-no evidence of ACS  4.  Diabetes  mellitus with circulatory complication-continue risk factor modification  5.  Chronic anticoagulation       Kam Pedraza III, MD  [facility]  09/16/18  8:44 AM

## 2018-09-16 NOTE — PROGRESS NOTES
LOS: 0 days   Patient Care Team:  Suman Jaimes III, MD as PCP - General (Internal Medicine)    Chief Complaint: Back pain    Subjective     History of Present Illness  No new complaints   Subjective    History taken from: chart    Objective     Vital Signs  Temp:  [97.7 °F (36.5 °C)-98.3 °F (36.8 °C)] 97.7 °F (36.5 °C)  Heart Rate:  [48-92] 87  Resp:  [18-22] 18  BP: (111-147)/(66-78) 133/67    Objective  Logically stable with no focal motor deficits  Results Review:     I reviewed the patient's new clinical results.    Medication Review: Reviewed    Assessment/Plan   Patient unstable for surgery as a significant hypokalemia and cardiac clearance.  Cardiology evaluated the patient with no obvious cardiac contraindication to surgery.  The patient will L occlusal still need another 24 hours to allow the medication to clear.  We'll plan to proceed with posterior stabilization this week.  We will discuss with the oncoming neurosurgical team.  From an continue bedrest at this time and no mobilization due to instability of the fracture.  Principal Problem:    Closed compression fracture of L1 lumbar vertebra (CMS/HCC)  Active Problems:    Hyperkalemia    Tracheostomy status (CMS/HCC)    Seizures (CMS/HCC)    PAF (paroxysmal atrial fibrillation) (CMS/HCC)    Neuromuscular dysfunction of bladder    Hypothyroidism    Gastrostomy tube dependent (CMS/HCC)    COPD (chronic obstructive pulmonary disease) (CMS/HCC)    CAD (coronary artery disease)    AV block, 1st degree    Bifascicular block    Macrocytic anemia    Diabetes type 2, controlled (CMS/HCC)    History of Clostridium difficile colitis    Anticoagulated      Assessment & Plan    Jose Daniel Tyler MD  09/16/18  11:29 AM    T

## 2018-09-17 ENCOUNTER — ANESTHESIA (OUTPATIENT)
Dept: PERIOP | Facility: HOSPITAL | Age: 83
End: 2018-09-17

## 2018-09-17 ENCOUNTER — APPOINTMENT (OUTPATIENT)
Dept: GENERAL RADIOLOGY | Facility: HOSPITAL | Age: 83
End: 2018-09-17

## 2018-09-17 ENCOUNTER — ANESTHESIA EVENT (OUTPATIENT)
Dept: PERIOP | Facility: HOSPITAL | Age: 83
End: 2018-09-17

## 2018-09-17 LAB
BACTERIA SPEC AEROBE CULT: ABNORMAL
GLUCOSE BLDC GLUCOMTR-MCNC: 109 MG/DL (ref 70–130)
GLUCOSE BLDC GLUCOMTR-MCNC: 115 MG/DL (ref 70–130)
GLUCOSE BLDC GLUCOMTR-MCNC: 133 MG/DL (ref 70–130)
GLUCOSE BLDC GLUCOMTR-MCNC: 143 MG/DL (ref 70–130)

## 2018-09-17 PROCEDURE — 0SG00K1 FUSION OF LUMBAR VERTEBRAL JOINT WITH NONAUTOLOGOUS TISSUE SUBSTITUTE, POSTERIOR APPROACH, POSTERIOR COLUMN, OPEN APPROACH: ICD-10-PCS | Performed by: NEUROLOGICAL SURGERY

## 2018-09-17 PROCEDURE — 25010000002 NEOSTIGMINE PER 0.5 MG: Performed by: ANESTHESIOLOGY

## 2018-09-17 PROCEDURE — 82962 GLUCOSE BLOOD TEST: CPT

## 2018-09-17 PROCEDURE — C1713 ANCHOR/SCREW BN/BN,TIS/BN: HCPCS | Performed by: NEUROLOGICAL SURGERY

## 2018-09-17 PROCEDURE — 22610 ARTHRD PST TQ 1NTRSPC THRC: CPT | Performed by: NEUROLOGICAL SURGERY

## 2018-09-17 PROCEDURE — 72110 X-RAY EXAM L-2 SPINE 4/>VWS: CPT

## 2018-09-17 PROCEDURE — 25010000002 ONDANSETRON PER 1 MG: Performed by: NURSE ANESTHETIST, CERTIFIED REGISTERED

## 2018-09-17 PROCEDURE — 25010000002 FENTANYL CITRATE (PF) 100 MCG/2ML SOLUTION: Performed by: NURSE ANESTHETIST, CERTIFIED REGISTERED

## 2018-09-17 PROCEDURE — 25010000002 DEXAMETHASONE PER 1 MG: Performed by: NURSE ANESTHETIST, CERTIFIED REGISTERED

## 2018-09-17 PROCEDURE — 99232 SBSQ HOSP IP/OBS MODERATE 35: CPT | Performed by: INTERNAL MEDICINE

## 2018-09-17 PROCEDURE — 0RGA0K1 FUSION OF THORACOLUMBAR VERTEBRAL JOINT WITH NONAUTOLOGOUS TISSUE SUBSTITUTE, POSTERIOR APPROACH, POSTERIOR COLUMN, OPEN APPROACH: ICD-10-PCS | Performed by: NEUROLOGICAL SURGERY

## 2018-09-17 PROCEDURE — 25010000002 PHENYLEPHRINE PER 1 ML: Performed by: NURSE ANESTHETIST, CERTIFIED REGISTERED

## 2018-09-17 PROCEDURE — 94799 UNLISTED PULMONARY SVC/PX: CPT

## 2018-09-17 PROCEDURE — 25010000002 PROPOFOL 10 MG/ML EMULSION: Performed by: NURSE ANESTHETIST, CERTIFIED REGISTERED

## 2018-09-17 PROCEDURE — 22842 INSERT SPINE FIXATION DEVICE: CPT | Performed by: NEUROLOGICAL SURGERY

## 2018-09-17 PROCEDURE — 22614 ARTHRD PST TQ 1NTRSPC EA ADD: CPT | Performed by: NEUROLOGICAL SURGERY

## 2018-09-17 PROCEDURE — 25010000002 VANCOMYCIN PER 500 MG: Performed by: NEUROLOGICAL SURGERY

## 2018-09-17 DEVICE — DBM 7509145 MAGNIFUSE 1.75 X 5 CM
Type: IMPLANTABLE DEVICE | Site: SPINE LUMBAR | Status: FUNCTIONAL
Brand: MAGNIFUSE® BONE GRAFT

## 2018-09-17 DEVICE — SCREW 54840046550 4.75 ATS MAS 6.5X50
Type: IMPLANTABLE DEVICE | Site: SPINE LUMBAR | Status: FUNCTIONAL
Brand: CD HORIZON® SPINAL SYSTEM

## 2018-09-17 RX ORDER — KETOROLAC TROMETHAMINE 30 MG/ML
15 INJECTION, SOLUTION INTRAMUSCULAR; INTRAVENOUS EVERY 6 HOURS PRN
Status: ACTIVE | OUTPATIENT
Start: 2018-09-17 | End: 2018-09-18

## 2018-09-17 RX ORDER — GLYCOPYRROLATE 0.2 MG/ML
INJECTION INTRAMUSCULAR; INTRAVENOUS AS NEEDED
Status: DISCONTINUED | OUTPATIENT
Start: 2018-09-17 | End: 2018-09-17 | Stop reason: SURG

## 2018-09-17 RX ORDER — LIDOCAINE HYDROCHLORIDE AND EPINEPHRINE 10; 10 MG/ML; UG/ML
INJECTION, SOLUTION INFILTRATION; PERINEURAL AS NEEDED
Status: DISCONTINUED | OUTPATIENT
Start: 2018-09-17 | End: 2018-09-17 | Stop reason: HOSPADM

## 2018-09-17 RX ORDER — MIDAZOLAM HYDROCHLORIDE 1 MG/ML
2 INJECTION INTRAMUSCULAR; INTRAVENOUS
Status: DISCONTINUED | OUTPATIENT
Start: 2018-09-17 | End: 2018-09-17 | Stop reason: HOSPADM

## 2018-09-17 RX ORDER — MIDAZOLAM HYDROCHLORIDE 1 MG/ML
1 INJECTION INTRAMUSCULAR; INTRAVENOUS
Status: DISCONTINUED | OUTPATIENT
Start: 2018-09-17 | End: 2018-09-17 | Stop reason: HOSPADM

## 2018-09-17 RX ORDER — VANCOMYCIN HYDROCHLORIDE 1 G/200ML
1000 INJECTION, SOLUTION INTRAVENOUS EVERY 12 HOURS
Status: COMPLETED | OUTPATIENT
Start: 2018-09-18 | End: 2018-09-18

## 2018-09-17 RX ORDER — PROMETHAZINE HYDROCHLORIDE 25 MG/ML
12.5 INJECTION, SOLUTION INTRAMUSCULAR; INTRAVENOUS ONCE AS NEEDED
Status: DISCONTINUED | OUTPATIENT
Start: 2018-09-17 | End: 2018-09-17

## 2018-09-17 RX ORDER — SODIUM CHLORIDE 0.9 % (FLUSH) 0.9 %
1-10 SYRINGE (ML) INJECTION AS NEEDED
Status: DISCONTINUED | OUTPATIENT
Start: 2018-09-17 | End: 2018-09-24 | Stop reason: HOSPADM

## 2018-09-17 RX ORDER — CLINDAMYCIN PHOSPHATE 900 MG/50ML
900 INJECTION INTRAVENOUS ONCE
Status: COMPLETED | OUTPATIENT
Start: 2018-09-17 | End: 2018-09-17

## 2018-09-17 RX ORDER — PROMETHAZINE HYDROCHLORIDE 25 MG/1
12.5 TABLET ORAL ONCE AS NEEDED
Status: DISCONTINUED | OUTPATIENT
Start: 2018-09-17 | End: 2018-09-17

## 2018-09-17 RX ORDER — UREA 10 %
3 LOTION (ML) TOPICAL NIGHTLY PRN
Status: DISCONTINUED | OUTPATIENT
Start: 2018-09-17 | End: 2018-09-24 | Stop reason: HOSPADM

## 2018-09-17 RX ORDER — PROPOFOL 10 MG/ML
VIAL (ML) INTRAVENOUS AS NEEDED
Status: DISCONTINUED | OUTPATIENT
Start: 2018-09-17 | End: 2018-09-17 | Stop reason: SURG

## 2018-09-17 RX ORDER — SODIUM CHLORIDE, SODIUM LACTATE, POTASSIUM CHLORIDE, CALCIUM CHLORIDE 600; 310; 30; 20 MG/100ML; MG/100ML; MG/100ML; MG/100ML
9 INJECTION, SOLUTION INTRAVENOUS CONTINUOUS
Status: DISCONTINUED | OUTPATIENT
Start: 2018-09-17 | End: 2018-09-24 | Stop reason: HOSPADM

## 2018-09-17 RX ORDER — EPHEDRINE SULFATE 50 MG/ML
5 INJECTION, SOLUTION INTRAVENOUS ONCE AS NEEDED
Status: DISCONTINUED | OUTPATIENT
Start: 2018-09-17 | End: 2018-09-17

## 2018-09-17 RX ORDER — FAMOTIDINE 10 MG/ML
20 INJECTION, SOLUTION INTRAVENOUS ONCE
Status: COMPLETED | OUTPATIENT
Start: 2018-09-17 | End: 2018-09-17

## 2018-09-17 RX ORDER — ALBUTEROL SULFATE 2.5 MG/3ML
2.5 SOLUTION RESPIRATORY (INHALATION) ONCE AS NEEDED
Status: DISCONTINUED | OUTPATIENT
Start: 2018-09-17 | End: 2018-09-17

## 2018-09-17 RX ORDER — ONDANSETRON 2 MG/ML
4 INJECTION INTRAMUSCULAR; INTRAVENOUS ONCE AS NEEDED
Status: DISCONTINUED | OUTPATIENT
Start: 2018-09-17 | End: 2018-09-17

## 2018-09-17 RX ORDER — DOCUSATE SODIUM 100 MG/1
100 CAPSULE, LIQUID FILLED ORAL 2 TIMES DAILY
Status: DISCONTINUED | OUTPATIENT
Start: 2018-09-17 | End: 2018-09-17

## 2018-09-17 RX ORDER — SODIUM CHLORIDE 0.9 % (FLUSH) 0.9 %
1-10 SYRINGE (ML) INJECTION AS NEEDED
Status: DISCONTINUED | OUTPATIENT
Start: 2018-09-17 | End: 2018-09-17 | Stop reason: HOSPADM

## 2018-09-17 RX ORDER — LABETALOL HYDROCHLORIDE 5 MG/ML
5 INJECTION, SOLUTION INTRAVENOUS
Status: DISCONTINUED | OUTPATIENT
Start: 2018-09-17 | End: 2018-09-17

## 2018-09-17 RX ORDER — HYDROCODONE BITARTRATE AND ACETAMINOPHEN 7.5; 325 MG/1; MG/1
1 TABLET ORAL ONCE AS NEEDED
Status: DISCONTINUED | OUTPATIENT
Start: 2018-09-17 | End: 2018-09-17

## 2018-09-17 RX ORDER — HYDROCODONE BITARTRATE AND ACETAMINOPHEN 7.5; 325 MG/1; MG/1
1 TABLET ORAL EVERY 4 HOURS PRN
Status: DISCONTINUED | OUTPATIENT
Start: 2018-09-17 | End: 2018-09-24 | Stop reason: HOSPADM

## 2018-09-17 RX ORDER — VANCOMYCIN HYDROCHLORIDE 1 G/200ML
1 INJECTION, SOLUTION INTRAVENOUS ONCE
Status: COMPLETED | OUTPATIENT
Start: 2018-09-17 | End: 2018-09-17

## 2018-09-17 RX ORDER — DOCUSATE SODIUM 50 MG/5 ML
100 LIQUID (ML) ORAL DAILY
Status: DISCONTINUED | OUTPATIENT
Start: 2018-09-17 | End: 2018-09-24 | Stop reason: HOSPADM

## 2018-09-17 RX ORDER — DIPHENHYDRAMINE HYDROCHLORIDE 50 MG/ML
12.5 INJECTION INTRAMUSCULAR; INTRAVENOUS
Status: DISCONTINUED | OUTPATIENT
Start: 2018-09-17 | End: 2018-09-17

## 2018-09-17 RX ORDER — LIDOCAINE HYDROCHLORIDE 20 MG/ML
INJECTION, SOLUTION INFILTRATION; PERINEURAL AS NEEDED
Status: DISCONTINUED | OUTPATIENT
Start: 2018-09-17 | End: 2018-09-17 | Stop reason: SURG

## 2018-09-17 RX ORDER — NALOXONE HCL 0.4 MG/ML
0.2 VIAL (ML) INJECTION AS NEEDED
Status: DISCONTINUED | OUTPATIENT
Start: 2018-09-17 | End: 2018-09-17

## 2018-09-17 RX ORDER — ONDANSETRON 2 MG/ML
4 INJECTION INTRAMUSCULAR; INTRAVENOUS EVERY 6 HOURS PRN
Status: DISCONTINUED | OUTPATIENT
Start: 2018-09-17 | End: 2018-09-24 | Stop reason: HOSPADM

## 2018-09-17 RX ORDER — NALOXONE HCL 0.4 MG/ML
0.4 VIAL (ML) INJECTION
Status: DISCONTINUED | OUTPATIENT
Start: 2018-09-17 | End: 2018-09-24 | Stop reason: HOSPADM

## 2018-09-17 RX ORDER — FENTANYL CITRATE 50 UG/ML
50 INJECTION, SOLUTION INTRAMUSCULAR; INTRAVENOUS
Status: DISCONTINUED | OUTPATIENT
Start: 2018-09-17 | End: 2018-09-17

## 2018-09-17 RX ORDER — ROCURONIUM BROMIDE 10 MG/ML
INJECTION, SOLUTION INTRAVENOUS AS NEEDED
Status: DISCONTINUED | OUTPATIENT
Start: 2018-09-17 | End: 2018-09-17 | Stop reason: SURG

## 2018-09-17 RX ORDER — FENTANYL CITRATE 50 UG/ML
INJECTION, SOLUTION INTRAMUSCULAR; INTRAVENOUS AS NEEDED
Status: DISCONTINUED | OUTPATIENT
Start: 2018-09-17 | End: 2018-09-17 | Stop reason: SURG

## 2018-09-17 RX ORDER — BISACODYL 10 MG
10 SUPPOSITORY, RECTAL RECTAL DAILY PRN
Status: DISCONTINUED | OUTPATIENT
Start: 2018-09-17 | End: 2018-09-24 | Stop reason: HOSPADM

## 2018-09-17 RX ORDER — FLUMAZENIL 0.1 MG/ML
0.2 INJECTION INTRAVENOUS AS NEEDED
Status: DISCONTINUED | OUTPATIENT
Start: 2018-09-17 | End: 2018-09-17

## 2018-09-17 RX ORDER — SODIUM CHLORIDE AND POTASSIUM CHLORIDE 150; 450 MG/100ML; MG/100ML
100 INJECTION, SOLUTION INTRAVENOUS CONTINUOUS
Status: DISCONTINUED | OUTPATIENT
Start: 2018-09-17 | End: 2018-09-21

## 2018-09-17 RX ORDER — OXYCODONE AND ACETAMINOPHEN 7.5; 325 MG/1; MG/1
1 TABLET ORAL ONCE AS NEEDED
Status: DISCONTINUED | OUTPATIENT
Start: 2018-09-17 | End: 2018-09-17

## 2018-09-17 RX ORDER — ONDANSETRON 2 MG/ML
INJECTION INTRAMUSCULAR; INTRAVENOUS AS NEEDED
Status: DISCONTINUED | OUTPATIENT
Start: 2018-09-17 | End: 2018-09-17 | Stop reason: SURG

## 2018-09-17 RX ORDER — FENTANYL CITRATE 50 UG/ML
50 INJECTION, SOLUTION INTRAMUSCULAR; INTRAVENOUS
Status: DISCONTINUED | OUTPATIENT
Start: 2018-09-17 | End: 2018-09-17 | Stop reason: HOSPADM

## 2018-09-17 RX ORDER — PROMETHAZINE HYDROCHLORIDE 25 MG/1
25 SUPPOSITORY RECTAL ONCE AS NEEDED
Status: DISCONTINUED | OUTPATIENT
Start: 2018-09-17 | End: 2018-09-17

## 2018-09-17 RX ORDER — DEXAMETHASONE SODIUM PHOSPHATE 10 MG/ML
INJECTION INTRAMUSCULAR; INTRAVENOUS AS NEEDED
Status: DISCONTINUED | OUTPATIENT
Start: 2018-09-17 | End: 2018-09-17 | Stop reason: SURG

## 2018-09-17 RX ORDER — PROMETHAZINE HYDROCHLORIDE 25 MG/1
25 TABLET ORAL ONCE AS NEEDED
Status: DISCONTINUED | OUTPATIENT
Start: 2018-09-17 | End: 2018-09-17

## 2018-09-17 RX ORDER — LIDOCAINE HYDROCHLORIDE 10 MG/ML
0.5 INJECTION, SOLUTION EPIDURAL; INFILTRATION; INTRACAUDAL; PERINEURAL ONCE AS NEEDED
Status: DISCONTINUED | OUTPATIENT
Start: 2018-09-17 | End: 2018-09-17 | Stop reason: HOSPADM

## 2018-09-17 RX ORDER — DOCUSATE SODIUM 100 MG/1
100 CAPSULE, LIQUID FILLED ORAL 2 TIMES DAILY PRN
Status: DISCONTINUED | OUTPATIENT
Start: 2018-09-17 | End: 2018-09-24 | Stop reason: HOSPADM

## 2018-09-17 RX ADMIN — PHENYLEPHRINE HYDROCHLORIDE 100 MCG: 10 INJECTION INTRAVENOUS at 17:06

## 2018-09-17 RX ADMIN — ROCURONIUM BROMIDE 25 MG: 10 INJECTION INTRAVENOUS at 15:59

## 2018-09-17 RX ADMIN — SODIUM CHLORIDE, POTASSIUM CHLORIDE, SODIUM LACTATE AND CALCIUM CHLORIDE 9 ML/HR: 600; 310; 30; 20 INJECTION, SOLUTION INTRAVENOUS at 15:40

## 2018-09-17 RX ADMIN — ROCURONIUM BROMIDE 10 MG: 10 INJECTION INTRAVENOUS at 16:59

## 2018-09-17 RX ADMIN — ATORVASTATIN CALCIUM 40 MG: 20 TABLET, FILM COATED ORAL at 09:27

## 2018-09-17 RX ADMIN — POLYETHYLENE GLYCOL 3350 17 G: 17 POWDER, FOR SOLUTION ORAL at 21:38

## 2018-09-17 RX ADMIN — BUDESONIDE 0.5 MG: 0.5 INHALANT RESPIRATORY (INHALATION) at 07:05

## 2018-09-17 RX ADMIN — IPRATROPIUM BROMIDE AND ALBUTEROL SULFATE 3 ML: .5; 3 SOLUTION RESPIRATORY (INHALATION) at 07:04

## 2018-09-17 RX ADMIN — LACOSAMIDE 100 MG: 100 TABLET, FILM COATED ORAL at 21:38

## 2018-09-17 RX ADMIN — PHENYLEPHRINE HYDROCHLORIDE 100 MCG: 10 INJECTION INTRAVENOUS at 17:21

## 2018-09-17 RX ADMIN — NEOSTIGMINE METHYLSULFATE 3 MG: 1 INJECTION INTRAMUSCULAR; INTRAVENOUS; SUBCUTANEOUS at 17:42

## 2018-09-17 RX ADMIN — PHENYLEPHRINE HYDROCHLORIDE 100 MCG: 10 INJECTION INTRAVENOUS at 16:31

## 2018-09-17 RX ADMIN — FENTANYL CITRATE 50 MCG: 50 INJECTION, SOLUTION INTRAMUSCULAR; INTRAVENOUS at 19:16

## 2018-09-17 RX ADMIN — MIDODRINE HYDROCHLORIDE 10 MG: 5 TABLET ORAL at 21:38

## 2018-09-17 RX ADMIN — FINASTERIDE 5 MG: 5 TABLET, FILM COATED ORAL at 09:27

## 2018-09-17 RX ADMIN — PHENYLEPHRINE HYDROCHLORIDE 100 MCG: 10 INJECTION INTRAVENOUS at 16:58

## 2018-09-17 RX ADMIN — PHENYLEPHRINE HYDROCHLORIDE 100 MCG: 10 INJECTION INTRAVENOUS at 16:11

## 2018-09-17 RX ADMIN — PHENYLEPHRINE HYDROCHLORIDE 100 MCG: 10 INJECTION INTRAVENOUS at 16:08

## 2018-09-17 RX ADMIN — GLYCOPYRROLATE 0.2 MG: 0.2 INJECTION INTRAMUSCULAR; INTRAVENOUS at 15:59

## 2018-09-17 RX ADMIN — LEVETIRACETAM 1500 MG: 100 SOLUTION ORAL at 09:27

## 2018-09-17 RX ADMIN — PROPOFOL 90 MG: 10 INJECTION, EMULSION INTRAVENOUS at 15:59

## 2018-09-17 RX ADMIN — PHENYLEPHRINE HYDROCHLORIDE 100 MCG: 10 INJECTION INTRAVENOUS at 16:14

## 2018-09-17 RX ADMIN — MUPIROCIN: 20 OINTMENT TOPICAL at 21:38

## 2018-09-17 RX ADMIN — CLINDAMYCIN PHOSPHATE 900 MG: 900 INJECTION INTRAVENOUS at 15:55

## 2018-09-17 RX ADMIN — PHENYLEPHRINE HYDROCHLORIDE 100 MCG: 10 INJECTION INTRAVENOUS at 16:42

## 2018-09-17 RX ADMIN — LIDOCAINE HYDROCHLORIDE 60 MG: 20 INJECTION, SOLUTION INFILTRATION; PERINEURAL at 15:59

## 2018-09-17 RX ADMIN — ONDANSETRON 4 MG: 2 INJECTION INTRAMUSCULAR; INTRAVENOUS at 17:24

## 2018-09-17 RX ADMIN — VANCOMYCIN HYDROCHLORIDE 1 G: 1 INJECTION, SOLUTION INTRAVENOUS at 15:40

## 2018-09-17 RX ADMIN — FENTANYL CITRATE 50 MCG: 50 INJECTION INTRAMUSCULAR; INTRAVENOUS at 17:31

## 2018-09-17 RX ADMIN — HYDROCODONE BITARTRATE AND ACETAMINOPHEN 1 TABLET: 7.5; 325 TABLET ORAL at 23:42

## 2018-09-17 RX ADMIN — MIDODRINE HYDROCHLORIDE 10 MG: 5 TABLET ORAL at 09:27

## 2018-09-17 RX ADMIN — LEVOTHYROXINE SODIUM 75 MCG: 75 TABLET ORAL at 05:57

## 2018-09-17 RX ADMIN — PHENYLEPHRINE HYDROCHLORIDE 100 MCG: 10 INJECTION INTRAVENOUS at 16:25

## 2018-09-17 RX ADMIN — FAMOTIDINE 20 MG: 10 INJECTION, SOLUTION INTRAVENOUS at 15:40

## 2018-09-17 RX ADMIN — LEVETIRACETAM 1500 MG: 100 SOLUTION ORAL at 21:38

## 2018-09-17 RX ADMIN — PHENYLEPHRINE HYDROCHLORIDE 100 MCG: 10 INJECTION INTRAVENOUS at 16:17

## 2018-09-17 RX ADMIN — GLYCOPYRROLATE 0.2 MG: 0.2 INJECTION INTRAMUSCULAR; INTRAVENOUS at 17:42

## 2018-09-17 RX ADMIN — LACOSAMIDE 100 MG: 100 TABLET, FILM COATED ORAL at 09:26

## 2018-09-17 RX ADMIN — PHENYLEPHRINE HYDROCHLORIDE 100 MCG: 10 INJECTION INTRAVENOUS at 16:16

## 2018-09-17 RX ADMIN — IPRATROPIUM BROMIDE AND ALBUTEROL SULFATE 3 ML: .5; 3 SOLUTION RESPIRATORY (INHALATION) at 11:19

## 2018-09-17 RX ADMIN — DEXAMETHASONE SODIUM PHOSPHATE 4 MG: 10 INJECTION INTRAMUSCULAR; INTRAVENOUS at 16:41

## 2018-09-17 NOTE — PLAN OF CARE
After reviewing the CT scan and the cardiology report we will still plan to proceed ahead with surgical intervention.  The patient states that his pain is quite severe and unfortunately I feel due to the amount of pain his immobility become a significant issue as was the fact that this is a 3 column injury and unstable.  We had discussed briefly bracing but in my opinion I feel this has a very high likelihood of failure possibly causing further neurologic impairment which could be irreversible.  He is at high risk for possible infection and complications but at this time I feel the only alternative we have is instrumented arthrodesis to allow more viable chance of recovery and maintain mobility he has at this time.  We did discuss at length the risk of the procedure being bleeding, death, paralysis, infection, CSF leak, failure the procedure and further procedures.  He indicates he understands and he wishes to proceed.    I spent 30 minutes with the patient in direct face-to-face time with greater than 50% of the time counseling and educating the patient and discussion of the surgical procedure, the pathology, and the treatment algorithm.

## 2018-09-17 NOTE — PROGRESS NOTES
[facility]  Cardiology Progress Note    Patient Identification:  Name: Lam Viramontes  Age: 88 y.o.  Sex: male  :  1930  MRN: [id number]                 Follow Up / Chief Complaint: Surgical clearance, history of A. fib, CAD, CABG, RBBB, diastolic dysfunction, hypertension    Interval History:   88 year old man with history of Afib (eliquis), CAD s/p CABG, diastolic dysfunction, h/o transient AV block's and hypertension currently admitted with L1 compression fracture.  Asked to see for clearance for spinal surgery     Subjective:  Awake, alert resting quietly.  Spouse at bedside.  Patient denies chest pain or shortness of breath    Objective:  SR 60's with 1st degree and BBB. No pauses or highh degree a-vblocks  's-140's / 70's  Afeb  Sat >95% on 6L trach collar        Past Medical History:  Past Medical History:   Diagnosis Date   • Ankylosing spondylitis lumbar region (CMS/HCC)    • BPH (benign prostatic hyperplasia)    • C. difficile diarrhea    • Constipation    • Coronary artery disease    • Diabetes mellitus (CMS/HCC)    • Dysphagia    • Hyperlipidemia    • Hypertension    • Hypothyroidism    • MRSA infection    • Neuromuscular dysfunction of bladder    • PAF (paroxysmal atrial fibrillation) (CMS/HCC)    • Pain    • Pressure ulcer of sacral region    • Seizures (CMS/HCC)    • Tracheostomy status (CMS/HCC)      Past Surgical History:  Past Surgical History:   Procedure Laterality Date   • CARDIAC SURGERY     • CORONARY ARTERY BYPASS GRAFT      x 5   • GTUBE INSERTION     • TRACHEOSTOMY          Social History:   Social History   Substance Use Topics   • Smoking status: Former Smoker   • Smokeless tobacco: Never Used   • Alcohol use No      Family History:  History reviewed. No pertinent family history.       Allergies:  No Known Allergies  Scheduled Meds:    albuterol 2.5 mg BID   atorvastatin 40 mg Daily   budesonide 0.5 mg BID - RT   finasteride 5 mg Daily   insulin aspart 0-9  Units 4x Daily With Meals & Nightly   ipratropium-albuterol 3 mL 4x Daily - RT   lacosamide 100 mg Q12H   levETIRAcetam 1,500 mg Q12H   levothyroxine 75 mcg Q AM   midodrine 10 mg TID   mupirocin  BID           INTAKE AND OUTPUT:    Intake/Output Summary (Last 24 hours) at 09/17/18 0957  Last data filed at 09/17/18 0928   Gross per 24 hour   Intake              650 ml   Output              625 ml   Net               25 ml       Review of Systems:   GI: NPO for OR this afternoon  Cardiac: No chest pain  Pulmonary: No increased work of breathing    Constitutional:  Temp:  [97.2 °F (36.2 °C)-98.8 °F (37.1 °C)] 97.2 °F (36.2 °C)  Heart Rate:  [52-97] 83  Resp:  [18-20] 20  BP: (136-141)/(73-81) 136/74    Physical Exam:  General:  Awake, alert elderly white male resting quietly Appears chronically ill, but in no acute distress  Eyes: PERTL,  HEENT:  Trach collar in place No JVD.  Oral mucosa dry, no mucosal pallor or cyanosis  Respiratory: Respirations regular and unlabored at rest. BBS with good air entry in all fields. No crackles or wheezes auscultated  Cardiovascular: S1S2 Regular rate and rhythm. No murmur  No pretibial pitting edema  Gastrointestinal: Abdomen soft, non tender.PEG clamped Bowel sounds present.   Musculoskeletal:  No abnormal movements  Extremities: No digital clubbing or cyanosis  Skin: Color pink. Skin warm and dry to touch.   Neuro: Awake and alert. Follows commands   Psych: pleasant and cooperative          Cardiographics  Telemetry:  SR with 1st degree and BBB        EKG 9-15-18:      Echocardiogram  Summary   Left ventricular chamber size, wall thickness, and systolic function are   within normal limits.   The ejection fraction biplane was calculated at 64%.   Moderately dilated right ventricle and global RV systolic function is   moderately reduced.   Trace mitral regurgitation.    Lab Review     Results from last 7 days  Lab Units 09/14/18 2002   TROPONIN T ng/mL <0.010     Results  "from last 7 days  Lab Units 09/16/18  0542   MAGNESIUM mg/dL 2.3     Results from last 7 days  Lab Units 09/16/18  0542   SODIUM mmol/L 139   POTASSIUM mmol/L 4.2   BUN mg/dL 30*   CREATININE mg/dL 0.82   CALCIUM mg/dL 9.2     Results from last 7 days  Lab Units 09/16/18  0542 09/15/18  0434 09/14/18 2002   WBC 10*3/mm3 6.45 8.63 8.05   HEMOGLOBIN g/dL 11.4* 12.2* 12.5*   HEMATOCRIT % 37.5* 39.6* 39.6*   PLATELETS 10*3/mm3 304 333 382     Results from last 7 days  Lab Units 09/16/18  0542 09/15/18  1016 09/14/18 2002   INR   --  1.18* 1.22*   APTT seconds 30.1  --   --          Assessment:  - 1st degree and (old) RBBB  - h/o PAF  - elevated [id number] -> Eliquis  - CAD - h/o CABG  - HFpEF  - HTN  - L1 compression fracture  - Seizure disorder  - Dysphagia with tracheostomy  - Neuro muscular dysfunction-indwelling Chandler  - G-tube dependency  - Hypothyroidism  - COPD      Plan:  - 1st degree and (old) RBBB -  PA prolongation and av blocks listed as potential adverse reactions of Vimpat.  , QT// 464       - h/o PAF - SR this admission    - elevated [id number] -> Eliquis on hold for surgery    - CAD - h/o CABG ~20 yrs ago. No angina  CE (-)    - HFpEF -  EF 60-65% per echo     - HTN -   130s/70s without pharmacologic therapy             Patient was cleared for back surgery without any additional cardiac testing.  Will check postop EKG and discuss alternative anti-seizure medications with primary service    Labs/tests ordered: post op EKG      )9/17/2018  Jonh Fitch MD      EMR Dragon/Transcription:   \"Dictated utilizing Dragon dictation\".     "

## 2018-09-17 NOTE — CONSULTS
Received consult request to review patient for ESBL infection. Patient reviewed and ESBL infection along with C diff infection history resolved. Patient completed treatment for both infections. Patient will require Contact Precautions for current MRSA infection in urine. Patient also with MRSA sputum 01/2018 which is less than one year. Discussed updates to patient's infection header and current isolation precautions needed with RN.

## 2018-09-17 NOTE — PLAN OF CARE
Problem: Fall Risk (Adult)  Goal: Absence of Fall  Outcome: Ongoing (interventions implemented as appropriate)      Problem: Skin Injury Risk (Adult)  Goal: Skin Health and Integrity  Outcome: Ongoing (interventions implemented as appropriate)      Problem: Seizure Disorder/Epilepsy (Adult)  Goal: Signs and Symptoms of Listed Potential Problems Will be Absent, Minimized or Managed (Seizure Disorder/Epilepsy)  Outcome: Ongoing (interventions implemented as appropriate)      Problem: Patient Care Overview  Goal: Plan of Care Review  Outcome: Ongoing (interventions implemented as appropriate)   09/17/18 0700   Coping/Psychosocial   Plan of Care Reviewed With patient   Plan of Care Review   Progress improving   OTHER   Outcome Summary Pt frequently seeks out staff, VSS with do distress or discomfort, reverse rendelenburg positoin mentained r/t to trach and spinal injury     Goal: Individualization and Mutuality  Outcome: Ongoing (interventions implemented as appropriate)   09/17/18 0700   Individualization   Patient Specific Preferences Pt prefers to have his trach suctioned every 15 min, educated for the need not of suction so frequently.   Patient Specific Goals (Include Timeframe) revent aspiration   Patient Specific Interventions elevate head via reverse trendelenburg due to trech and spinal injury

## 2018-09-17 NOTE — PROGRESS NOTES
[facility]     LOS: 1 day     Name: Lam Viramontes  Age: 88 y.o.  Sex: male  :  1930  MRN: [id number]         Primary Care Physician: Suman Jaimes III, MD    NPO Diet    Subjective   back is sore. denies chest pain, shortness of breath.    Objective   Temp:  [97.2 °F (36.2 °C)-98.8 °F (37.1 °C)] 97.2 °F (36.2 °C)  Heart Rate:  [68-97] 68  Resp:  [16-20] 16  BP: (136-141)/(73-81) 136/74  SpO2:  [96 %-99 %] 96 %  on  Flow (L/min):  [6-9] 6;   Device (Oxygen Therapy): tracheostomy collar  Body mass index is 22.58 kg/m².    Physical Exam   Constitutional: He is oriented to person, place, and time. No distress.   Neck:   tracheostomy   Cardiovascular: Normal rate and regular rhythm.    Pulmonary/Chest: Effort normal and breath sounds normal. No respiratory distress.   Abdominal: Soft. There is no tenderness. There is no rebound and no guarding.   Musculoskeletal: He exhibits no edema.   Neurological: He is alert and oriented to person, place, and time.   Skin: Skin is warm and dry.   Psychiatric: He has a normal mood and affect. His behavior is normal.     Reviewed medications and new clinical results    albuterol 2.5 mg Nebulization BID   atorvastatin 40 mg Per G Tube Daily   budesonide 0.5 mg Nebulization BID - RT   finasteride 5 mg Oral Daily   insulin aspart 0-9 Units Subcutaneous 4x Daily With Meals & Nightly   ipratropium-albuterol 3 mL Nebulization 4x Daily - RT   lacosamide 100 mg Per G Tube Q12H   levETIRAcetam 1,500 mg Per G Tube Q12H   levothyroxine 75 mcg Per G Tube Q AM   midodrine 10 mg Per G Tube TID   mupirocin  Each Nare BID        Results from last 7 days  Lab Units 18  0542 09/15/18  0434 18   WBC 10*3/mm3 6.45 8.63 8.05   HEMOGLOBIN g/dL 11.4* 12.2* 12.5*   PLATELETS 10*3/mm3 304 333 382       Results from last 7 days  Lab Units 18  0542 09/15/18  0434 18  2119 18   SODIUM mmol/L 139 133* 135*  131*   POTASSIUM mmol/L 4.2 5.5* 5.5* 5.9*   CHLORIDE mmol/L 101 97* 97* 93*   CO2 mmol/L 28.6 25.9 28.8 28.1   BUN mg/dL 30* 38* 39* 43*   CREATININE mg/dL 0.82 0.86 0.84 0.96   CALCIUM mg/dL 9.2 9.2 9.2 9.9   GLUCOSE mg/dL 89 97 89 94     Lab Results   Component Value Date    ANIONGAP 9.4 09/16/2018     Glucose   Date/Time Value Ref Range Status   09/17/2018 1137 115 70 - 130 mg/dL Final   09/17/2018 0611 133 (H) 70 - 130 mg/dL Final   09/16/2018 2109 110 70 - 130 mg/dL Final   09/16/2018 1716 110 70 - 130 mg/dL Final   09/16/2018 1132 112 70 - 130 mg/dL Final   09/16/2018 0622 94 70 - 130 mg/dL Final   09/15/2018 2130 118 70 - 130 mg/dL Final   09/15/2018 1637 131 (H) 70 - 130 mg/dL Final     Hemoglobin A1C   Date Value Ref Range Status   09/15/2018 6.50 (H) 4.80 - 5.60 % Final     Estimated Creatinine Clearance: 61.1 mL/min (by C-G formula based on SCr of 0.82 mg/dL).    abdominal XR noted. personally reviewed    Assessment/Plan   Active Hospital Problems    Diagnosis Date Noted   • **Closed compression fracture of L1 lumbar vertebra (CMS/[facility]) [S32.010A] 09/14/2018   • Hyperkalemia [E87.5] 09/15/2018   • Tracheostomy status (CMS/[facility]) [Z93.0] 09/15/2018   • Seizures (CMS/[facility]) [R56.9] 09/15/2018   • PAF (paroxysmal atrial fibrillation) (CMS/[facility]) [I48.0] 09/15/2018   • Neuromuscular dysfunction of bladder [N31.9] 09/15/2018   • Hypothyroidism [E03.9] 09/15/2018   • Gastrostomy tube dependent (CMS/[facility]) [Z93.1] 09/15/2018   • COPD (chronic obstructive pulmonary disease) (CMS/[facility]) [J44.9] 09/15/2018   • CAD (coronary artery disease) [I25.10] 09/15/2018   • AV block, 1st degree [I44.0] 09/15/2018   • Bifascicular block [I45.2] 09/15/2018   • Macrocytic anemia [D53.9] 09/15/2018   • Diabetes type 2, controlled (CMS/HCC) [E11.9] 09/15/2018   • History of Clostridium difficile colitis [Z86.19] 09/15/2018   • Anticoagulated [Z79.01] 09/15/2018      Resolved Hospital Problems    Diagnosis Date Noted Date Resolved   No  resolved problems to display.     · L1 fracture  · T12-L1 fusion today 3 pm  · CAD  · afib  · eliquis held  · hyperkalemia  · resolved  · DM 2, a1c 6.50  · controlled  · trach/PEG  · trach per pulmonology, he will continue follow-up with ENT outpatient  · NICKB  · clarke, chronic  · COPD  · Hypothyroidism  · Seizure  · discussed with Amrita Moody: on vimpat, prolonged ND on EKG. Amrita Moody will discuss with cardiologist if they would like change  · disposition  · TBD  · I discussed the patient's findings and my recommendations with patient, family and nursing staff and Amrita Moody.    Zach Craven MD   09/17/18  12:33 PM

## 2018-09-17 NOTE — NURSING NOTE
CWOCN consult received- reviewed chart. Buttocks is red without broken skin. Stage 1 pressure injury present on admission. Staff is turning patient. Recommend to continue turning, barrier ointment and accumax mattress at this time to prevent further breakdown.

## 2018-09-17 NOTE — ANESTHESIA PREPROCEDURE EVALUATION
Anesthesia Evaluation     Patient summary reviewed and Nursing notes reviewed   no history of anesthetic complications:  NPO Solid Status: > 8 hours  NPO Liquid Status: > 2 hours           Airway   Dental      Pulmonary    (+) COPD moderate,     ROS comment: Current trach in place.  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    Cardiovascular   Exercise tolerance: poor (<4 METS)    ECG reviewed  PT is on anticoagulation therapy    (+) hypertension well controlled 2 medications or greater, CAD, CABG, dysrhythmias Atrial Fib, hyperlipidemia,   (-) valvular problems/murmurs    ROS comment: Known CAD w/o hx/o MI or CHF per wife's history.  He had a 5v CABG approximately 20yrs ago.    Neuro/Psych  (+) seizures well controlled, CVA residual symptoms,     (-) TIA  GI/Hepatic/Renal/Endo    (+)   diabetes mellitus type 2 well controlled, hypothyroidism,   (-) hepatitis, liver disease, no renal disease    Musculoskeletal     (+) back pain,   Abdominal    Substance History - negative use     OB/GYN negative ob/gyn ROS         Other - negative ROS           Phys Exam Other: 6Fr cuffed trach in place currently.              Anesthesia Plan    ASA 4     general     intravenous induction   Anesthetic plan, all risks, benefits, and alternatives have been provided, discussed and informed consent has been obtained with: patient.    Plan discussed with CRNA and attending.

## 2018-09-17 NOTE — ANESTHESIA POSTPROCEDURE EVALUATION
"Patient: Lam Viramontes    Procedure Summary     Date:  09/17/18 Room / Location:  [facility] OR 07 / [facility] MAIN OR    Anesthesia Start:  1551 Anesthesia Stop:  1754    Procedure:  T12-L2 POSTERIOR ARTHODESIS FUSION WITH MEDTRONIC NAVIGATION (N/A Spine Lumbar) Diagnosis:      Surgeon:  Jose Daniel Tyler MD Provider:  Lg Grey MD    Anesthesia Type:  general ASA Status:  4          Anesthesia Type: general  Last vitals  BP   137/78 (09/17/18 1915)   Temp   36.6 °C (97.8 °F) (09/17/18 1751)   Pulse   74 (09/17/18 1915)   Resp   12 (09/17/18 1915)     SpO2   94 % (09/17/18 1915)     Post Anesthesia Care and Evaluation    Patient location during evaluation: bedside  Patient participation: complete - patient participated  Level of consciousness: awake  Pain score: 2  Pain management: adequate  Airway patency: patent  Anesthetic complications: No anesthetic complications  PONV Status: none  Cardiovascular status: acceptable  Respiratory status: acceptable  Hydration status: acceptable    Comments: /78 (BP Location: Right arm, Patient Position: Lying)   Pulse 74   Temp 36.6 °C (97.8 °F) (Oral)   Resp 12   Ht 175.3 cm (69.02\")   Wt 69.4 kg (153 lb)   SpO2 94%   BMI 22.58 kg/m²         "

## 2018-09-17 NOTE — PROGRESS NOTES
"CC:  No changes in pain. No new complaints.   Cleared for surgery by card.Nothing acute per pulm    Blood pressure 136/74, pulse 68, temperature 97.2 °F (36.2 °C), temperature source Oral, resp. rate 16, height 175.3 cm (69.02\"), weight 69.4 kg (153 lb), SpO2 96 %.      AA, Ox3  Lungs effort normal, has trach,   Has PEG  LUNSFORD    ..    Results from last 7 days  Lab Units 09/16/18  0542   WBC 10*3/mm3 6.45   HEMOGLOBIN g/dL 11.4*   HEMATOCRIT % 37.5*   PLATELETS 10*3/mm3 304       ..    Results from last 7 days  Lab Units 09/16/18  0542   SODIUM mmol/L 139   POTASSIUM mmol/L 4.2   CHLORIDE mmol/L 101   CO2 mmol/L 28.6   BUN mg/dL 30*   CREATININE mg/dL 0.82   GLUCOSE mg/dL 89   CALCIUM mg/dL 9.2       ..    Results from last 7 days  Lab Units 09/15/18  1016   INR  1.18*         S/p fall with 3 column fxr L1  Ankylosing spondylitis  Afib, off of Eliquis  Chronic trach and PEG related to prolonged hospitalization for seizure and resultant resp failure/dysphagia  DM  HTN  CAD/CABG  tob abuse in past/COPD    Dr. Tyler has discussed a T12-L2 fusion with the pt and his wife and they do wish to proceed. Due to some scheduling changes Dr. Tyler can now do this today.  Pt had 6am tube feed, nothing else by mouth. Is now NPO. Pt and wife wish to proceed with surgery as planned. Dr. Tyler is on his way here now. OR 3pm.         "

## 2018-09-17 NOTE — PROGRESS NOTES
Adult Nutrition  Assessment/PES    Patient Name:  Lam Viramontes  YOB: 1930  MRN: [id number]  Admit Date:  9/14/2018    Assessment Date:  9/17/2018    Comments:  Follow up.  In OR for T12-L2 fusion at time of visit today.  Only received one bolus of Osmolite 1.5 so far today d/t surgery.    RD to continue to follow.          Reason for Assessment     Row Name 09/17/18 1655          Reason for Assessment    Reason For Assessment follow-up protocol;TF/PN               Anthropometrics     Row Name 09/17/18 1655          Admit Weight    Admit Weight --   153# 9/17        Body Mass Index (BMI)    BMI Assessment BMI 18.5-24.9: normal             Labs/Tests/Procedures/Meds     Row Name 09/17/18 165          Labs/Procedures/Meds    Lab Results Reviewed reviewed, pertinent        Diagnostic Tests/Procedures    Diagnostic Test/Procedure Reviewed reviewed, pertinent     Diagnostic Test/Procedures Comments in OR currently for T12-L2 fusion        Medications    Pertinent Medications Reviewed reviewed, pertinent     Pertinent Medications Comments colace, insulin, synthroid, bactroban, laxative             Physical Findings     Row Name 09/17/18 1656          Physical Findings    Overall Physical Appearance on oxygen therapy   trach     Gastrointestinal feeding tube     Tubes gastrostomy tube     Skin pressure injury   st I coccyx, B=15               Nutrition Prescription Ordered     Row Name 09/17/18 0973          Nutrition Prescription PO    Current PO Diet NPO        Nutrition Prescription EN    Enteral Route Gastrostomy     Product Osmolite 1.5 stefan     TF Delivery Method Bolus     TF Bolus Goal Volume (mL) 240 mL     TF Bolus Frequency 4 times a day     Water flush (mL)  140 mL     Water Flush Frequency Every feeding   70 ml before and after each bolus             Evaluation of Received Nutrient/Fluid Intake     Row Name 09/17/18 0392          Nutrient/Fluid Evaluation    Additional Documentation Calories  Evaluation (Group);Protein Evaluation (Group)        Calories Evaluation    Enteral Calories (kcal) 355   only 1 can today so far d/t surgery     % of Kcal Needs 18        Protein Evaluation    Enteral Protein (gm) 15     % of Protein Needs 23        Intake Assessment    Energy/Calorie Requirement Assessment not meeting needs     Protein Requirement Assessment not meeting needs        EN Evaluation    TF Changes Held   d/t surgery today             Problem/Interventions:                  Intervention Goal     Row Name 09/17/18 1659          Intervention Goal    General Maintain nutrition;Meet nutritional needs for age/condition;Disease management/therapy;Nutrition support treatment;Reduce/improve symptoms     TF/PN Maintain TF/PN;Deliver (%) goal     Deliver % of Goal 100 %     Weight Maintain weight             Nutrition Intervention     Row Name 09/17/18 1659          Nutrition Intervention    RD/Tech Action Follow Tx progress;Care plan reviewd     Recommended/Ordered EN             Nutrition Prescription     Row Name 09/17/18 1659          Nutrition Prescription EN    Enteral Prescription Continue same protocol             Education/Evaluation     Row Name 09/17/18 1659          Education    Education Will Instruct as appropriate        Monitor/Evaluation    Monitor Per protocol;I&O;Pertinent labs;TF delivery/tolerance;Weight;Skin status;Symptoms         Electronically signed by:  Erin Steel RD  09/17/18 5:00 PM

## 2018-09-18 ENCOUNTER — APPOINTMENT (OUTPATIENT)
Dept: CARDIOLOGY | Facility: HOSPITAL | Age: 83
End: 2018-09-18

## 2018-09-18 ENCOUNTER — APPOINTMENT (OUTPATIENT)
Dept: GENERAL RADIOLOGY | Facility: HOSPITAL | Age: 83
End: 2018-09-18

## 2018-09-18 PROBLEM — E87.5 HYPERKALEMIA: Status: RESOLVED | Noted: 2018-09-15 | Resolved: 2018-09-18

## 2018-09-18 LAB
ANION GAP SERPL CALCULATED.3IONS-SCNC: 9.1 MMOL/L
BASOPHILS # BLD AUTO: 0.01 10*3/MM3 (ref 0–0.2)
BASOPHILS NFR BLD AUTO: 0.1 % (ref 0–1.5)
BUN BLD-MCNC: 23 MG/DL (ref 8–23)
BUN/CREAT SERPL: 32.9 (ref 7–25)
CALCIUM SPEC-SCNC: 9 MG/DL (ref 8.6–10.5)
CHLORIDE SERPL-SCNC: 98 MMOL/L (ref 98–107)
CO2 SERPL-SCNC: 26.9 MMOL/L (ref 22–29)
CREAT BLD-MCNC: 0.7 MG/DL (ref 0.76–1.27)
DEPRECATED RDW RBC AUTO: 50.9 FL (ref 37–54)
EOSINOPHIL # BLD AUTO: 0.05 10*3/MM3 (ref 0–0.7)
EOSINOPHIL NFR BLD AUTO: 0.5 % (ref 0.3–6.2)
ERYTHROCYTE [DISTWIDTH] IN BLOOD BY AUTOMATED COUNT: 13.1 % (ref 11.5–14.5)
GFR SERPL CREATININE-BSD FRML MDRD: 106 ML/MIN/1.73
GLUCOSE BLD-MCNC: 152 MG/DL (ref 65–99)
GLUCOSE BLDC GLUCOMTR-MCNC: 157 MG/DL (ref 70–130)
GLUCOSE BLDC GLUCOMTR-MCNC: 167 MG/DL (ref 70–130)
GLUCOSE BLDC GLUCOMTR-MCNC: 203 MG/DL (ref 70–130)
GLUCOSE BLDC GLUCOMTR-MCNC: 237 MG/DL (ref 70–130)
HCT VFR BLD AUTO: 36.3 % (ref 40.4–52.2)
HGB BLD-MCNC: 11.1 G/DL (ref 13.7–17.6)
IMM GRANULOCYTES # BLD: 0.02 10*3/MM3 (ref 0–0.03)
IMM GRANULOCYTES NFR BLD: 0.2 % (ref 0–0.5)
LYMPHOCYTES # BLD AUTO: 1.25 10*3/MM3 (ref 0.9–4.8)
LYMPHOCYTES NFR BLD AUTO: 13.5 % (ref 19.6–45.3)
MCH RBC QN AUTO: 32.5 PG (ref 27–32.7)
MCHC RBC AUTO-ENTMCNC: 30.6 G/DL (ref 32.6–36.4)
MCV RBC AUTO: 106.1 FL (ref 79.8–96.2)
MONOCYTES # BLD AUTO: 0.94 10*3/MM3 (ref 0.2–1.2)
MONOCYTES NFR BLD AUTO: 10.2 % (ref 5–12)
NEUTROPHILS # BLD AUTO: 6.96 10*3/MM3 (ref 1.9–8.1)
NEUTROPHILS NFR BLD AUTO: 75.5 % (ref 42.7–76)
PLATELET # BLD AUTO: 255 10*3/MM3 (ref 140–500)
PMV BLD AUTO: 10.3 FL (ref 6–12)
POTASSIUM BLD-SCNC: 4.7 MMOL/L (ref 3.5–5.2)
RBC # BLD AUTO: 3.42 10*6/MM3 (ref 4.6–6)
SODIUM BLD-SCNC: 134 MMOL/L (ref 136–145)
WBC NRBC COR # BLD: 9.23 10*3/MM3 (ref 4.5–10.7)

## 2018-09-18 PROCEDURE — 25010000002 HYDROMORPHONE PER 4 MG: Performed by: NEUROLOGICAL SURGERY

## 2018-09-18 PROCEDURE — 72100 X-RAY EXAM L-S SPINE 2/3 VWS: CPT

## 2018-09-18 PROCEDURE — 82962 GLUCOSE BLOOD TEST: CPT

## 2018-09-18 PROCEDURE — 85025 COMPLETE CBC W/AUTO DIFF WBC: CPT | Performed by: PHYSICIAN ASSISTANT

## 2018-09-18 PROCEDURE — 25810000003 POTASSIUM CHLORIDE PER 2 MEQ: Performed by: NEUROLOGICAL SURGERY

## 2018-09-18 PROCEDURE — 93010 ELECTROCARDIOGRAM REPORT: CPT | Performed by: INTERNAL MEDICINE

## 2018-09-18 PROCEDURE — 63710000001 INSULIN ASPART PER 5 UNITS: Performed by: HOSPITALIST

## 2018-09-18 PROCEDURE — 93005 ELECTROCARDIOGRAM TRACING: CPT | Performed by: HOSPITALIST

## 2018-09-18 PROCEDURE — 97162 PT EVAL MOD COMPLEX 30 MIN: CPT

## 2018-09-18 PROCEDURE — 93005 ELECTROCARDIOGRAM TRACING: CPT | Performed by: NURSE PRACTITIONER

## 2018-09-18 PROCEDURE — 94799 UNLISTED PULMONARY SVC/PX: CPT

## 2018-09-18 PROCEDURE — 25010000002 VANCOMYCIN PER 500 MG: Performed by: NEUROLOGICAL SURGERY

## 2018-09-18 PROCEDURE — 93970 EXTREMITY STUDY: CPT

## 2018-09-18 PROCEDURE — 99024 POSTOP FOLLOW-UP VISIT: CPT | Performed by: NURSE PRACTITIONER

## 2018-09-18 PROCEDURE — 80048 BASIC METABOLIC PNL TOTAL CA: CPT | Performed by: NEUROLOGICAL SURGERY

## 2018-09-18 PROCEDURE — 99232 SBSQ HOSP IP/OBS MODERATE 35: CPT | Performed by: INTERNAL MEDICINE

## 2018-09-18 RX ADMIN — BUDESONIDE 0.5 MG: 0.5 INHALANT RESPIRATORY (INHALATION) at 07:07

## 2018-09-18 RX ADMIN — HYDROCODONE BITARTRATE AND ACETAMINOPHEN 1 TABLET: 7.5; 325 TABLET ORAL at 09:22

## 2018-09-18 RX ADMIN — INSULIN ASPART 4 UNITS: 100 INJECTION, SOLUTION INTRAVENOUS; SUBCUTANEOUS at 21:31

## 2018-09-18 RX ADMIN — LACOSAMIDE 100 MG: 100 TABLET, FILM COATED ORAL at 09:20

## 2018-09-18 RX ADMIN — MIDODRINE HYDROCHLORIDE 10 MG: 5 TABLET ORAL at 16:00

## 2018-09-18 RX ADMIN — POLYETHYLENE GLYCOL 3350 17 G: 17 POWDER, FOR SOLUTION ORAL at 20:01

## 2018-09-18 RX ADMIN — LEVETIRACETAM 1500 MG: 100 SOLUTION ORAL at 09:19

## 2018-09-18 RX ADMIN — BUDESONIDE 0.5 MG: 0.5 INHALANT RESPIRATORY (INHALATION) at 20:55

## 2018-09-18 RX ADMIN — IPRATROPIUM BROMIDE AND ALBUTEROL SULFATE 3 ML: .5; 3 SOLUTION RESPIRATORY (INHALATION) at 20:55

## 2018-09-18 RX ADMIN — MIDODRINE HYDROCHLORIDE 10 MG: 5 TABLET ORAL at 20:01

## 2018-09-18 RX ADMIN — ALBUTEROL SULFATE 2.5 MG: 2.5 SOLUTION RESPIRATORY (INHALATION) at 07:07

## 2018-09-18 RX ADMIN — POTASSIUM CHLORIDE AND SODIUM CHLORIDE 100 ML/HR: 450; 150 INJECTION, SOLUTION INTRAVENOUS at 02:43

## 2018-09-18 RX ADMIN — IPRATROPIUM BROMIDE AND ALBUTEROL SULFATE 3 ML: .5; 3 SOLUTION RESPIRATORY (INHALATION) at 17:25

## 2018-09-18 RX ADMIN — HYDROCODONE BITARTRATE AND ACETAMINOPHEN 1 TABLET: 7.5; 325 TABLET ORAL at 05:31

## 2018-09-18 RX ADMIN — LEVETIRACETAM 1500 MG: 100 SOLUTION ORAL at 20:01

## 2018-09-18 RX ADMIN — HYDROCODONE BITARTRATE AND ACETAMINOPHEN 1 TABLET: 7.5; 325 TABLET ORAL at 20:01

## 2018-09-18 RX ADMIN — POLYETHYLENE GLYCOL 3350 17 G: 17 POWDER, FOR SOLUTION ORAL at 09:20

## 2018-09-18 RX ADMIN — HYDROMORPHONE HYDROCHLORIDE 0.5 MG: 1 INJECTION, SOLUTION INTRAMUSCULAR; INTRAVENOUS; SUBCUTANEOUS at 16:08

## 2018-09-18 RX ADMIN — MIDODRINE HYDROCHLORIDE 10 MG: 5 TABLET ORAL at 09:20

## 2018-09-18 RX ADMIN — ATORVASTATIN CALCIUM 40 MG: 20 TABLET, FILM COATED ORAL at 09:21

## 2018-09-18 RX ADMIN — INSULIN ASPART 2 UNITS: 100 INJECTION, SOLUTION INTRAVENOUS; SUBCUTANEOUS at 18:00

## 2018-09-18 RX ADMIN — MUPIROCIN: 20 OINTMENT TOPICAL at 20:01

## 2018-09-18 RX ADMIN — LACOSAMIDE 100 MG: 100 TABLET, FILM COATED ORAL at 20:01

## 2018-09-18 RX ADMIN — HYDROCODONE BITARTRATE AND ACETAMINOPHEN 1 TABLET: 5; 325 TABLET ORAL at 16:10

## 2018-09-18 RX ADMIN — LEVOTHYROXINE SODIUM 75 MCG: 75 TABLET ORAL at 05:31

## 2018-09-18 RX ADMIN — FINASTERIDE 5 MG: 5 TABLET, FILM COATED ORAL at 09:22

## 2018-09-18 RX ADMIN — INSULIN ASPART 2 UNITS: 100 INJECTION, SOLUTION INTRAVENOUS; SUBCUTANEOUS at 09:21

## 2018-09-18 RX ADMIN — IPRATROPIUM BROMIDE AND ALBUTEROL SULFATE 3 ML: .5; 3 SOLUTION RESPIRATORY (INHALATION) at 12:08

## 2018-09-18 RX ADMIN — MUPIROCIN: 20 OINTMENT TOPICAL at 09:22

## 2018-09-18 RX ADMIN — VANCOMYCIN HYDROCHLORIDE 1000 MG: 1 INJECTION, SOLUTION INTRAVENOUS at 02:43

## 2018-09-18 RX ADMIN — VANCOMYCIN HYDROCHLORIDE 1000 MG: 1 INJECTION, SOLUTION INTRAVENOUS at 15:30

## 2018-09-18 RX ADMIN — INSULIN ASPART 4 UNITS: 100 INJECTION, SOLUTION INTRAVENOUS; SUBCUTANEOUS at 12:00

## 2018-09-18 NOTE — PLAN OF CARE
Problem: Fall Risk (Adult)  Goal: Absence of Fall  Outcome: Ongoing (interventions implemented as appropriate)      Problem: Skin Injury Risk (Adult)  Goal: Skin Health and Integrity  Outcome: Ongoing (interventions implemented as appropriate)      Problem: Infection, Risk/Actual (Adult)  Goal: Infection Prevention/Resolution  Outcome: Ongoing (interventions implemented as appropriate)      Problem: Airway, Artificial (Adult)  Goal: Signs and Symptoms of Listed Potential Problems Will be Absent, Minimized or Managed (Airway, Artificial)  Outcome: Ongoing (interventions implemented as appropriate)      Problem: Nutrition, Enteral (Adult)  Goal: Signs and Symptoms of Listed Potential Problems Will be Absent, Minimized or Managed (Nutrition, Enteral)  Outcome: Ongoing (interventions implemented as appropriate)      Problem: Pain, Acute (Adult)  Goal: Acceptable Pain Control/Comfort Level  Outcome: Ongoing (interventions implemented as appropriate)      Problem: Seizure Disorder/Epilepsy (Adult)  Goal: Signs and Symptoms of Listed Potential Problems Will be Absent, Minimized or Managed (Seizure Disorder/Epilepsy)  Outcome: Ongoing (interventions implemented as appropriate)      Problem: Patient Care Overview  Goal: Plan of Care Review  Outcome: Ongoing (interventions implemented as appropriate)   09/18/18 7444   Coping/Psychosocial   Plan of Care Reviewed With patient   Plan of Care Review   Progress improving   OTHER   Outcome Summary Spinal fusion performed. Pt doing well post-op. Pt stood up without assistance. Stood @ side of bed with balance help and returned to bed without assistance. Pt refused x-rays stating that he could not stand. Dr partida.

## 2018-09-18 NOTE — PROGRESS NOTES
"Pharmacokinetic Consult - Vancomycin Dosing (Initial Note)    Lam Viramontes has a consult for pharmacy to dose vancomycin x 24 hours for surgical prophylaxis.  Pharmacy dosing vancomycin per Dr. Tyler' request.     Relevant clinical data and objective history reviewed:  88 y.o. male 175.3 cm (69.02\") 69.4 kg (153 lb)    Past Medical History:   Diagnosis Date   • Ankylosing spondylitis lumbar region (CMS/HCC)    • BPH (benign prostatic hyperplasia)    • C. difficile diarrhea    • Constipation    • Coronary artery disease    • Diabetes mellitus (CMS/[facility])    • Dysphagia    • Hyperlipidemia    • Hypertension    • Hypothyroidism    • MRSA infection    • Neuromuscular dysfunction of bladder    • PAF (paroxysmal atrial fibrillation) (CMS/[facility])    • Pain    • Pressure ulcer of sacral region    • Seizures (CMS/[facility])    • Tracheostomy status (CMS/[facility])      Creatinine   Date Value Ref Range Status   09/16/2018 0.82 0.76 - 1.27 mg/dL Final   09/15/2018 0.86 0.76 - 1.27 mg/dL Final   09/14/2018 0.84 0.76 - 1.27 mg/dL Final     BUN   Date Value Ref Range Status   09/16/2018 30 (H) 8 - 23 mg/dL Final     Estimated Creatinine Clearance: 61.1 mL/min (by C-G formula based on SCr of 0.82 mg/dL).    Lab Results   Component Value Date    WBC 6.45 09/16/2018     Temp Readings from Last 3 Encounters:   09/17/18 97.8 °F (36.6 °C) (Oral)   08/28/18 98.7 °F (37.1 °C) (Tympanic)   02/13/18 97 °F (36.1 °C) (Tympanic)        Assessment/Plan  Patient received 1gm Vanco IV at 1540 9/17. Will start vancomycin 1 gm IV q12h x 2 doses. Pharmacy will continue to follow daily while on vancomycin and adjust as needed.     Zelda Pyle [facility]    "

## 2018-09-18 NOTE — PROGRESS NOTES
[facility]     LOS: 2 days     Name: Lam Viramontes  Age: 88 y.o.  Sex: male  :  1930  MRN: [id number]         Primary Care Physician: Suman Jaimes III, MD    Diet Regular; Consistent Carbohydrate, Cardiac    Subjective   Back pain is improved    Objective   Temp:  [97.8 °F (36.6 °C)-98.3 °F (36.8 °C)] 98.2 °F (36.8 °C)  Heart Rate:  [] 87  Resp:  [12-23] 20  BP: (101-167)/(56-95) 125/68  SpO2:  [90 %-100 %] 98 %  on  Flow (L/min):  [4-6] 6;   Device (Oxygen Therapy): tracheostomy collar;humidified  Body mass index is 23.14 kg/m².    Physical Exam   Constitutional: He is oriented to person, place, and time. No distress.   Neck:   tracheostomy   Cardiovascular: Normal rate and regular rhythm.    Pulmonary/Chest: Effort normal and breath sounds normal. No respiratory distress.   Abdominal: Soft. There is no tenderness. There is no rebound and no guarding.   Musculoskeletal: He exhibits no edema.   Neurological: He is alert and oriented to person, place, and time.   Skin: Skin is warm and dry.   Psychiatric: He has a normal mood and affect. His behavior is normal.     Reviewed medications and new clinical results    albuterol 2.5 mg Nebulization BID   atorvastatin 40 mg Per G Tube Daily   budesonide 0.5 mg Nebulization BID - RT   docusate sodium 100 mg Oral Daily   finasteride 5 mg Oral Daily   insulin aspart 0-9 Units Subcutaneous 4x Daily With Meals & Nightly   ipratropium-albuterol 3 mL Nebulization 4x Daily - RT   lacosamide 100 mg Per G Tube Q12H   levETIRAcetam 1,500 mg Per G Tube Q12H   levothyroxine 75 mcg Per G Tube Q AM   midodrine 10 mg Per G Tube TID   mupirocin  Each Nare BID   polyethylene glycol 17 g Oral BID   vancomycin 1,000 mg Intravenous Q12H       lactated ringers 9 mL/hr Last Rate: 9 mL/hr (18 1540)   Pharmacy to dose vancomycin     sodium chloride 0.45 % with KCl 20 mEq 100 mL/hr Last Rate: 100 mL/hr (18 0243)        Results from last 7 days  Lab Units 09/18/18  0523 09/16/18  0542 09/15/18  0434 09/14/18 2002   WBC 10*3/mm3 9.23 6.45 8.63 8.05   HEMOGLOBIN g/dL 11.1* 11.4* 12.2* 12.5*   PLATELETS 10*3/mm3 255 304 333 382       Results from last 7 days  Lab Units 09/18/18  0523 09/16/18  0542 09/15/18  0434 09/14/18  2119 09/14/18  2002   SODIUM mmol/L 134* 139 133* 135* 131*   POTASSIUM mmol/L 4.7 4.2 5.5* 5.5* 5.9*   CHLORIDE mmol/L 98 101 97* 97* 93*   CO2 mmol/L 26.9 28.6 25.9 28.8 28.1   BUN mg/dL 23 30* 38* 39* 43*   CREATININE mg/dL 0.70* 0.82 0.86 0.84 0.96   CALCIUM mg/dL 9.0 9.2 9.2 9.2 9.9   GLUCOSE mg/dL 152* 89 97 89 94     Lab Results   Component Value Date    ANIONGAP 9.1 09/18/2018     Glucose   Date/Time Value Ref Range Status   09/18/2018 1049 237 (H) 70 - 130 mg/dL Final   09/18/2018 0545 157 (H) 70 - 130 mg/dL Final   09/17/2018 2051 143 (H) 70 - 130 mg/dL Final   09/17/2018 1516 109 70 - 130 mg/dL Final   09/17/2018 1137 115 70 - 130 mg/dL Final   09/17/2018 0611 133 (H) 70 - 130 mg/dL Final   09/16/2018 2109 110 70 - 130 mg/dL Final   09/16/2018 1716 110 70 - 130 mg/dL Final     Estimated Creatinine Clearance: 64.2 mL/min (A) (by C-G formula based on SCr of 0.7 mg/dL (L)).    Assessment/Plan   Active Hospital Problems    Diagnosis Date Noted   • **Closed compression fracture of L1 lumbar vertebra (CMS/[facility]) [S32.010A] 09/14/2018   • Tracheostomy status (CMS/[facility]) [Z93.0] 09/15/2018   • Seizures (CMS/HCC) [R56.9] 09/15/2018   • PAF (paroxysmal atrial fibrillation) (CMS/HCC) [I48.0] 09/15/2018   • Neuromuscular dysfunction of bladder [N31.9] 09/15/2018   • Hypothyroidism [E03.9] 09/15/2018   • Gastrostomy tube dependent (CMS/[facility]) [Z93.1] 09/15/2018   • COPD (chronic obstructive pulmonary disease) (CMS/[facility]) [J44.9] 09/15/2018   • CAD (coronary artery disease) [I25.10] 09/15/2018   • AV block, 1st degree [I44.0] 09/15/2018   • Bifascicular block [I45.2] 09/15/2018   • Macrocytic anemia [D53.9] 09/15/2018    • Diabetes type 2, controlled (CMS/HCC) [E11.9] 09/15/2018   • History of Clostridium difficile colitis [Z86.19] 09/15/2018   • Anticoagulated [Z79.01] 09/15/2018      Resolved Hospital Problems    Diagnosis Date Noted Date Resolved   • Hyperkalemia [E87.5] 09/15/2018 09/18/2018     · fall and L1 fracture  · s/p T12-L2 POSTERIOR ARTHODESIS FUSION WITH [employer] NAVIGATION 9/17/18  · CAD  · afib  · resume eliquis when okay with surgery  · hyperkalemia  · resolved  · DM 2, a1c 6.50  · Continue same for now  · trach/PEG  · he will continue follow-up with ENT outpatient  · NICKB  · clarke, chronic  · COPD  · Hypothyroidism  · Seizure  · discussed with Amrita Moody: on vimpat, prolonged [place] on EKG. Amrita Moody will discuss with cardiologist if they would like change  · disposition  · TBD  · I discussed the patient's findings and my recommendations with patient and nursing staff     Zach Craven MD   09/18/18  2:10 PM

## 2018-09-18 NOTE — PROGRESS NOTES
Postoperative visit      Smiling.  Feels better.  Nods head yes and no to questions. Stood at edge of bed and took some steps with PT (assist x 2) and tolerated well.       .  Vitals:    09/18/18 1407   BP: 125/68   Pulse: 87   Resp: 20   Temp: 98.2 °F (36.8 °C)   SpO2: 98%     Thoracolumbar incision okay. Dressing dry and intact.  Moves both legs well. Sensation intact.   No swelling, warmth but some mild bilateral calf tenderness to palpation.        9/18/2018 05:23   WBC 9.23       Fall with 3 column L1 fracture  Anklyosing spondylitis  POD 1 T12-L2 fusion  Hx of trach and PEG - resp failure and dysphagia  Afib - off Eliquis      Check LE venous dopplers due to calf tenderness  CBC am

## 2018-09-18 NOTE — PROGRESS NOTES
BLEV doppler completed.  Preliminary results:  Negative for DVT and STP in bilateral lower extremities.  Results given to OKSANA Stevenson.

## 2018-09-18 NOTE — PLAN OF CARE
Problem: Patient Care Overview  Goal: Plan of Care Review   09/18/18 0953   Coping/Psychosocial   Plan of Care Reviewed With patient   OTHER   Outcome Summary pt presents w. generalized weakness POD 1 T12-L2 fusion, good tolerance to standing edge of bed and side stepping with rwx, assist of 2. encouraged chair however pt refused, RN bedside and assessed pt. pt may benefit from skilled PT acutely to address functional deficits and assist with DC Planning.

## 2018-09-18 NOTE — PROGRESS NOTES
[facility]  Phone: [phone]  Handy Vilchis MD    Subjective:  LOS: 2    Awake and alert.  Apparently had his back surgery yesterday.  He remains with cuffed trach.  Not coughing up much.  Denies respiratory complaints.  Able to talk with his finger over the trach.    Objective   Vital Signs past 24hrs    Temp range: Temp (24hrs), Av °F (36.7 °C), Min:97.8 °F (36.6 °C), Max:98.3 °F (36.8 °C)    BP range: BP: (101-167)/(56-95) 109/60  Pulse range: Heart Rate:  [] 91  Resp rate range: Resp:  [12-23] 16    Device (Oxygen Therapy): tracheostomy collar;humidifiedFlow (L/min):  [4-6] 6  Oxygen range:SpO2:  [90 %-100 %] 96 %      71.1 kg (156 lb 12 oz); Body mass index is 23.14 kg/m².    Intake/Output Summary (Last 24 hours) at 18 1059  Last data filed at 18 0900   Gross per 24 hour   Intake             2165 ml   Output             1375 ml   Net              790 ml       Physical Exam   Cardiovascular: Normal rate and regular rhythm.    No murmur heard.  Pulmonary/Chest: He has decreased breath sounds. He has no wheezes. He has no rales.   Trach- #6 cuffed Shiley   Abdominal: Soft. Bowel sounds are normal. There is no tenderness.   PEG   Musculoskeletal: He exhibits no edema.   Neurological: He is alert.   Nursing note and vitals reviewed.    Results Review:    I have reviewed the laboratory and imaging data since the last note by [facility] physician.  My annotations are noted in assessment and plan.    Medication Review:  I have reviewed the current MAR.  My annotations are noted in assessment and plan.      lactated ringers 9 mL/hr Last Rate: 9 mL/hr (18 1540)   Pharmacy to dose vancomycin     sodium chloride 0.45 % with KCl 20 mEq 100 mL/hr Last Rate: 100 mL/hr (18 0243)     Plan   PCCM Problems  Chronic Tracheostomy with trach collar  Ex-smoker with possible underlying COPD  Chronic dysphagia with PEG feeding tube  Relevant Medical Diagnoses  Lumbar compression fracture  pending intervention  PAF  Diabetes  Chronic hypotension on midodrine    Plan of Treatment  Keep with cuffed trach for now.  He apparently had surgery yesterday.  I do not see an op note yet.    Continue nebs.    Can leave his trach as is.    He plans to see ENT as an outpatient.  He can be switched to a cuff less trach or other as per their decision.  I am also happy to switch it back to a cuff-less #6 Shiley if so required.    Will see as needed.    Handy Vilchis MD  09/18/18  10:59 AM    Part of this note may be an electronic transcription/translation of spoken language to printed text using the Dragon Dictation System.

## 2018-09-18 NOTE — PROGRESS NOTES
[facility]  Cardiology Progress Note    Patient Identification:  Name: Lam Viramontes  Age: 88 y.o.  Sex: male  :  1930  MRN: [id number]                 Follow Up / Chief Complaint: Surgical clearance, history of A. fib, CAD, CABG, RBBB, diastolic dysfunction, hypertension    Interval History:   88 year old man with history of Afib (eliquis), CAD s/p CABG, diastolic dysfunction, h/o transient AV block's and hypertension currently admitted with L1 compression fracture.  Asked to see for clearance for spinal surgery     Subjective:  Awake, alert. Shivering.  Complaining of feeling cold.  Patient denies chest pain or shortness of breath    Objective:  Sinus - sinus tach. , QTc 457  -120's / 60's  Afeb  Sat >95% on 6L trach collar    Na 134    Past Medical History:  Past Medical History:   Diagnosis Date   • Ankylosing spondylitis lumbar region (CMS/HCC)    • BPH (benign prostatic hyperplasia)    • C. difficile diarrhea    • Constipation    • Coronary artery disease    • Diabetes mellitus (CMS/HCC)    • Dysphagia    • Hyperlipidemia    • Hypertension    • Hypothyroidism    • MRSA infection    • Neuromuscular dysfunction of bladder    • PAF (paroxysmal atrial fibrillation) (CMS/HCC)    • Pain    • Pressure ulcer of sacral region    • Seizures (CMS/HCC)    • Tracheostomy status (CMS/HCC)      Past Surgical History:  Past Surgical History:   Procedure Laterality Date   • CARDIAC SURGERY     • CORONARY ARTERY BYPASS GRAFT      x 5   • GTUBE INSERTION     • LUMBAR DISCECTOMY FUSION INSTRUMENTATION N/A 2018    Procedure: T12-L2 POSTERIOR ARTHODESIS FUSION WITH [employer] NAVIGATION;  Surgeon: Jose Daniel Tyler MD;  Location: [facility];  Service: Neurosurgery   • TRACHEOSTOMY          Social History:   Social History   Substance Use Topics   • Smoking status: Former Smoker   • Smokeless tobacco: Never Used   • Alcohol use No      Family History:  History reviewed. No pertinent  family history.       Allergies:  No Known Allergies  Scheduled Meds:    albuterol 2.5 mg BID   atorvastatin 40 mg Daily   budesonide 0.5 mg BID - RT   docusate sodium 100 mg Daily   finasteride 5 mg Daily   insulin aspart 0-9 Units 4x Daily With Meals & Nightly   ipratropium-albuterol 3 mL 4x Daily - RT   lacosamide 100 mg Q12H   levETIRAcetam 1,500 mg Q12H   levothyroxine 75 mcg Q AM   midodrine 10 mg TID   mupirocin  BID   polyethylene glycol 17 g BID           INTAKE AND OUTPUT:    Intake/Output Summary (Last 24 hours) at 09/18/18 1603  Last data filed at 09/18/18 1400   Gross per 24 hour   Intake             2735 ml   Output              675 ml   Net             2060 ml       Review of Systems:   GI: No nausea or vomiting  Cardiac: No chest pain  Pulmonary: No increased work of breathing    Constitutional:  Temp:  [97.8 °F (36.6 °C)-98.3 °F (36.8 °C)] 98.2 °F (36.8 °C)  Heart Rate:  [] 87  Resp:  [12-20] 20  BP: (101-167)/(56-95) 125/68    Physical Exam:  General:  Awake, alert and resting quietly shivering and complaints of being cold, but in no acute distress  Eyes: PERTL,  HEENT:  Trach collar in place, no secretions  Oral mucosa moist, no mucosal cyanosis  Respiratory: Respirations regular and unlabored at rest. BBS with good air entry in all fields. No crackles or wheezes auscultated  Cardiovascular: S1S2 Regular rate and rhythm. No murmur No pretibial pitting edema  Gastrointestinal: Abdomen soft, non tender.PEG noted Bowel sounds present.   Musculoskeletal:  No abnormal movements  Extremities: No digital clubbing or cyanosis  Skin: Color pink. Skin warm and dry to touch.   Neuro: Awake and alert.  Answering questions appropriately.  Follows commands   Psych: pleasant and cooperative          Cardiographics  Telemetry:             EKG 9-18-18:      Echocardiogram  Summary   Left ventricular chamber size, wall thickness, and systolic function are  within normal limits.   The ejection fraction  "biplane was calculated at 64%.   Moderately dilated right ventricle and global RV systolic function is moderately reduced.   Trace mitral regurgitation.    Lab Review     Results from last 7 days  Lab Units 09/18/18  0523   SODIUM mmol/L 134*   POTASSIUM mmol/L 4.7   BUN mg/dL 23   CREATININE mg/dL 0.70*   CALCIUM mg/dL 9.0     Results from last 7 days  Lab Units 09/18/18  0523 09/16/18  0542 09/15/18  0434   WBC 10*3/mm3 9.23 6.45 8.63   HEMOGLOBIN g/dL 11.1* 11.4* 12.2*   HEMATOCRIT % 36.3* 37.5* 39.6*   PLATELETS 10*3/mm3 255 304 333       Assessment:  - 1st degree and (old) RBBB  - h/o PAF  - elevated [id number] -> Eliquis  - CAD - h/o CABG  - HFpEF  - HTN  - L1 compression fracture  - Seizure disorder  - Dysphagia with tracheostomy  - Neuro muscular dysfunction-indwelling Chandler  - G-tube dependency  - Hypothyroidism  - COPD      Plan:  - 1st degree and (old) RBBB -  AK prolongation and av blocks listed as potential adverse reactions of Vimpat.  , QT/ / 457 (404/ 464)       - h/o PAF - SR on admission.  Currently tachycardic 120s with significant baseline motion artifact (shivering)    - elevated [id number] -> Eliquis on hold for surgery    - CAD - No angina. H/o CABG ~20 yrs ago. CE (-)    - HFpEF -  EF 60-65% per echo     - HTN -   109-120s/ 60s without pharmacologic therapy             Resume Eliquis once cleared by neurosurgery.  No acute changes on postop EKG.  Currently tachycardic. Will verify rhythm with EKG     Labs/tests ordered: EKG      )9/18/2018  Jonh Fitch MD      EMR Dragon/Transcription:   \"Dictated utilizing Dragon dictation\".     "

## 2018-09-18 NOTE — PROGRESS NOTES
Continued Stay Note  [facility]     Patient Name: Lam Viramontes  MRN: [id number]  Today's Date: 9/18/2018    Admit Date: 9/14/2018          Discharge Plan     Row Name 09/18/18 1523       Plan    Plan Plans are home with wife/  HH and pd caregivers    Plan Comments CCP spoke with pt's wife, Rola @ bedside.  Confirmed face sheet and pharmacy info.  Pt lives in home with spouse.  He has a stair lift x2 in home, from garage then to upstairs.  Pt gets supplies from [facility]. [facility] provides care for trach, tube feeds, meds.  But they do have help from [employer] 7 days a week., and she can increase those hours if needed.  CCP spoke with Rola about dc plans, and the possibility that pt may need short term rehab.  She states he has been to [facility] in the past and also had  HH. She will discuss with pt upon return to room.  CCP will follow. Tania Laureano RN/              Discharge Codes    No documentation.           Tania Laureano RN

## 2018-09-19 LAB
BACTERIA SPEC AEROBE CULT: NORMAL
BACTERIA SPEC AEROBE CULT: NORMAL
BH CV LOWER VASCULAR LEFT COMMON FEMORAL AUGMENT: NORMAL
BH CV LOWER VASCULAR LEFT COMMON FEMORAL COMPETENT: NORMAL
BH CV LOWER VASCULAR LEFT COMMON FEMORAL COMPRESS: NORMAL
BH CV LOWER VASCULAR LEFT COMMON FEMORAL PHASIC: NORMAL
BH CV LOWER VASCULAR LEFT COMMON FEMORAL SPONT: NORMAL
BH CV LOWER VASCULAR LEFT DISTAL FEMORAL COMPRESS: NORMAL
BH CV LOWER VASCULAR LEFT GASTRONEMIUS COMPRESS: NORMAL
BH CV LOWER VASCULAR LEFT GREATER SAPH AK COMPRESS: NORMAL
BH CV LOWER VASCULAR LEFT GREATER SAPH BK COMPRESS: NORMAL
BH CV LOWER VASCULAR LEFT MID FEMORAL AUGMENT: NORMAL
BH CV LOWER VASCULAR LEFT MID FEMORAL COMPETENT: NORMAL
BH CV LOWER VASCULAR LEFT MID FEMORAL COMPRESS: NORMAL
BH CV LOWER VASCULAR LEFT MID FEMORAL PHASIC: NORMAL
BH CV LOWER VASCULAR LEFT MID FEMORAL SPONT: NORMAL
BH CV LOWER VASCULAR LEFT PERONEAL COMPRESS: NORMAL
BH CV LOWER VASCULAR LEFT POPLITEAL AUGMENT: NORMAL
BH CV LOWER VASCULAR LEFT POPLITEAL COMPETENT: NORMAL
BH CV LOWER VASCULAR LEFT POPLITEAL COMPRESS: NORMAL
BH CV LOWER VASCULAR LEFT POPLITEAL PHASIC: NORMAL
BH CV LOWER VASCULAR LEFT POPLITEAL SPONT: NORMAL
BH CV LOWER VASCULAR LEFT POSTERIOR TIBIAL COMPRESS: NORMAL
BH CV LOWER VASCULAR LEFT PROXIMAL FEMORAL COMPRESS: NORMAL
BH CV LOWER VASCULAR LEFT SAPHENOFEMORAL JUNCTION AUGMENT: NORMAL
BH CV LOWER VASCULAR LEFT SAPHENOFEMORAL JUNCTION COMPRESS: NORMAL
BH CV LOWER VASCULAR LEFT SAPHENOFEMORAL JUNCTION PHASIC: NORMAL
BH CV LOWER VASCULAR LEFT SAPHENOFEMORAL JUNCTION SPONT: NORMAL
BH CV LOWER VASCULAR RIGHT COMMON FEMORAL AUGMENT: NORMAL
BH CV LOWER VASCULAR RIGHT COMMON FEMORAL COMPETENT: NORMAL
BH CV LOWER VASCULAR RIGHT COMMON FEMORAL COMPRESS: NORMAL
BH CV LOWER VASCULAR RIGHT COMMON FEMORAL PHASIC: NORMAL
BH CV LOWER VASCULAR RIGHT COMMON FEMORAL SPONT: NORMAL
BH CV LOWER VASCULAR RIGHT DISTAL FEMORAL COMPRESS: NORMAL
BH CV LOWER VASCULAR RIGHT GASTRONEMIUS COMPRESS: NORMAL
BH CV LOWER VASCULAR RIGHT GREATER SAPH AK COMPRESS: NORMAL
BH CV LOWER VASCULAR RIGHT GREATER SAPH BK COMPRESS: NORMAL
BH CV LOWER VASCULAR RIGHT MID FEMORAL AUGMENT: NORMAL
BH CV LOWER VASCULAR RIGHT MID FEMORAL COMPETENT: NORMAL
BH CV LOWER VASCULAR RIGHT MID FEMORAL COMPRESS: NORMAL
BH CV LOWER VASCULAR RIGHT MID FEMORAL PHASIC: NORMAL
BH CV LOWER VASCULAR RIGHT MID FEMORAL SPONT: NORMAL
BH CV LOWER VASCULAR RIGHT PERONEAL COMPRESS: NORMAL
BH CV LOWER VASCULAR RIGHT POPLITEAL AUGMENT: NORMAL
BH CV LOWER VASCULAR RIGHT POPLITEAL COMPETENT: NORMAL
BH CV LOWER VASCULAR RIGHT POPLITEAL COMPRESS: NORMAL
BH CV LOWER VASCULAR RIGHT POPLITEAL PHASIC: NORMAL
BH CV LOWER VASCULAR RIGHT POPLITEAL SPONT: NORMAL
BH CV LOWER VASCULAR RIGHT POSTERIOR TIBIAL COMPRESS: NORMAL
BH CV LOWER VASCULAR RIGHT PROXIMAL FEMORAL COMPRESS: NORMAL
BH CV LOWER VASCULAR RIGHT SAPHENOFEMORAL JUNCTION AUGMENT: NORMAL
BH CV LOWER VASCULAR RIGHT SAPHENOFEMORAL JUNCTION COMPRESS: NORMAL
BH CV LOWER VASCULAR RIGHT SAPHENOFEMORAL JUNCTION PHASIC: NORMAL
BH CV LOWER VASCULAR RIGHT SAPHENOFEMORAL JUNCTION SPONT: NORMAL
DEPRECATED RDW RBC AUTO: 50.7 FL (ref 37–54)
ERYTHROCYTE [DISTWIDTH] IN BLOOD BY AUTOMATED COUNT: 13.4 % (ref 11.5–14.5)
GLUCOSE BLDC GLUCOMTR-MCNC: 122 MG/DL (ref 70–130)
GLUCOSE BLDC GLUCOMTR-MCNC: 191 MG/DL (ref 70–130)
GLUCOSE BLDC GLUCOMTR-MCNC: 209 MG/DL (ref 70–130)
GLUCOSE BLDC GLUCOMTR-MCNC: 242 MG/DL (ref 70–130)
HCT VFR BLD AUTO: 31.8 % (ref 40.4–52.2)
HCT VFR BLD AUTO: 32.6 % (ref 40.4–52.2)
HGB BLD-MCNC: 10.2 G/DL (ref 13.7–17.6)
HGB BLD-MCNC: 9.9 G/DL (ref 13.7–17.6)
MCH RBC QN AUTO: 33 PG (ref 27–32.7)
MCHC RBC AUTO-ENTMCNC: 31.1 G/DL (ref 32.6–36.4)
MCV RBC AUTO: 106 FL (ref 79.8–96.2)
PLATELET # BLD AUTO: 218 10*3/MM3 (ref 140–500)
PMV BLD AUTO: 10.5 FL (ref 6–12)
RBC # BLD AUTO: 3 10*6/MM3 (ref 4.6–6)
WBC NRBC COR # BLD: 10.09 10*3/MM3 (ref 4.5–10.7)

## 2018-09-19 PROCEDURE — 97110 THERAPEUTIC EXERCISES: CPT

## 2018-09-19 PROCEDURE — 93005 ELECTROCARDIOGRAM TRACING: CPT | Performed by: NURSE PRACTITIONER

## 2018-09-19 PROCEDURE — 99024 POSTOP FOLLOW-UP VISIT: CPT | Performed by: PHYSICIAN ASSISTANT

## 2018-09-19 PROCEDURE — 93010 ELECTROCARDIOGRAM REPORT: CPT | Performed by: INTERNAL MEDICINE

## 2018-09-19 PROCEDURE — 85014 HEMATOCRIT: CPT | Performed by: HOSPITALIST

## 2018-09-19 PROCEDURE — 63710000001 INSULIN ASPART PER 5 UNITS: Performed by: HOSPITALIST

## 2018-09-19 PROCEDURE — 25010000002 HYDROMORPHONE PER 4 MG: Performed by: NEUROLOGICAL SURGERY

## 2018-09-19 PROCEDURE — 25810000003 POTASSIUM CHLORIDE PER 2 MEQ: Performed by: NEUROLOGICAL SURGERY

## 2018-09-19 PROCEDURE — 94799 UNLISTED PULMONARY SVC/PX: CPT

## 2018-09-19 PROCEDURE — 85027 COMPLETE CBC AUTOMATED: CPT | Performed by: NURSE PRACTITIONER

## 2018-09-19 PROCEDURE — 85018 HEMOGLOBIN: CPT | Performed by: HOSPITALIST

## 2018-09-19 PROCEDURE — 82962 GLUCOSE BLOOD TEST: CPT

## 2018-09-19 PROCEDURE — 99231 SBSQ HOSP IP/OBS SF/LOW 25: CPT | Performed by: INTERNAL MEDICINE

## 2018-09-19 RX ADMIN — HYDROMORPHONE HYDROCHLORIDE 0.5 MG: 1 INJECTION, SOLUTION INTRAMUSCULAR; INTRAVENOUS; SUBCUTANEOUS at 14:17

## 2018-09-19 RX ADMIN — HYDROMORPHONE HYDROCHLORIDE 0.5 MG: 1 INJECTION, SOLUTION INTRAMUSCULAR; INTRAVENOUS; SUBCUTANEOUS at 19:37

## 2018-09-19 RX ADMIN — POTASSIUM CHLORIDE AND SODIUM CHLORIDE 100 ML/HR: 450; 150 INJECTION, SOLUTION INTRAVENOUS at 22:19

## 2018-09-19 RX ADMIN — MIDODRINE HYDROCHLORIDE 10 MG: 5 TABLET ORAL at 10:18

## 2018-09-19 RX ADMIN — POLYETHYLENE GLYCOL 3350 17 G: 17 POWDER, FOR SOLUTION ORAL at 10:18

## 2018-09-19 RX ADMIN — INSULIN ASPART 4 UNITS: 100 INJECTION, SOLUTION INTRAVENOUS; SUBCUTANEOUS at 17:51

## 2018-09-19 RX ADMIN — DOCUSATE SODIUM 100 MG: 50 LIQUID ORAL at 10:18

## 2018-09-19 RX ADMIN — HYDROMORPHONE HYDROCHLORIDE 0.5 MG: 1 INJECTION, SOLUTION INTRAMUSCULAR; INTRAVENOUS; SUBCUTANEOUS at 22:27

## 2018-09-19 RX ADMIN — IPRATROPIUM BROMIDE AND ALBUTEROL SULFATE 3 ML: .5; 3 SOLUTION RESPIRATORY (INHALATION) at 07:42

## 2018-09-19 RX ADMIN — INSULIN ASPART 4 UNITS: 100 INJECTION, SOLUTION INTRAVENOUS; SUBCUTANEOUS at 12:11

## 2018-09-19 RX ADMIN — APIXABAN 5 MG: 5 TABLET, FILM COATED ORAL at 21:16

## 2018-09-19 RX ADMIN — POLYETHYLENE GLYCOL 3350 17 G: 17 POWDER, FOR SOLUTION ORAL at 21:16

## 2018-09-19 RX ADMIN — POTASSIUM CHLORIDE AND SODIUM CHLORIDE 100 ML/HR: 450; 150 INJECTION, SOLUTION INTRAVENOUS at 02:01

## 2018-09-19 RX ADMIN — LEVOTHYROXINE SODIUM 75 MCG: 75 TABLET ORAL at 05:23

## 2018-09-19 RX ADMIN — BUDESONIDE 0.5 MG: 0.5 INHALANT RESPIRATORY (INHALATION) at 07:42

## 2018-09-19 RX ADMIN — LEVETIRACETAM 1500 MG: 100 SOLUTION ORAL at 10:18

## 2018-09-19 RX ADMIN — MUPIROCIN: 20 OINTMENT TOPICAL at 21:17

## 2018-09-19 RX ADMIN — LEVETIRACETAM 1500 MG: 100 SOLUTION ORAL at 21:16

## 2018-09-19 RX ADMIN — HYDROMORPHONE HYDROCHLORIDE 0.5 MG: 1 INJECTION, SOLUTION INTRAMUSCULAR; INTRAVENOUS; SUBCUTANEOUS at 04:03

## 2018-09-19 RX ADMIN — BUDESONIDE 0.5 MG: 0.5 INHALANT RESPIRATORY (INHALATION) at 21:56

## 2018-09-19 RX ADMIN — LACOSAMIDE 100 MG: 100 TABLET, FILM COATED ORAL at 10:18

## 2018-09-19 RX ADMIN — POTASSIUM CHLORIDE AND SODIUM CHLORIDE 100 ML/HR: 450; 150 INJECTION, SOLUTION INTRAVENOUS at 12:12

## 2018-09-19 RX ADMIN — LACOSAMIDE 100 MG: 100 TABLET, FILM COATED ORAL at 21:16

## 2018-09-19 RX ADMIN — IPRATROPIUM BROMIDE AND ALBUTEROL SULFATE 3 ML: .5; 3 SOLUTION RESPIRATORY (INHALATION) at 11:25

## 2018-09-19 RX ADMIN — MIDODRINE HYDROCHLORIDE 10 MG: 5 TABLET ORAL at 21:16

## 2018-09-19 RX ADMIN — APIXABAN 5 MG: 5 TABLET, FILM COATED ORAL at 16:39

## 2018-09-19 RX ADMIN — INSULIN ASPART 2 UNITS: 100 INJECTION, SOLUTION INTRAVENOUS; SUBCUTANEOUS at 21:16

## 2018-09-19 RX ADMIN — MUPIROCIN 10 APPLICATION: 20 OINTMENT TOPICAL at 10:18

## 2018-09-19 RX ADMIN — MIDODRINE HYDROCHLORIDE 10 MG: 5 TABLET ORAL at 16:39

## 2018-09-19 RX ADMIN — ATORVASTATIN CALCIUM 40 MG: 20 TABLET, FILM COATED ORAL at 10:18

## 2018-09-19 RX ADMIN — IPRATROPIUM BROMIDE AND ALBUTEROL SULFATE 3 ML: .5; 3 SOLUTION RESPIRATORY (INHALATION) at 16:09

## 2018-09-19 RX ADMIN — FINASTERIDE 5 MG: 5 TABLET, FILM COATED ORAL at 10:18

## 2018-09-19 RX ADMIN — HYDROCODONE BITARTRATE AND ACETAMINOPHEN 1 TABLET: 7.5; 325 TABLET ORAL at 02:01

## 2018-09-19 RX ADMIN — HYDROCODONE BITARTRATE AND ACETAMINOPHEN 1 TABLET: 7.5; 325 TABLET ORAL at 21:16

## 2018-09-19 RX ADMIN — IPRATROPIUM BROMIDE AND ALBUTEROL SULFATE 3 ML: .5; 3 SOLUTION RESPIRATORY (INHALATION) at 21:56

## 2018-09-19 NOTE — PLAN OF CARE
Problem: Patient Care Overview  Goal: Plan of Care Review  Outcome: Ongoing (interventions implemented as appropriate)   09/19/18 0650   Coping/Psychosocial   Plan of Care Reviewed With patient   Plan of Care Review   Progress no change   OTHER   Outcome Summary Pt ET suctioned x 2 but had repeated requests for suctioning. Trach care completed with pt on call light continuously for the next 1.5 hours insisting something was wrong. Resp even/unlab. Lungs clear. No cough. Sat 95-98%. Resp called to assess, no issues identified. Pt requiring much of staff's time for repeated requests. Urine dark eva.

## 2018-09-19 NOTE — PROGRESS NOTES
[facility]               ASSOCIATES     LOS: 3 days     Name: Lam Viramontes  Age: 88 y.o.  Sex: male  :  1930  MRN: [id number]         Primary Care Physician: Suman Jaimes III, MD    NPO Diet NPO Except: Tube Feeding    Subjective   Back pain is the same as yesterday. Better than before surgery.    Objective   Temp:  [98.1 °F (36.7 °C)-98.8 °F (37.1 °C)] 98.2 °F (36.8 °C)  Heart Rate:  [] 99  Resp:  [16-22] 16  BP: (106-141)/(55-89) 131/69  SpO2:  [92 %-99 %] 97 %  on  Flow (L/min):  [6] 6;   Device (Oxygen Therapy): tracheostomy collar;humidified  Body mass index is 23.43 kg/m².    Physical Exam   Constitutional: He is oriented to person, place, and time. No distress.   Neck:   tracheostomy   Cardiovascular: Normal rate and regular rhythm.    Pulmonary/Chest: Effort normal and breath sounds normal. No respiratory distress.   Abdominal: Soft. There is no tenderness. There is no rebound and no guarding.   Musculoskeletal: He exhibits no edema.   Neurological: He is alert and oriented to person, place, and time.   Skin: Skin is warm and dry.   Psychiatric: He has a normal mood and affect. His behavior is normal.     Reviewed medications and new clinical results    albuterol 2.5 mg Nebulization BID   apixaban 5 mg Oral Q12H   atorvastatin 40 mg Per G Tube Daily   budesonide 0.5 mg Nebulization BID - RT   docusate sodium 100 mg Oral Daily   finasteride 5 mg Oral Daily   insulin aspart 0-9 Units Subcutaneous 4x Daily With Meals & Nightly   ipratropium-albuterol 3 mL Nebulization 4x Daily - RT   lacosamide 100 mg Per G Tube Q12H   levETIRAcetam 1,500 mg Per G Tube Q12H   levothyroxine 75 mcg Per G Tube Q AM   midodrine 10 mg Per G Tube TID   mupirocin  Each Nare BID   polyethylene glycol 17 g Oral BID       lactated ringers 9 mL/hr Last Rate: 9 mL/hr (18 1540)   sodium chloride 0.45 % with KCl 20 mEq 100 mL/hr Last Rate: 100 mL/hr (18 1212)     Results from  last 7 days  Lab Units 09/19/18  0500 09/18/18  0523 09/16/18  0542 09/15/18  0434 09/14/18 2002   WBC 10*3/mm3 10.09 9.23 6.45 8.63 8.05   HEMOGLOBIN g/dL 9.9* 11.1* 11.4* 12.2* 12.5*   PLATELETS 10*3/mm3 218 255 304 333 382       Results from last 7 days  Lab Units 09/18/18  0523 09/16/18  0542 09/15/18  0434 09/14/18 2119 09/14/18 2002   SODIUM mmol/L 134* 139 133* 135* 131*   POTASSIUM mmol/L 4.7 4.2 5.5* 5.5* 5.9*   CHLORIDE mmol/L 98 101 97* 97* 93*   CO2 mmol/L 26.9 28.6 25.9 28.8 28.1   BUN mg/dL 23 30* 38* 39* 43*   CREATININE mg/dL 0.70* 0.82 0.86 0.84 0.96   CALCIUM mg/dL 9.0 9.2 9.2 9.2 9.9   GLUCOSE mg/dL 152* 89 97 89 94     Lab Results   Component Value Date    ANIONGAP 9.1 09/18/2018     Glucose   Date/Time Value Ref Range Status   09/19/2018 1117 242 (H) 70 - 130 mg/dL Final   09/19/2018 0608 122 70 - 130 mg/dL Final   09/18/2018 2126 203 (H) 70 - 130 mg/dL Final   09/18/2018 1600 167 (H) 70 - 130 mg/dL Final   09/18/2018 1049 237 (H) 70 - 130 mg/dL Final   09/18/2018 0545 157 (H) 70 - 130 mg/dL Final   09/17/2018 2051 143 (H) 70 - 130 mg/dL Final   09/17/2018 1516 109 70 - 130 mg/dL Final     Estimated Creatinine Clearance: 65 mL/min (A) (by C-G formula based on SCr of 0.7 mg/dL (L)).    Assessment/Plan   Active Hospital Problems    Diagnosis Date Noted   • **Closed compression fracture of L1 lumbar vertebra (CMS/[facility]) [S32.010A] 09/14/2018   • Tracheostomy status (CMS/[facility]) [Z93.0] 09/15/2018   • Seizures (CMS/HCC) [R56.9] 09/15/2018   • PAF (paroxysmal atrial fibrillation) (CMS/[facility]) [I48.0] 09/15/2018   • Neuromuscular dysfunction of bladder [N31.9] 09/15/2018   • Hypothyroidism [E03.9] 09/15/2018   • Gastrostomy tube dependent (CMS/[facility]) [Z93.1] 09/15/2018   • COPD (chronic obstructive pulmonary disease) (CMS/[facility]) [J44.9] 09/15/2018   • CAD (coronary artery disease) [I25.10] 09/15/2018   • AV block, 1st degree [I44.0] 09/15/2018   • Bifascicular block [I45.2] 09/15/2018   • Macrocytic anemia  [D53.9] 09/15/2018   • Diabetes type 2, controlled (CMS/HCC) [E11.9] 09/15/2018   • History of Clostridium difficile colitis [Z86.19] 09/15/2018   • Anticoagulated [Z79.01] 09/15/2018      Resolved Hospital Problems    Diagnosis Date Noted Date Resolved   • Hyperkalemia [E87.5] 09/15/2018 09/18/2018     · fall and L1 fracture  · s/p T12-L2 POSTERIOR ARTHODESIS FUSION WITH [employer] NAVIGATION 9/17/18  · anemia  · monitor for acute blood loss  · CAD  · afib  · resume eliquis when okay with surgery  · hyperkalemia  · resolved  · DM 2, a1c 6.50  · Continue same for now  · trach/PEG  · he will continue follow-up with ENT outpatient  · KELSEA  · clarke, chronic  · COPD  · Hypothyroidism  · Seizure  · discussed with Amrita Moody: on vimpat, prolonged NH on EKG. Amrita Moody will discuss with cardiologist if they would like change  · disposition  · TBD  · I discussed the patient's findings and my recommendations with patient and nursing staff     Zach Craven MD   09/19/18  3:54 PM

## 2018-09-19 NOTE — PLAN OF CARE
"Problem: Patient Care Overview  Goal: Plan of Care Review   09/19/18 0943   Coping/Psychosocial   Plan of Care Reviewed With patient   Plan of Care Review   Progress improving   OTHER   Outcome Summary pt needs max encouragement from PT and RN to participate in PT. with much efforts, pt finally agreeable to stand and take side steps with PT, assist of 2 rwx. pt nods head \"no\" throughout session, RN bedside and attended to back dressing. will cont to attempt to progress as agreeble.          "

## 2018-09-19 NOTE — PROGRESS NOTES
[facility]  Cardiology Progress Note    Patient Identification:  Name: Lam Viramontes  Age: 88 y.o.  Sex: male  :  1930  MRN: [id number]                 Follow Up / Chief Complaint: Surgical clearance, history of A. fib, CAD, CABG, RBBB, diastolic dysfunction, hypertension    Interval History:   88 year old man with history of Afib (eliquis), CAD s/p CABG, diastolic dysfunction, h/o transient AV block's and hypertension currently admitted with L1 compression fracture.  Asked to see for clearance for spinal surgery     Subjective:  Denies chest pain or shortness of breath.    Objective:  Sinus 80's-90's  Sinus with first-degree heart block on EKG (see below)   's-140's / 60's-80's  Afeb  Sat >95% on 6L trach collar    H/H 9.9 / 31.8   (11.1 / 31.8)    Past Medical History:  Past Medical History:   Diagnosis Date   • Ankylosing spondylitis lumbar region (CMS/HCC)    • BPH (benign prostatic hyperplasia)    • C. difficile diarrhea    • Constipation    • Coronary artery disease    • Diabetes mellitus (CMS/HCC)    • Dysphagia    • Hyperlipidemia    • Hypertension    • Hypothyroidism    • MRSA infection    • Neuromuscular dysfunction of bladder    • PAF (paroxysmal atrial fibrillation) (CMS/HCC)    • Pain    • Pressure ulcer of sacral region    • Seizures (CMS/HCC)    • Tracheostomy status (CMS/HCC)      Past Surgical History:  Past Surgical History:   Procedure Laterality Date   • CARDIAC SURGERY     • CORONARY ARTERY BYPASS GRAFT      x 5   • GTUBE INSERTION     • LUMBAR DISCECTOMY FUSION INSTRUMENTATION N/A 2018    Procedure: T12-L2 POSTERIOR ARTHODESIS FUSION WITH MEDTRONIC NAVIGATION;  Surgeon: Jose Daniel Tyler MD;  Location: [facility];  Service: Neurosurgery   • TRACHEOSTOMY          Social History:   Social History   Substance Use Topics   • Smoking status: Former Smoker   • Smokeless tobacco: Never Used   • Alcohol use No      Family History:  History reviewed. No  pertinent family history.       Allergies:  No Known Allergies  Scheduled Meds:    albuterol 2.5 mg BID   atorvastatin 40 mg Daily   budesonide 0.5 mg BID - RT   docusate sodium 100 mg Daily   finasteride 5 mg Daily   insulin aspart 0-9 Units 4x Daily With Meals & Nightly   ipratropium-albuterol 3 mL 4x Daily - RT   lacosamide 100 mg Q12H   levETIRAcetam 1,500 mg Q12H   levothyroxine 75 mcg Q AM   midodrine 10 mg TID   mupirocin  BID   polyethylene glycol 17 g BID           INTAKE AND OUTPUT:    Intake/Output Summary (Last 24 hours) at 09/19/18 1221  Last data filed at 09/19/18 1211   Gross per 24 hour   Intake             4562 ml   Output             1525 ml   Net             3037 ml       Review of Systems:   GI: No nausea or abdominal pain   Cardiac: No chest pain  Pulmonary: No increased work of breathing    Constitutional:  Temp:  [98.1 °F (36.7 °C)-98.8 °F (37.1 °C)] 98.1 °F (36.7 °C)  Heart Rate:  [] 96  Resp:  [16-22] 16  BP: (106-141)/(55-89) 141/89    Physical Exam:  General:  Awake, alert and resting quietly Appears in no acute distress  HEENT:  Trach collar in place, no secretions  Oral mucosa moist, no mucosal cyanosis  Respiratory: Respirations regular and unlabored at rest. BBS with good air entry in all fields. No crackles or wheezes auscultated  Cardiovascular: S1S2 Regular rate and rhythm. No murmur No pretibial pitting edema  Gastrointestinal: Abdomen soft, non tender. Bowel sounds present.   Musculoskeletal:  No abnormal movements  Extremities: No digital clubbing or cyanosis  Skin: Color pale,pink. Skin warm and dry to touch.   Neuro: Awake and alert.  Answering questions appropriately.  Follows commands   Psych: pleasant and cooperative          Cardiographics  Telemetry:           EKG 9-19-18:          Echocardiogram  Summary   Left ventricular chamber size, wall thickness, and systolic function are  within normal limits.   The ejection fraction biplane was calculated at 64%.    "Moderately dilated right ventricle and global RV systolic function is moderately reduced.   Trace mitral regurgitation.    Lab Review     Results from last 7 days  Lab Units 09/18/18  0523   SODIUM mmol/L 134*   POTASSIUM mmol/L 4.7   BUN mg/dL 23   CREATININE mg/dL 0.70*   CALCIUM mg/dL 9.0       Results from last 7 days  Lab Units 09/19/18  0500 09/18/18  0523 09/16/18  0542   WBC 10*3/mm3 10.09 9.23 6.45   HEMOGLOBIN g/dL 9.9* 11.1* 11.4*   HEMATOCRIT % 31.8* 36.3* 37.5*   PLATELETS 10*3/mm3 218 255 304       Assessment:  - 1st degree and (old) RBBB  - h/o PAF  - elevated [id number] -> Eliquis  - CAD - h/o CABG  - HFpEF  - HTN  - L1 compression fracture  - Seizure disorder  - Dysphagia with tracheostomy  - Neuro muscular dysfunction-indwelling [place]  - G-tube dependency  - Hypothyroidism  - COPD      Plan:  - 1st degree and (old) RBBB -  NJ and QTc intervals stable        - h/o PAF - SR on admission.      - elevated [id number] -> resume Eliquis if okay with Neurosurgery    - CAD - No angina. H/o CABG ~20 yrs ago. CE (-)    - HFpEF -  EF 60-65% per echo     - HTN -   120s-140's/ 60s -80's     Resume Eliquis if OK with Neurosurgery.        Labs/tests ordered: Medication adjustment       )9/19/2018  MD THEIN Fields Tweegeeon/Transcription:   \"Dictated utilizing Dragon dictation\".     "

## 2018-09-19 NOTE — THERAPY TREATMENT NOTE
Acute Care - Physical Therapy Treatment Note  [facility]     Patient Name: Lam Viramontes  : 1930  MRN: [id number]  Today's Date: 2018  Onset of Illness/Injury or Date of Surgery: 18  Date of Referral to PT: 18  Referring Physician: Nayeli    Admit Date: 2018    Visit Dx:    ICD-10-CM ICD-9-CM   1. Closed compression fracture of first lumbar vertebra, initial encounter (CMS/[facility]) S32.010A 805.4   2. UTI due to extended-spectrum beta lactamase (ESBL) producing Escherichia coli N39.0 599.0    A49.8 041.49    Z16.12 V09.1   3. Impaired functional mobility, balance, gait, and endurance Z74.09 V49.89     Patient Active Problem List   Diagnosis   • Chest pain (non-cardiac)   • Atrial fibrillation (CMS/[facility])   • C. difficile diarrhea   • Coronary artery disease   • Diabetes mellitus (CMS/[facility])   • Disease of thyroid gland   • Hypertension   • Pressure ulcer of sacral region   • Ankylosing spondylitis lumbar region (CMS/[facility])   • Neurogenic bladder due to ankylosing spondylitis   • Tracheostomy in place (CMS/[facility])   • PEG (percutaneous endoscopic gastrostomy) status (CMS/[facility])   • Acute UTI   • Sepsis (CMS/[facility])   • Clostridium difficile colitis   • Anticoagulated   • Closed compression fracture of L1 lumbar vertebra (CMS/[facility])   • Tracheostomy status (CMS/[facility])   • Seizures (CMS/[facility])   • PAF (paroxysmal atrial fibrillation) (CMS/[facility])   • Neuromuscular dysfunction of bladder   • Hypothyroidism   • Gastrostomy tube dependent (CMS/[facility])   • COPD (chronic obstructive pulmonary disease) (CMS/[facility])   • CAD (coronary artery disease)   • AV block, 1st degree   • Bifascicular block   • Macrocytic anemia   • Diabetes type 2, controlled (CMS/[facility])   • History of Clostridium difficile colitis   • Anticoagulated       Therapy Treatment          Rehabilitation Treatment Summary     Row Name 18 0931             Treatment Time/Intention    Discipline physical therapist  -      Document Type therapy note (daily  note)  -      Subjective Information complains of;pain  -      Mode of Treatment individual therapy;physical therapy  -      Therapy Frequency (PT Clinical Impression) daily  -      Patient Effort good  -      Comment RN came bedside to attend to dressing - small blood drainage noted, RN reinforced  -2      Existing Precautions/Restrictions fall;oxygen therapy device and L/min   trach  -LH2      Patient Response to Treatment pt very resistant to PT, needs max encouragement to participate from PT and RN  -2      Recorded by [] Elsy Wilson, PT 09/19/18 0934  [LH2] Elsy Wilson, PT 09/19/18 0942      Row Name 09/19/18 0931             Cognitive Assessment/Intervention- PT/OT    Orientation Status (Cognition) oriented to;person;situation  -      Follows Commands (Cognition) follows one step commands;75-90% accuracy;physical/tactile prompts required;verbal cues/prompting required  -      Safety Deficit (Cognitive) mild deficit  -      Recorded by [] Elsy Wilson, PT 09/19/18 0942      Row Name 09/19/18 0931             Bed Mobility Assessment/Treatment    Supine-Sit [place] (Bed Mobility) moderate assist (50% patient effort);verbal cues  -      Sit-Supine [place] (Bed Mobility) moderate assist (50% patient effort);2 person assist  -      Sidelying-Sit [place] (Bed Mobility) moderate assist (50% patient effort)  -      Sit-Sidelying [place] (Bed Mobility) moderate assist (50% patient effort);2 person assist  -      Recorded by [] Elsy Wilson, PT 09/19/18 0942      Row Name 09/19/18 0931             Sit-Stand Transfer    Sit-Stand [place] (Transfers) minimum assist (75% patient effort);2 person assist;verbal cues;nonverbal cues (demo/gesture)  -      Assistive Device (Sit-Stand Transfers) walker, front-wheeled  -      Recorded by [] Elsy Wilson, PT 09/19/18 0942      Row Name 09/19/18 0931             Stand-Sit Transfer    Stand-Sit [place]  (Transfers) minimum assist (75% patient effort);2 person assist  -      Assistive Device (Stand-Sit Transfers) walker, front-wheeled  -LH      Recorded by [] Elsy Wilson, PT 09/19/18 0942      Row Name 09/19/18 0931             Gait/Stairs Assessment/Training    [place] Level (Gait) minimum assist (75% patient effort);2 person assist;verbal cues;nonverbal cues (demo/gesture)  -      Assistive Device (Gait) walker, front-wheeled  -LH      Distance in Feet (Gait) 5  -LH      Pattern (Gait) step-to  -LH      Deviations/Abnormal Patterns (Gait) antalgic  -      Bilateral Gait Deviations forward flexed posture;heel strike decreased  -      Comment (Gait/Stairs) side stepping and fwd and back  -LH      Recorded by [] Elsy Wilson, PT 09/19/18 0942      Row Name 09/19/18 0931             Motor Skills Assessment/Interventions    Additional Documentation Therapeutic Exercise (Group);Therapeutic Exercise Interventions (Group)  -      Recorded by [] Elsy Wilson, PT 09/19/18 0942      Row Name 09/19/18 0931             Therapeutic Exercise    Therapeutic Exercise seated, lower extremities  -      Additional Documentation Therapeutic Exercise (Row)  -      Recorded by [] Elsy Wilson, PT 09/19/18 0942      Row Name 09/19/18 0931             Lower Extremity Seated Therapeutic Exercise    Performed, Seated Lower Extremity (Therapeutic Exercise) hip flexion/extension;LAQ (long arc quad), knee extension;knee flexion/extension  -      Exercise Type, Seated Lower Extremity (Therapeutic Exercise) AROM (active range of motion)  -      Sets/Reps Detail, Seated Lower Extremity (Therapeutic Exercise) 1/15  -      Recorded by [] Elsy Wilson, PT 09/19/18 0942      Row Name 09/19/18 0931             Positioning and Restraints    Pre-Treatment Position in bed  -LH      Post Treatment Position bed  -      In Bed supine;notified nsg;call light within reach;encouraged to call for assist;exit alarm on   -LH      Recorded by [] Elsy Wilson, PT 09/19/18 0942      Row Name 09/19/18 0931             Pain Assessment    Additional Documentation Pain Scale: FACES Pre/Post-Treatment (Group)  -LH      Recorded by [] Elsy Wilson, PT 09/19/18 0942      Row Name 09/19/18 0931             Pain Scale: Numbers Pre/Post-Treatment    Pain Location - Side Bilateral  -LH      Pain Location - Orientation generalized  -LH      Pain Location back  -LH      Recorded by [] Elsy Wilson, PT 09/19/18 0942      Row Name 09/19/18 0931             Pain Scale: FACES Pre/Post-Treatment    Pain: FACES Scale, Pretreatment 4-->hurts little more  -LH      Pain: FACES Scale, Post-Treatment 4-->hurts little more  -LH      Recorded by [] Elsy Wilson, PT 09/19/18 0942      Row Name                Wound 09/15/18 0021 Bilateral coccyx pressure injury    Wound - Properties Group Date first assessed: 09/15/18 [PB] Time first assessed: 0021 [PB] Present On Admission : picture taken [PB] Side: Bilateral [PB] Location: coccyx [PB] Type: pressure injury [PB] Stage, Pressure Injury: Stage 1 [PB] Recorded by:  [PB] Emely Mcqueen RN 09/15/18 0123    Row Name                Wound 09/17/18 1657 Other (See comments) back incision    Wound - Properties Group Date first assessed: 09/17/18 [JT] Time first assessed: 1657 [JT] Side: Other (See comments) [JT] Location: back [JT] Type: incision [JT] Recorded by:  [JT] Altagracia Bob RN 09/17/18 1657      User Key  (r) = Recorded By, (t) = Taken By, (c) = Cosigned By    Initials Name Effective Dates Discipline     Elsy Wilson, PT 04/03/18 -  PT    PB Emely Mcqueen RN 06/16/16 -  Nurse    Altagracia Ledesma RN 06/16/16 -  Nurse          Wound 09/15/18 0021 Bilateral coccyx pressure injury (Active)   Dressing Appearance open to air 9/18/2018 11:53 PM   Closure None;Open to air 9/18/2018 11:53 PM   Base pink 9/18/2018 11:53 PM   Drainage Amount none 9/18/2018  2:00 PM   Care, Wound cleansed  with;soap and water 9/18/2018  2:00 PM       Wound 09/17/18 1657 Other (See comments) back incision (Active)   Dressing Appearance dried drainage;intact 9/18/2018 11:53 PM   Closure KEVEN 9/18/2018 11:53 PM   Drainage Amount none 9/18/2018  2:00 PM             Physical Therapy Education     Title: PT OT SLP Therapies (Done)     Topic: Physical Therapy (Done)     Point: Mobility training (Done)    Learning Progress Summary     Learner Status Readiness Method Response Comment Documented by    Patient Done Acceptance E VU,NR   09/19/18 0942     Done Acceptance E VU,NR   09/18/18 0952          Point: Home exercise program (Done)    Learning Progress Summary     Learner Status Readiness Method Response Comment Documented by    Patient Done Acceptance E VU,NR   09/19/18 0942     Done Acceptance E VU,NR   09/18/18 0952          Point: Body mechanics (Done)    Learning Progress Summary     Learner Status Readiness Method Response Comment Documented by    Patient Done Acceptance E VU,NR   09/19/18 0942     Done Acceptance E VU,NR   09/18/18 0952          Point: Precautions (Done)    Learning Progress Summary     Learner Status Readiness Method Response Comment Documented by    Patient Done Acceptance E VU,NR   09/19/18 0942     Done Acceptance E VU,[facility] 09/18/18 0952                      User Key     Initials Effective Dates Name Provider Type Discipline     04/03/18 -  Elsy Wilson, PT Physical Therapist PT                    PT Recommendation and Plan  Anticipated Discharge Disposition (PT): skilled nursing facility  Planned Therapy Interventions (PT Eval): gait training, bed mobility training, balance training, home exercise program, ROM (range of motion), strengthening, stretching, transfer training  Therapy Frequency (PT Clinical Impression): daily  Outcome Summary/Treatment Plan (PT)  Anticipated Discharge Disposition (PT): skilled nursing facility  Plan of Care Reviewed With: patient  Progress:  "improving  Outcome Summary: pt needs max encouragement from PT and RN to participate in PT. with much efforts, pt finally agreeable to stand and take side steps with PT, assist of 2 rwx. pt nods head \"no\" throughout session, RN bedside and attended to back dressing. will cont to attempt to progress as agreeble.           Outcome Measures     Row Name 09/19/18 0900 09/18/18 0900          How much help from another person do you currently need...    Turning from your back to your side while in flat bed without using bedrails? 2  - 2  -LH     Moving from lying on back to sitting on the side of a flat bed without bedrails? 2  - 2  -LH     Moving to and from a bed to a chair (including a wheelchair)? 2  - 2  -LH     Standing up from a chair using your arms (e.g., wheelchair, bedside chair)? 2  - 2  -LH     Climbing 3-5 steps with a railing? 2  - 2  -LH     To walk in hospital room? 2  - 2  -     AM-PAC 6 Clicks Score 12  - 12  -        Functional Assessment    Outcome Measure Options AM-PAC 6 Clicks Basic Mobility (PT)  - AM-PAC 6 Clicks Basic Mobility (PT)  -       User Key  (r) = Recorded By, (t) = Taken By, (c) = Cosigned By    Initials Name Provider Type     Elsy Wilson, PT Physical Therapist           Time Calculation:         PT Charges     Row Name 09/19/18 0945             Time Calculation    Start Time 0905  -      Stop Time 0930  -      Time Calculation (min) 25 min  -      PT Received On 09/19/18  -      PT - Next Appointment 09/20/18  -        User Key  (r) = Recorded By, (t) = Taken By, (c) = Cosigned By    Initials Name Provider Type     Elsy Wilson, PT Physical Therapist        Therapy Suggested Charges     Code   Minutes Charges    None           Therapy Charges for Today     Code Description Service Date Service Provider Modifiers Qty    52663154948 HC PT THER SUPP EA 15 MIN 9/18/2018 Elsy Wilson, PT GP 1    94942016364 HC PT EVAL MOD COMPLEXITY 2 9/18/2018 Steve, " Elsy STODDARD, PT GP 1    [id number]  PT THER PROC EA 15 MIN 9/19/2018 Elsy Wilson, PT GP 2          PT G-Codes  Outcome Measure Options: AM-PAC 6 Clicks Basic Mobility (PT)  AM-PAC 6 Clicks Score: 12    Elsy Wilson, PT  9/19/2018

## 2018-09-19 NOTE — PROGRESS NOTES
Case Management/Social Work    Patient Name:  Lam Viramontes  YOB: 1930  MRN: [id number]  Admit Date:  9/14/2018        TLSO brace needed.  Call placed to [facility] to fit pt.       Electronically signed by:  Tania Laureano RN  09/19/18 3:03 PM

## 2018-09-19 NOTE — PROGRESS NOTES
Pain is much better since surgery  Did get OOB with PT today, made some steps.  Takes a lot of encouragement to get him OOB, wife says this is baseline    Doppler negative for DVT    AVSS  AA, Ox3  Incision ok  LUNSFORD well    ..    Results from last 7 days  Lab Units 09/19/18  0500   WBC 10*3/mm3 10.09   HEMOGLOBIN g/dL 9.9*   HEMATOCRIT % 31.8*   PLATELETS 10*3/mm3 218       POD#2 s/p T12-L2 fusion for 3 column L1 fracture  Ankylosing spondylitis  Hx of trach and PEG s/p long hospitalization  Afib    Cont to mobilixe  TLSO brace ordered  Wound care  Ok to resume Eliquis    Will stop by tomorrow to make sure he has the brace. Once the brace is provided the patient can go home/rehab anytime. Brace to be used when sitting or standing. Cont with wound care. Asked RN to make a follow up appt with Dr. Tyler 2wks after surgery.

## 2018-09-20 LAB
BACTERIA SPEC RESP CULT: ABNORMAL
DEPRECATED RDW RBC AUTO: 50.5 FL (ref 37–54)
ERYTHROCYTE [DISTWIDTH] IN BLOOD BY AUTOMATED COUNT: 13.3 % (ref 11.5–14.5)
GLUCOSE BLDC GLUCOMTR-MCNC: 168 MG/DL (ref 70–130)
GLUCOSE BLDC GLUCOMTR-MCNC: 223 MG/DL (ref 70–130)
GLUCOSE BLDC GLUCOMTR-MCNC: 257 MG/DL (ref 70–130)
GLUCOSE BLDC GLUCOMTR-MCNC: 96 MG/DL (ref 70–130)
GRAM STN SPEC: ABNORMAL
HCT VFR BLD AUTO: 28.3 % (ref 40.4–52.2)
HCT VFR BLD AUTO: 30.4 % (ref 40.4–52.2)
HCT VFR BLD AUTO: 34.2 % (ref 40.4–52.2)
HGB BLD-MCNC: 10.7 G/DL (ref 13.7–17.6)
HGB BLD-MCNC: 8.6 G/DL (ref 13.7–17.6)
HGB BLD-MCNC: 9.6 G/DL (ref 13.7–17.6)
MCH RBC QN AUTO: 32.1 PG (ref 27–32.7)
MCHC RBC AUTO-ENTMCNC: 30.4 G/DL (ref 32.6–36.4)
MCV RBC AUTO: 105.6 FL (ref 79.8–96.2)
PLATELET # BLD AUTO: 189 10*3/MM3 (ref 140–500)
PMV BLD AUTO: 10.4 FL (ref 6–12)
RBC # BLD AUTO: 2.68 10*6/MM3 (ref 4.6–6)
WBC NRBC COR # BLD: 7.34 10*3/MM3 (ref 4.5–10.7)

## 2018-09-20 PROCEDURE — 63710000001 INSULIN ASPART PER 5 UNITS: Performed by: HOSPITALIST

## 2018-09-20 PROCEDURE — 93005 ELECTROCARDIOGRAM TRACING: CPT | Performed by: NURSE PRACTITIONER

## 2018-09-20 PROCEDURE — 99232 SBSQ HOSP IP/OBS MODERATE 35: CPT | Performed by: NURSE PRACTITIONER

## 2018-09-20 PROCEDURE — 85018 HEMOGLOBIN: CPT | Performed by: HOSPITALIST

## 2018-09-20 PROCEDURE — 94799 UNLISTED PULMONARY SVC/PX: CPT

## 2018-09-20 PROCEDURE — 82962 GLUCOSE BLOOD TEST: CPT

## 2018-09-20 PROCEDURE — 85027 COMPLETE CBC AUTOMATED: CPT | Performed by: HOSPITALIST

## 2018-09-20 PROCEDURE — 85014 HEMATOCRIT: CPT | Performed by: HOSPITALIST

## 2018-09-20 PROCEDURE — 25010000002 HYDROMORPHONE PER 4 MG: Performed by: NEUROLOGICAL SURGERY

## 2018-09-20 PROCEDURE — 97110 THERAPEUTIC EXERCISES: CPT

## 2018-09-20 PROCEDURE — 25810000003 POTASSIUM CHLORIDE PER 2 MEQ: Performed by: NEUROLOGICAL SURGERY

## 2018-09-20 PROCEDURE — 93010 ELECTROCARDIOGRAM REPORT: CPT | Performed by: INTERNAL MEDICINE

## 2018-09-20 RX ADMIN — LEVETIRACETAM 1500 MG: 100 SOLUTION ORAL at 08:14

## 2018-09-20 RX ADMIN — APIXABAN 5 MG: 5 TABLET, FILM COATED ORAL at 10:42

## 2018-09-20 RX ADMIN — HYDROCODONE BITARTRATE AND ACETAMINOPHEN 1 TABLET: 7.5; 325 TABLET ORAL at 11:51

## 2018-09-20 RX ADMIN — BUDESONIDE 0.5 MG: 0.5 INHALANT RESPIRATORY (INHALATION) at 09:06

## 2018-09-20 RX ADMIN — HYDROCODONE BITARTRATE AND ACETAMINOPHEN 1 TABLET: 7.5; 325 TABLET ORAL at 00:54

## 2018-09-20 RX ADMIN — DOCUSATE SODIUM 100 MG: 50 LIQUID ORAL at 08:19

## 2018-09-20 RX ADMIN — IPRATROPIUM BROMIDE AND ALBUTEROL SULFATE 3 ML: .5; 3 SOLUTION RESPIRATORY (INHALATION) at 12:12

## 2018-09-20 RX ADMIN — HYDROCODONE BITARTRATE AND ACETAMINOPHEN 1 TABLET: 7.5; 325 TABLET ORAL at 20:32

## 2018-09-20 RX ADMIN — MUPIROCIN 1 APPLICATION: 20 OINTMENT TOPICAL at 08:40

## 2018-09-20 RX ADMIN — HYDROCODONE BITARTRATE AND ACETAMINOPHEN 1 TABLET: 7.5; 325 TABLET ORAL at 06:13

## 2018-09-20 RX ADMIN — HYDROCODONE BITARTRATE AND ACETAMINOPHEN 1 TABLET: 7.5; 325 TABLET ORAL at 16:10

## 2018-09-20 RX ADMIN — FINASTERIDE 5 MG: 5 TABLET, FILM COATED ORAL at 08:15

## 2018-09-20 RX ADMIN — ATORVASTATIN CALCIUM 40 MG: 20 TABLET, FILM COATED ORAL at 08:15

## 2018-09-20 RX ADMIN — MIDODRINE HYDROCHLORIDE 10 MG: 5 TABLET ORAL at 20:22

## 2018-09-20 RX ADMIN — LACOSAMIDE 100 MG: 100 TABLET, FILM COATED ORAL at 20:22

## 2018-09-20 RX ADMIN — INSULIN ASPART 4 UNITS: 100 INJECTION, SOLUTION INTRAVENOUS; SUBCUTANEOUS at 17:19

## 2018-09-20 RX ADMIN — MIDODRINE HYDROCHLORIDE 10 MG: 5 TABLET ORAL at 16:10

## 2018-09-20 RX ADMIN — APIXABAN 5 MG: 5 TABLET, FILM COATED ORAL at 20:22

## 2018-09-20 RX ADMIN — HYDROMORPHONE HYDROCHLORIDE 0.5 MG: 1 INJECTION, SOLUTION INTRAMUSCULAR; INTRAVENOUS; SUBCUTANEOUS at 12:30

## 2018-09-20 RX ADMIN — POTASSIUM CHLORIDE AND SODIUM CHLORIDE 100 ML/HR: 450; 150 INJECTION, SOLUTION INTRAVENOUS at 08:37

## 2018-09-20 RX ADMIN — POLYETHYLENE GLYCOL 3350 17 G: 17 POWDER, FOR SOLUTION ORAL at 08:14

## 2018-09-20 RX ADMIN — IPRATROPIUM BROMIDE AND ALBUTEROL SULFATE 3 ML: .5; 3 SOLUTION RESPIRATORY (INHALATION) at 09:05

## 2018-09-20 RX ADMIN — MIDODRINE HYDROCHLORIDE 10 MG: 5 TABLET ORAL at 08:15

## 2018-09-20 RX ADMIN — POLYETHYLENE GLYCOL 3350 17 G: 17 POWDER, FOR SOLUTION ORAL at 20:22

## 2018-09-20 RX ADMIN — LEVETIRACETAM 1500 MG: 100 SOLUTION ORAL at 20:21

## 2018-09-20 RX ADMIN — INSULIN ASPART 2 UNITS: 100 INJECTION, SOLUTION INTRAVENOUS; SUBCUTANEOUS at 23:03

## 2018-09-20 RX ADMIN — IPRATROPIUM BROMIDE AND ALBUTEROL SULFATE 3 ML: .5; 3 SOLUTION RESPIRATORY (INHALATION) at 16:19

## 2018-09-20 RX ADMIN — HYDROMORPHONE HYDROCHLORIDE 0.5 MG: 1 INJECTION, SOLUTION INTRAMUSCULAR; INTRAVENOUS; SUBCUTANEOUS at 08:14

## 2018-09-20 RX ADMIN — LACOSAMIDE 100 MG: 100 TABLET, FILM COATED ORAL at 08:15

## 2018-09-20 RX ADMIN — LEVOTHYROXINE SODIUM 75 MCG: 75 TABLET ORAL at 06:13

## 2018-09-20 RX ADMIN — INSULIN ASPART 6 UNITS: 100 INJECTION, SOLUTION INTRAVENOUS; SUBCUTANEOUS at 11:50

## 2018-09-20 NOTE — PLAN OF CARE
Problem: Fall Risk (Adult)  Goal: Absence of Fall  Outcome: Ongoing (interventions implemented as appropriate)      Problem: Skin Injury Risk (Adult)  Goal: Skin Health and Integrity  Outcome: Ongoing (interventions implemented as appropriate)      Problem: Infection, Risk/Actual (Adult)  Goal: Infection Prevention/Resolution  Outcome: Ongoing (interventions implemented as appropriate)      Problem: Airway, Artificial (Adult)  Goal: Signs and Symptoms of Listed Potential Problems Will be Absent, Minimized or Managed (Airway, Artificial)  Outcome: Ongoing (interventions implemented as appropriate)      Problem: Nutrition, Enteral (Adult)  Goal: Signs and Symptoms of Listed Potential Problems Will be Absent, Minimized or Managed (Nutrition, Enteral)  Outcome: Ongoing (interventions implemented as appropriate)      Problem: Pain, Acute (Adult)  Goal: Acceptable Pain Control/Comfort Level  Outcome: Ongoing (interventions implemented as appropriate)      Problem: Seizure Disorder/Epilepsy (Adult)  Goal: Signs and Symptoms of Listed Potential Problems Will be Absent, Minimized or Managed (Seizure Disorder/Epilepsy)  Outcome: Ongoing (interventions implemented as appropriate)      Problem: Patient Care Overview  Goal: Plan of Care Review  Outcome: Ongoing (interventions implemented as appropriate)   09/20/18 0408   Coping/Psychosocial   Plan of Care Reviewed With patient   Plan of Care Review   Progress no change   OTHER   Outcome Summary Pt with increased c/o's pain this night. Call light on approx 30 times in first 3 hours. With reassurance, pt decreased amts of call lights.      Goal: Individualization and Mutuality  Outcome: Ongoing (interventions implemented as appropriate)

## 2018-09-20 NOTE — THERAPY TREATMENT NOTE
Acute Care - Physical Therapy Treatment Note  [facility]     Patient Name: Lam Viramontes  : 1930  MRN: [id number]  Today's Date: 2018  Onset of Illness/Injury or Date of Surgery: 18  Date of Referral to PT: 18  Referring Physician: Nayeli    Admit Date: 2018    Visit Dx:    ICD-10-CM ICD-9-CM   1. Closed compression fracture of first lumbar vertebra, initial encounter (CMS/[facility]) S32.010A 805.4   2. UTI due to extended-spectrum beta lactamase (ESBL) producing Escherichia coli N39.0 599.0    A49.8 041.49    Z16.12 V09.1   3. Impaired functional mobility, balance, gait, and endurance Z74.09 V49.89     Patient Active Problem List   Diagnosis   • Chest pain (non-cardiac)   • Atrial fibrillation (CMS/[facility])   • C. difficile diarrhea   • Coronary artery disease   • Diabetes mellitus (CMS/[facility])   • Disease of thyroid gland   • Hypertension   • Pressure ulcer of sacral region   • Ankylosing spondylitis lumbar region (CMS/[facility])   • Neurogenic bladder due to ankylosing spondylitis   • Tracheostomy in place (CMS/[facility])   • PEG (percutaneous endoscopic gastrostomy) status (CMS/[facility])   • Acute UTI   • Sepsis (CMS/[facility])   • Clostridium difficile colitis   • Anticoagulated   • Closed compression fracture of L1 lumbar vertebra (CMS/[facility])   • Tracheostomy status (CMS/[facility])   • Seizures (CMS/[facility])   • PAF (paroxysmal atrial fibrillation) (CMS/[facility])   • Neuromuscular dysfunction of bladder   • Hypothyroidism   • Gastrostomy tube dependent (CMS/[facility])   • COPD (chronic obstructive pulmonary disease) (CMS/[facility])   • CAD (coronary artery disease)   • AV block, 1st degree   • Bifascicular block   • Macrocytic anemia   • Diabetes type 2, controlled (CMS/[facility])   • History of Clostridium difficile colitis   • Anticoagulated       Therapy Treatment          Rehabilitation Treatment Summary     Row Name 18 1127             Treatment Time/Intention    Discipline physical therapist  -      Document Type therapy note (daily  note)  -      Subjective Information complains of;pain  -      Mode of Treatment individual therapy;physical therapy  -      Therapy Frequency (PT Clinical Impression) daily  -      Patient Effort adequate  -      Existing Precautions/Restrictions fall;oxygen therapy device and L/min   trach  -      Patient Response to Treatment pt more agreeable this date  -      Recorded by [] Elsy Wilson, PT 09/20/18 1130      Row Name 09/20/18 1127             Cognitive Assessment/Intervention- PT/OT    Orientation Status (Cognition) oriented to;person;situation  -      Follows Commands (Cognition) follows one step commands;over 90% accuracy;verbal cues/prompting required  -      Safety Deficit (Cognitive) mild deficit  -      Recorded by [] Elsy Wilson, PT 09/20/18 1130      Row Name 09/20/18 1127             Bed Mobility Assessment/Treatment    Supine-Sit [place] (Bed Mobility) minimum assist (75% patient effort);verbal cues;nonverbal cues (demo/gesture);moderate assist (50% patient effort)  -      Sit-Supine [place] (Bed Mobility) not tested  -      Sidelying-Sit [place] (Bed Mobility) minimum assist (75% patient effort);verbal cues;nonverbal cues (demo/gesture)  -      Sit-Sidelying [place] (Bed Mobility) not tested  -      Assistive Device (Bed Mobility) bed rails  -      Comment (Bed Mobility) log roll  -      Recorded by [] Elsy Wilson, PT 09/20/18 1130      Row Name 09/20/18 1127             Sit-Stand Transfer    Sit-Stand [place] (Transfers) minimum assist (75% patient effort);1 person to manage equipment;verbal cues;nonverbal cues (demo/gesture)  -      Assistive Device (Sit-Stand Transfers) walker, front-wheeled  -      Recorded by [] Elsy Wilson, PT 09/20/18 1130      Row Name 09/20/18 1127             Stand-Sit Transfer    Stand-Sit [place] (Transfers) minimum assist (75% patient effort);1 person to manage equipment;verbal  cues;nonverbal cues (demo/gesture)  -      Assistive Device (Stand-Sit Transfers) walker, front-wheeled  -LH      Recorded by [] Elsy Wilson, PT 09/20/18 1130      Row Name 09/20/18 1127             Gait/Stairs Assessment/Training    [place] Level (Gait) minimum assist (75% patient effort);1 person to manage equipment;verbal cues;nonverbal cues (demo/gesture)  -      Assistive Device (Gait) walker, front-wheeled  -LH      Distance in Feet (Gait) 5  -LH      Pattern (Gait) step-to  -LH      Deviations/Abnormal Patterns (Gait) antalgic;gait speed decreased  -      Bilateral Gait Deviations forward flexed posture;heel strike decreased  -      Comment (Gait/Stairs) steps OOB to chair  -LH      Recorded by [] Elsy Wilson, PT 09/20/18 1130      Row Name 09/20/18 1127             Positioning and Restraints    Pre-Treatment Position in bed  -      Post Treatment Position chair  -LH      In Chair reclined;call light within reach;encouraged to call for assist;exit alarm on;with nsg   RN bedside suctioning  -LH      Recorded by [] Elsy Wilson, PT 09/20/18 1130      Row Name 09/20/18 1127             Pain Scale: Numbers Pre/Post-Treatment    Pain Location - Side Bilateral  -LH      Pain Location - Orientation generalized  -LH      Pain Location back  -LH      Pain Intervention(s) Repositioned  -      Recorded by [] Elsy Wilson, PT 09/20/18 1130      Row Name 09/20/18 1127             Pain Scale: FACES Pre/Post-Treatment    Pain: FACES Scale, Pretreatment 2-->hurts little bit  -LH      Pain: FACES Scale, Post-Treatment 2-->hurts little bit  -      Recorded by [] Elsy Wilson, PT 09/20/18 1130      Row Name                Wound 09/15/18 0021 Bilateral coccyx pressure injury    Wound - Properties Group Date first assessed: 09/15/18 [PB] Time first assessed: 0021 [PB] Present On Admission : picture taken [PB] Side: Bilateral [PB] Location: coccyx [PB] Type: pressure injury [PB] Stage, Pressure  Injury: Stage 1 [PB] Recorded by:  [PB] Emely Mcqueen RN 09/15/18 0123    Row Name                Wound 09/17/18 1657 Other (See comments) back incision    Wound - Properties Group Date first assessed: 09/17/18 [JT] Time first assessed: 1657 [JT] Side: Other (See comments) [JT] Location: back [JT] Type: incision [JT] Recorded by:  [JT] Altagracia Bob RN 09/17/18 1657      User Key  (r) = Recorded By, (t) = Taken By, (c) = Cosigned By    Initials Name Effective Dates Discipline     Steve Elsy K, PT 04/03/18 -  PT    PB Emely Mcqueen RN 06/16/16 -  Nurse    JT Altagracia Bob RN 06/16/16 -  Nurse          Wound 09/15/18 0021 Bilateral coccyx pressure injury (Active)   Dressing Appearance open to air 9/20/2018  8:30 AM   Closure None;Open to air 9/19/2018 11:11 PM   Base pink 9/20/2018  8:30 AM   Periwound blanchable 9/20/2018  8:30 AM   Drainage Amount none 9/20/2018  8:30 AM   Dressing Care, Wound open to air 9/20/2018  8:30 AM       Wound 09/17/18 1657 Other (See comments) back incision (Active)   Dressing Appearance intact;dry 9/20/2018  8:30 AM   Closure Adhesive bandage;Staples 9/20/2018  8:30 AM   Drainage Amount scant 9/20/2018  8:30 AM   Dressing Care, Wound dressing changed 9/20/2018  8:30 AM             Physical Therapy Education     Title: PT OT SLP Therapies (Done)     Topic: Physical Therapy (Done)     Point: Mobility training (Done)    Learning Progress Summary     Learner Status Readiness Method Response Comment Documented by    Patient Done Acceptance E VU,NR   09/19/18 0942     Done Acceptance E VU,NR   09/18/18 0952          Point: Home exercise program (Done)    Learning Progress Summary     Learner Status Readiness Method Response Comment Documented by    Patient Done Acceptance E VU,NR   09/19/18 0942     Done Acceptance E VU,NR   09/18/18 0952          Point: Body mechanics (Done)    Learning Progress Summary     Learner Status Readiness Method Response Comment  Documented by    Patient Done Acceptance E VU,NR   09/19/18 0942     Done Acceptance E VU,NR   09/18/18 0952          Point: Precautions (Done)    Learning Progress Summary     Learner Status Readiness Method Response Comment Documented by    Patient Done Acceptance E VU,NR   09/19/18 0942     Done Acceptance E VU,NR   09/18/18 0952                      User Key     Initials Effective Dates Name Provider Type Discipline     04/03/18 -  Elsy Wilson, PT Physical Therapist PT                    PT Recommendation and Plan  Anticipated Discharge Disposition (PT): skilled nursing facility  Planned Therapy Interventions (PT Eval): gait training, bed mobility training, balance training, home exercise program, ROM (range of motion), strengthening, stretching, transfer training  Therapy Frequency (PT Clinical Impression): daily  Outcome Summary/Treatment Plan (PT)  Anticipated Discharge Disposition (PT): skilled nursing facility  Plan of Care Reviewed With: patient  Progress: improving  Outcome Summary: pt agreeable to get OOB to chair this date, 2 person assist 2' equipment. donned TLSO with mobility and sitting, positioned sternal piece below trach site. RN notified of PT session. will cont to progress mobility as charu and appropriate.           Outcome Measures     Row Name 09/20/18 1100 09/19/18 0900 09/18/18 0900       How much help from another person do you currently need...    Turning from your back to your side while in flat bed without using bedrails? 3  - 2  - 2  -LH    Moving from lying on back to sitting on the side of a flat bed without bedrails? 3  - 2  - 2  -LH    Moving to and from a bed to a chair (including a wheelchair)? 3  - 2  - 2  -LH    Standing up from a chair using your arms (e.g., wheelchair, bedside chair)? 3  - 2  - 2  -LH    Climbing 3-5 steps with a railing? 2  - 2  - 2  -LH    To walk in hospital room? 2  - 2  -LH 2  -LH    AM-PAC 6 Clicks Score 16  - 12  -  12  -       Functional Assessment    Outcome Measure Options AM-PAC 6 Clicks Basic Mobility (PT)  - AM-PAC 6 Clicks Basic Mobility (PT)  - AM-PAC 6 Clicks Basic Mobility (PT)  -      User Key  (r) = Recorded By, (t) = Taken By, (c) = Cosigned By    Initials Name Provider Type     Elsy Wilson, PT Physical Therapist           Time Calculation:         PT Charges     Row Name 09/20/18 1133             Time Calculation    Start Time 1105  -      Stop Time 1126  -      Time Calculation (min) 21 min  -      PT Received On 09/20/18  -      PT - Next Appointment 09/21/18  -        User Key  (r) = Recorded By, (t) = Taken By, (c) = Cosigned By    Initials Name Provider Type     Elsy Wilson, PT Physical Therapist        Therapy Suggested Charges     Code   Minutes Charges    None           Therapy Charges for Today     Code Description Service Date Service Provider Modifiers Qty    87060357482 HC PT THER PROC EA 15 MIN 9/19/2018 Elsy Wilson, PT GP 2    90444005171 HC PT THER SUPP EA 15 MIN 9/20/2018 Elsy Wilson, PT GP 1    95196576792 HC PT THER PROC EA 15 MIN 9/20/2018 Elsy Wilson, PT GP 1          PT G-Codes  Outcome Measure Options: AM-PAC 6 Clicks Basic Mobility (PT)  AM-PAC 6 Clicks Score: 16    Elsy Wilson, PT  9/20/2018

## 2018-09-20 NOTE — PROGRESS NOTES
Adult Nutrition  Assessment/PES    Patient Name:  Lam Viramontes  YOB: 1930  MRN: [id number]  Admit Date:  9/14/2018    Assessment Date:  9/20/2018    Comments:  Follow up.  RN reports patient seems to be doing well on TFs, tolerating.  S/p surgery 9/17, pain improved since then per chart.    Will continue to follow.          Reason for Assessment     Row Name 09/20/18 1414          Reason for Assessment    Reason For Assessment TF/PN;follow-up protocol               Anthropometrics     Row Name 09/20/18 1414 09/20/18 0638       Anthropometrics    Weight  -- 77 kg (169 lb 12.1 oz)       Admit Weight    Admit Weight --   169# 9/20  --       Body Mass Index (BMI)    BMI Assessment BMI 25-29.9: overweight  --            Labs/Tests/Procedures/Meds     Row Name 09/20/18 1414          Labs/Procedures/Meds    Lab Results Reviewed reviewed, pertinent     Lab Results Comments gluc, hgb, hct        Diagnostic Tests/Procedures    Diagnostic Test/Procedure Reviewed reviewed, pertinent     Diagnostic Test/Procedures Comments s/p surgery, pain improved        Medications    Pertinent Medications Reviewed reviewed, pertinent     Pertinent Medications Comments colace, insulin, synthroid, laxative             Physical Findings     Row Name 09/20/18 1415          Physical Findings    Overall Physical Appearance on oxygen therapy   trach     Gastrointestinal feeding tube     Tubes gastrostomy tube     Skin other (see comments);pressure injury   B=18, st I coccyx, bruised, back inc               Nutrition Prescription Ordered     Row Name 09/20/18 1415          Nutrition Prescription PO    Current PO Diet NPO        Nutrition Prescription EN    Enteral Route Gastrostomy     Product Osmolite 1.5 stefan     TF Delivery Method Bolus     TF Bolus Goal Volume (mL) 240 mL     TF Bolus Frequency 4 times a day     Water flush (mL)  140 mL     Water Flush Frequency Every feeding   70 ml before and after each bolus              Evaluation of Received Nutrient/Fluid Intake     Row Name 09/20/18 1416          Nutrient/Fluid Evaluation    Additional Documentation Calories Evaluation (Group);Protein Evaluation (Group)        Calories Evaluation    Enteral Calories (kcal) 1420     % of Kcal Needs 73        Protein Evaluation    Enteral Protein (gm) 60     % of Protein Needs 94             Problem/Interventions:                  Intervention Goal     Row Name 09/20/18 1417          Intervention Goal    General Maintain nutrition;Nutrition support treatment;Reduce/improve symptoms;Meet nutritional needs for age/condition;Disease management/therapy     TF/PN Maintain TF/PN     Weight Maintain weight             Nutrition Intervention     Row Name 09/20/18 1417          Nutrition Intervention    RD/Tech Action Follow Tx progress;Care plan reviewd     Recommended/Ordered EN             Nutrition Prescription     Row Name 09/20/18 1417          Nutrition Prescription EN    Enteral Prescription Continue same protocol             Education/Evaluation     Row Name 09/20/18 1417          Education    Education Will Instruct as appropriate        Monitor/Evaluation    Monitor Per protocol;I&O;Pertinent labs;TF delivery/tolerance;Weight;Skin status;Symptoms         Electronically signed by:  Erin Steel RD  09/20/18 2:17 PM

## 2018-09-20 NOTE — OP NOTE
"\"Procedure Name\" Procedure Note     Indications: Mr Viramontes 89-year-old gentleman who suffers from ankylosing spondylitis.  He suffered a fall with resultant fracture through L1 extending through the anterior and posterior columns resulting in an unstable thoracic fracture.  He is here today for T12 through L2 posterior instrumented arthrodesis.  All the risks were explained and he indicated he understood and wished to proceed.       Surgeon: Jose Daniel Tyler MD     Assistants: None    Anesthesia: General endotracheal anesthesia    ASA Class: See anesthesia record    Procedure Details   1.  T12 through L2 posterior instrumented arthrodesis via pedicle screw fixation (Medtronic Solera system)  2.  Use of intraoperative CT in frameless stereotactic navigation  3.  Use of allograft ([employer]onic [place] DBF)    Findings:  The patient was taken to the operating theater whether placed under general endotracheal anesthesia.  The patient was then placed prone on the radiolucent operating table.  The head was rested neutrophil neurosurgical pillow and the arms were rested at 90° with arm boards.  All pressure points adequately padded and inspected.  The thoracolumbar region was then prepped and draped in a sterile fashion.  A timeout was conducted in the room to confirm the appropriate patient, site of surgery, and procedure.  Skin was then locally anesthetized 1% lidocaine with epinephrine.  The planned incision site was delineated palpating the 12th rib.  A 10 blade scalpel was then used to make a midline incision.  Hemostasis was achieved Bovie cauterization dissection was then carried out to the spinous processes.  Subperiosteal dissection was then carried out to the lateral aspect of the lamina to the medial aspect of the facets.  Self-retaining retractors were placed.  Palpating the 12th rib the 12th months process was identified.  It was completely exposed Bovie cautery.  The navigation platforms attached to the spinous " process.  Intraoperative CT was then brought into the field.  Scalpel navigation films demonstrated the clinic to be of the T12 spinous process.  At this time a CT scan was performed and the images were transferred to the navigation system.  Once confirmation of accuracy was determined the transverse process, facets were then exposed from T12 to L2.  At this time using the navigated awl was from T12 to L2 were generated.  The holes were then tapped with a 5.5 mm tap.  Prospective ball-tipped probe demonstrating no breach.  At this time 7.5 x 45 mm screws were placed at L1 and L2 bilaterally.  At T12 7 0.5 x 35 mm screws were placed.  At this time the intraoperative CT was brought back into the field and images were obtained.  The instrumentation was illustrated be in good location.  There was no complications appreciated.  At this time the wound was copiously irrigated bacitracin irrigation.  The bone was then decorticated with high-speed drill.  Allograft was then placed over the decorticated bone.  Rods were contoured and placed in the tulip heads and locking caps were applied.  Sequential tightening was then performed and final tightening was then performed breaking off the locking caps.  At this time the fascia was reapproximated with 0 Vicryl and subcutaneous tissues reapproximated 2-0 Vicryl.  The skin was closed staples.  Dressing was applied to the wound.  The patient was then placed back in supine position and awakened from anesthesia.  The patient was then next made and taken to recovery without incident.          Estimated Blood Loss:  30           Drains: none           Total IV Fluids: see anesthesia record           Specimens: None           Implants:   Implant Name Type Inv. Item Serial No.  Lot No. LRB No. Used   GRFT BONE MAGNIFUSE PC 1.75X5CM - [id number] - [id number] Implant GRFT BONE MAGNIFUSE PC 1.75X5CM [id number] [facility]  N/A 1   [facility] TI 4.75 - [id number]  Implant SCRW ST BREAKOFF SOLERA TI 4.75  [employer]  N/A 6   ARIN SOLERA COCR PREBNT [id number] - [id number] Implant ARIN SOLERA COCR PREBNT [id number]  [employer]  N/A 2   SCRW SOLERA MAS 4.75MM 6.5X50MM - [id number] Implant SCRW SOLERA MAS 4.75MM 6.5X50MM  [employer]  N/A 4   SCRW SOLERA MAS 4.75MM 6.5X45MM - [id number] Implant SCRW SOLERA MAS 4.75MM 6.5X45MM   [employer]   N/A 2              Complications:  none           Disposition: PACU - hemodynamically stable.           Condition: stable

## 2018-09-20 NOTE — PROGRESS NOTES
[facility]               ASSOCIATES     LOS: 4 days     Name: Lam Viramontes  Age: 88 y.o.  Sex: male  :  1930  MRN: [id number]         Primary Care Physician: Suman Jaimes III, MD    NPO Diet NPO Except: Tube Feeding    Subjective   No new complaints. Back sore.    Objective   Temp:  [98.4 °F (36.9 °C)-99.6 °F (37.6 °C)] 98.4 °F (36.9 °C)  Heart Rate:  [] 95  Resp:  [16-20] 16  BP: (133-154)/(76-80) 133/76  SpO2:  [93 %-98 %] 96 %  on  Flow (L/min):  [6-15] 15;   Device (Oxygen Therapy): tracheostomy collar  Body mass index is 25.06 kg/m².    Physical Exam   Constitutional: He is oriented to person, place, and time. No distress.   Neck:   tracheostomy   Cardiovascular: Normal rate and regular rhythm.    Pulmonary/Chest: Effort normal and breath sounds normal. No respiratory distress.   Abdominal: Soft. There is no tenderness. There is no rebound and no guarding.   Musculoskeletal: He exhibits no edema.   Neurological: He is alert and oriented to person, place, and time.   Skin: Skin is warm and dry.   Psychiatric: He has a normal mood and affect. His behavior is normal.     Reviewed medications and new clinical results    albuterol 2.5 mg Nebulization BID   apixaban 5 mg Oral Q12H   atorvastatin 40 mg Per G Tube Daily   budesonide 0.5 mg Nebulization BID - RT   docusate sodium 100 mg Oral Daily   finasteride 5 mg Oral Daily   insulin aspart 0-9 Units Subcutaneous 4x Daily With Meals & Nightly   ipratropium-albuterol 3 mL Nebulization 4x Daily - RT   lacosamide 100 mg Per G Tube Q12H   levETIRAcetam 1,500 mg Per G Tube Q12H   levothyroxine 75 mcg Per G Tube Q AM   midodrine 10 mg Per G Tube TID   mupirocin  Each Nare BID   polyethylene glycol 17 g Oral BID       lactated ringers 9 mL/hr Last Rate: 9 mL/hr (18 1540)   sodium chloride 0.45 % with KCl 20 mEq 100 mL/hr Last Rate: 100 mL/hr (18 0837)       Results from last 7 days  Lab Units 18  9527  09/20/18  0009 09/19/18  1634 09/19/18  0500 09/18/18  0523 09/16/18  0542 09/15/18  0434 09/14/18  2002   WBC 10*3/mm3 7.34  --   --  10.09 9.23 6.45 8.63 8.05   HEMOGLOBIN g/dL 8.6* 9.6* 10.2* 9.9* 11.1* 11.4* 12.2* 12.5*   PLATELETS 10*3/mm3 189  --   --  218 255 304 333 382       Results from last 7 days  Lab Units 09/18/18  0523 09/16/18  0542 09/15/18  0434 09/14/18 2119 09/14/18 2002   SODIUM mmol/L 134* 139 133* 135* 131*   POTASSIUM mmol/L 4.7 4.2 5.5* 5.5* 5.9*   CHLORIDE mmol/L 98 101 97* 97* 93*   CO2 mmol/L 26.9 28.6 25.9 28.8 28.1   BUN mg/dL 23 30* 38* 39* 43*   CREATININE mg/dL 0.70* 0.82 0.86 0.84 0.96   CALCIUM mg/dL 9.0 9.2 9.2 9.2 9.9   GLUCOSE mg/dL 152* 89 97 89 94     Lab Results   Component Value Date    ANIONGAP 9.1 09/18/2018     Glucose   Date/Time Value Ref Range Status   09/20/2018 1137 257 (H) 70 - 130 mg/dL Final   09/20/2018 0632 96 70 - 130 mg/dL Final   09/19/2018 2037 191 (H) 70 - 130 mg/dL Final   09/19/2018 1634 209 (H) 70 - 130 mg/dL Final   09/19/2018 1117 242 (H) 70 - 130 mg/dL Final   09/19/2018 0608 122 70 - 130 mg/dL Final   09/18/2018 2126 203 (H) 70 - 130 mg/dL Final   09/18/2018 1600 167 (H) 70 - 130 mg/dL Final     Estimated Creatinine Clearance: 69.5 mL/min (A) (by C-G formula based on SCr of 0.7 mg/dL (L)).    Assessment/Plan   Active Hospital Problems    Diagnosis Date Noted   • **Closed compression fracture of L1 lumbar vertebra (CMS/[facility]) [S32.010A] 09/14/2018   • Tracheostomy status (CMS/[facility]) [Z93.0] 09/15/2018   • Seizures (CMS/[facility]) [R56.9] 09/15/2018   • PAF (paroxysmal atrial fibrillation) (CMS/[facility]) [I48.0] 09/15/2018   • Neuromuscular dysfunction of bladder [N31.9] 09/15/2018   • Hypothyroidism [E03.9] 09/15/2018   • Gastrostomy tube dependent (CMS/[facility]) [Z93.1] 09/15/2018   • COPD (chronic obstructive pulmonary disease) (CMS/[facility]) [J44.9] 09/15/2018   • CAD (coronary artery disease) [I25.10] 09/15/2018   • AV block, 1st degree [I44.0] 09/15/2018   •  Bifascicular block [I45.2] 09/15/2018   • Macrocytic anemia [D53.9] 09/15/2018   • Diabetes type 2, controlled (CMS/[facility]) [E11.9] 09/15/2018   • History of Clostridium difficile colitis [Z86.19] 09/15/2018   • Anticoagulated [Z79.01] 09/15/2018      Resolved Hospital Problems    Diagnosis Date Noted Date Resolved   • Hyperkalemia [E87.5] 09/15/2018 09/18/2018     · fall and L1 fracture  · s/p T12-L2 POSTERIOR ARTHODESIS FUSION WITH [employer] NAVIGATION 9/17/18  · Follow-up Dr. Tyler 2 weeks  · anemia  · Continue to monitor for acute blood loss  · CAD  · afib  · eliquis resumed  · hyperkalemia  · resolved  · DM 2, a1c 6.50  · Continue same for now  · trach/PEG  · he will continue follow-up with ENT outpatient  · NGB  · clarke, chronic  · COPD  · Hypothyroidism  · Seizure  · disposition  · Home versus rehabilitation once brace provided  · Physical therapy following  · I discussed the patient's findings and my recommendations with patient and nursing staff     Zach Craven MD   09/20/18  1:42 PM

## 2018-09-20 NOTE — PLAN OF CARE
Problem: Patient Care Overview  Goal: Plan of Care Review   09/20/18 1131   Coping/Psychosocial   Plan of Care Reviewed With patient   Plan of Care Review   Progress improving   OTHER   Outcome Summary pt agreeable to get OOB to chair this date, 2 person assist 2' equipment and rwx. donned TLSO with mobility and sitting, positioned sternal piece below trach site. RN notified of PT session. will cont to progress mobility as charu and appropriate.

## 2018-09-20 NOTE — PROGRESS NOTES
[facility]  Cardiology Progress Note    Patient Identification:  Name: Lam Viramontes  Age: 88 y.o.  Sex: male  :  1930  MRN: [id number]                 Follow Up / Chief Complaint: Surgical clearance, history of A. fib, CAD, CABG, RBBB, diastolic dysfunction, hypertension    Interval History:   88 year old man with history of Afib (eliquis), CAD s/p CABG, diastolic dysfunction, h/o transient AV block's and hypertension currently admitted with L1 compression fracture.  Asked to see for clearance for spinal surgery     Subjective:  Denies pain    Objective:  Sinus 90's with first-degree heart block   -150's / 70's-80's  Afeb  Sat >95% on 6L trach collar    H/H 9.9 / 31.8   (11.1 / 31.8)    Past Medical History:  Past Medical History:   Diagnosis Date   • Ankylosing spondylitis lumbar region (CMS/HCC)    • BPH (benign prostatic hyperplasia)    • C. difficile diarrhea    • Constipation    • Coronary artery disease    • Diabetes mellitus (CMS/HCC)    • Dysphagia    • Hyperlipidemia    • Hypertension    • Hypothyroidism    • MRSA infection    • Neuromuscular dysfunction of bladder    • PAF (paroxysmal atrial fibrillation) (CMS/HCC)    • Pain    • Pressure ulcer of sacral region    • Seizures (CMS/HCC)    • Tracheostomy status (CMS/HCC)      Past Surgical History:  Past Surgical History:   Procedure Laterality Date   • CARDIAC SURGERY     • CORONARY ARTERY BYPASS GRAFT      x 5   • GTUBE INSERTION     • LUMBAR DISCECTOMY FUSION INSTRUMENTATION N/A 2018    Procedure: T12-L2 POSTERIOR ARTHODESIS FUSION WITH MEDTRONIC NAVIGATION;  Surgeon: Jose Daniel Tyler MD;  Location: [facility];  Service: Neurosurgery   • TRACHEOSTOMY          Social History:   Social History   Substance Use Topics   • Smoking status: Former Smoker   • Smokeless tobacco: Never Used   • Alcohol use No      Family History:  History reviewed. No pertinent family history.       Allergies:  No Known  Allergies  Scheduled Meds:    albuterol 2.5 mg BID   apixaban 5 mg Q12H   atorvastatin 40 mg Daily   budesonide 0.5 mg BID - RT   docusate sodium 100 mg Daily   finasteride 5 mg Daily   insulin aspart 0-9 Units 4x Daily With Meals & Nightly   ipratropium-albuterol 3 mL 4x Daily - RT   lacosamide 100 mg Q12H   levETIRAcetam 1,500 mg Q12H   levothyroxine 75 mcg Q AM   midodrine 10 mg TID   mupirocin  BID   polyethylene glycol 17 g BID           INTAKE AND OUTPUT:    Intake/Output Summary (Last 24 hours) at 09/20/18 1247  Last data filed at 09/20/18 0837   Gross per 24 hour   Intake             4184 ml   Output             1850 ml   Net             2334 ml       Review of Systems:   GI: No vomiting  Cardiac: No chest pain  Pulmonary: No increased work of breathing    Constitutional:  Temp:  [98.2 °F (36.8 °C)-99.6 °F (37.6 °C)] 98.4 °F (36.9 °C)  Heart Rate:  [] 95  Resp:  [16-20] 16  BP: (131-154)/(69-80) 133/76    Physical Exam:  General:  Awake, alert and sitting up at bedside Appears in no acute distress  HEENT:  Trach collar in place Oral mucosa moist, no cyanosis  Respiratory: Respirations regular and unlabored at rest. BBS with good air entry in all fields. No crackles or wheezes auscultated  Cardiovascular: S1S2 Regular rate and rhythm. No murmur No pretibial pitting edema  Gastrointestinal: Abdomen soft, non tender. Bowel sounds present.   Musculoskeletal:  No abnormal movements  Extremities: No digital clubbing or cyanosis  Skin: Color pink. Skin warm and dry to touch.   Neuro: Awake and alert.  Answering questions appropriately.  Follows commands   Psych: pleasant and cooperative      Cardiographics  Telemetry:       Echocardiogram  Summary   Left ventricular chamber size, wall thickness, and systolic function are  within normal limits.   The ejection fraction biplane was calculated at 64%.   Moderately dilated right ventricle and global RV systolic function is moderately reduced.   Trace mitral  "regurgitation.    Lab Review     Results from last 7 days  Lab Units 09/18/18  0523   SODIUM mmol/L 134*   POTASSIUM mmol/L 4.7   BUN mg/dL 23   CREATININE mg/dL 0.70*   CALCIUM mg/dL 9.0       Results from last 7 days  Lab Units 09/20/18  0506 09/20/18  0009 09/19/18  1634 09/19/18  0500 09/18/18  0523   WBC 10*3/mm3 7.34  --   --  10.09 9.23   HEMOGLOBIN g/dL 8.6* 9.6* 10.2* 9.9* 11.1*   HEMATOCRIT % 28.3* 30.4* 32.6* 31.8* 36.3*   PLATELETS 10*3/mm3 189  --   --  218 255       Assessment:  - 1st degree and (old) RBBB  - h/o PAF  - elevated [id number] -> Eliquis  - CAD - h/o CABG  - HFpEF  - HTN  - L1 compression fracture  - Seizure disorder  - Dysphagia with tracheostomy  - Neuro muscular dysfunction-indwelling Chandler  - G-tube dependency  - Hypothyroidism  - COPD      Plan:  - 1st degree and (old) RBBB -  KS and QTc intervals stable        - h/o PAF - SR since admission. Cleared by Neurosurgery to resume home Eliquis       - elevated [id number] ->  Cleared by Neurosurgery to resume home Eliquis       - CAD - No angina. H/o CABG ~20 yrs ago. CE (-)    - HFpEF -  EF 60-65% per echo     - HTN -   133-150/ 70s -80's     Sinus with first-degree and (old) RBBB.  Blood pressure stable.  Maintaining sinus rhythm during this admission.  Eliquis resumed after clearance by neurosurgery.  Stable cardiac status.  Little more to add at this point.  We'll sign off and see again upon request.  Thank you      )9/20/2018  MARK Johnsonon/Transcription:   \"Dictated utilizing Dragon dictation\".     "

## 2018-09-21 ENCOUNTER — APPOINTMENT (OUTPATIENT)
Dept: GENERAL RADIOLOGY | Facility: HOSPITAL | Age: 83
End: 2018-09-21

## 2018-09-21 LAB
ALBUMIN SERPL-MCNC: 2.8 G/DL (ref 3.5–5.2)
ALP SERPL-CCNC: 123 U/L (ref 39–117)
ALT SERPL W P-5'-P-CCNC: 35 U/L (ref 1–41)
ANION GAP SERPL CALCULATED.3IONS-SCNC: 7.2 MMOL/L
AST SERPL-CCNC: 31 U/L (ref 1–40)
BILIRUB CONJ SERPL-MCNC: <0.2 MG/DL (ref 0–0.3)
BILIRUB INDIRECT SERPL-MCNC: ABNORMAL MG/DL
BILIRUB SERPL-MCNC: 0.4 MG/DL (ref 0.1–1.2)
BUN BLD-MCNC: 19 MG/DL (ref 8–23)
BUN/CREAT SERPL: 25 (ref 7–25)
CALCIUM SPEC-SCNC: 9.1 MG/DL (ref 8.6–10.5)
CHLORIDE SERPL-SCNC: 98 MMOL/L (ref 98–107)
CO2 SERPL-SCNC: 30.8 MMOL/L (ref 22–29)
CREAT BLD-MCNC: 0.76 MG/DL (ref 0.76–1.27)
GFR SERPL CREATININE-BSD FRML MDRD: 97 ML/MIN/1.73
GLUCOSE BLD-MCNC: 115 MG/DL (ref 65–99)
GLUCOSE BLDC GLUCOMTR-MCNC: 119 MG/DL (ref 70–130)
GLUCOSE BLDC GLUCOMTR-MCNC: 141 MG/DL (ref 70–130)
GLUCOSE BLDC GLUCOMTR-MCNC: 211 MG/DL (ref 70–130)
GLUCOSE BLDC GLUCOMTR-MCNC: 245 MG/DL (ref 70–130)
HCT VFR BLD AUTO: 30.7 % (ref 40.4–52.2)
HGB BLD-MCNC: 9.6 G/DL (ref 13.7–17.6)
POTASSIUM BLD-SCNC: 5.2 MMOL/L (ref 3.5–5.2)
PROT SERPL-MCNC: 6.8 G/DL (ref 6–8.5)
SODIUM BLD-SCNC: 136 MMOL/L (ref 136–145)

## 2018-09-21 PROCEDURE — 93010 ELECTROCARDIOGRAM REPORT: CPT | Performed by: INTERNAL MEDICINE

## 2018-09-21 PROCEDURE — 80076 HEPATIC FUNCTION PANEL: CPT | Performed by: HOSPITALIST

## 2018-09-21 PROCEDURE — 94799 UNLISTED PULMONARY SVC/PX: CPT

## 2018-09-21 PROCEDURE — 63710000001 INSULIN ASPART PER 5 UNITS: Performed by: HOSPITALIST

## 2018-09-21 PROCEDURE — 80048 BASIC METABOLIC PNL TOTAL CA: CPT | Performed by: HOSPITALIST

## 2018-09-21 PROCEDURE — 25010000002 HYDROMORPHONE PER 4 MG: Performed by: NEUROLOGICAL SURGERY

## 2018-09-21 PROCEDURE — 25810000003 POTASSIUM CHLORIDE PER 2 MEQ: Performed by: NEUROLOGICAL SURGERY

## 2018-09-21 PROCEDURE — 82962 GLUCOSE BLOOD TEST: CPT

## 2018-09-21 PROCEDURE — 74018 RADEX ABDOMEN 1 VIEW: CPT

## 2018-09-21 PROCEDURE — 97110 THERAPEUTIC EXERCISES: CPT

## 2018-09-21 PROCEDURE — 93005 ELECTROCARDIOGRAM TRACING: CPT | Performed by: NURSE PRACTITIONER

## 2018-09-21 RX ADMIN — POLYETHYLENE GLYCOL 3350 17 G: 17 POWDER, FOR SOLUTION ORAL at 08:37

## 2018-09-21 RX ADMIN — APIXABAN 5 MG: 5 TABLET, FILM COATED ORAL at 22:23

## 2018-09-21 RX ADMIN — HYDROMORPHONE HYDROCHLORIDE 0.5 MG: 1 INJECTION, SOLUTION INTRAMUSCULAR; INTRAVENOUS; SUBCUTANEOUS at 16:09

## 2018-09-21 RX ADMIN — LEVETIRACETAM 1500 MG: 100 SOLUTION ORAL at 22:23

## 2018-09-21 RX ADMIN — DOCUSATE SODIUM 100 MG: 50 LIQUID ORAL at 08:36

## 2018-09-21 RX ADMIN — LEVOTHYROXINE SODIUM 75 MCG: 75 TABLET ORAL at 05:01

## 2018-09-21 RX ADMIN — ATORVASTATIN CALCIUM 40 MG: 20 TABLET, FILM COATED ORAL at 08:37

## 2018-09-21 RX ADMIN — LEVETIRACETAM 1500 MG: 100 SOLUTION ORAL at 08:36

## 2018-09-21 RX ADMIN — HYDROMORPHONE HYDROCHLORIDE 0.5 MG: 1 INJECTION, SOLUTION INTRAMUSCULAR; INTRAVENOUS; SUBCUTANEOUS at 02:15

## 2018-09-21 RX ADMIN — POTASSIUM CHLORIDE AND SODIUM CHLORIDE 100 ML/HR: 450; 150 INJECTION, SOLUTION INTRAVENOUS at 05:01

## 2018-09-21 RX ADMIN — BUDESONIDE 0.5 MG: 0.5 INHALANT RESPIRATORY (INHALATION) at 12:17

## 2018-09-21 RX ADMIN — BUDESONIDE 0.5 MG: 0.5 INHALANT RESPIRATORY (INHALATION) at 20:20

## 2018-09-21 RX ADMIN — IPRATROPIUM BROMIDE AND ALBUTEROL SULFATE 3 ML: .5; 3 SOLUTION RESPIRATORY (INHALATION) at 20:20

## 2018-09-21 RX ADMIN — INSULIN ASPART 4 UNITS: 100 INJECTION, SOLUTION INTRAVENOUS; SUBCUTANEOUS at 22:24

## 2018-09-21 RX ADMIN — HYDROCODONE BITARTRATE AND ACETAMINOPHEN 1 TABLET: 7.5; 325 TABLET ORAL at 22:24

## 2018-09-21 RX ADMIN — FINASTERIDE 5 MG: 5 TABLET, FILM COATED ORAL at 08:37

## 2018-09-21 RX ADMIN — HYDROMORPHONE HYDROCHLORIDE 0.5 MG: 1 INJECTION, SOLUTION INTRAMUSCULAR; INTRAVENOUS; SUBCUTANEOUS at 08:36

## 2018-09-21 RX ADMIN — APIXABAN 5 MG: 5 TABLET, FILM COATED ORAL at 08:40

## 2018-09-21 RX ADMIN — POLYETHYLENE GLYCOL 3350 17 G: 17 POWDER, FOR SOLUTION ORAL at 22:23

## 2018-09-21 RX ADMIN — LACOSAMIDE 100 MG: 100 TABLET, FILM COATED ORAL at 22:24

## 2018-09-21 RX ADMIN — INSULIN ASPART 4 UNITS: 100 INJECTION, SOLUTION INTRAVENOUS; SUBCUTANEOUS at 12:02

## 2018-09-21 RX ADMIN — IPRATROPIUM BROMIDE AND ALBUTEROL SULFATE 3 ML: .5; 3 SOLUTION RESPIRATORY (INHALATION) at 17:15

## 2018-09-21 RX ADMIN — MIDODRINE HYDROCHLORIDE 10 MG: 5 TABLET ORAL at 08:36

## 2018-09-21 RX ADMIN — IPRATROPIUM BROMIDE AND ALBUTEROL SULFATE 3 ML: .5; 3 SOLUTION RESPIRATORY (INHALATION) at 07:09

## 2018-09-21 RX ADMIN — LACOSAMIDE 100 MG: 100 TABLET, FILM COATED ORAL at 08:36

## 2018-09-21 RX ADMIN — IPRATROPIUM BROMIDE AND ALBUTEROL SULFATE 3 ML: .5; 3 SOLUTION RESPIRATORY (INHALATION) at 12:17

## 2018-09-21 NOTE — PROGRESS NOTES
Informed by nurse that patient requesting constant suction (as he is used to this at home.)-Advised patient that he did not need suction at this time and of dangers of too much suction. Patient agreeable.

## 2018-09-21 NOTE — PROGRESS NOTES
[facility]     LOS: 5 days     Name: Lam Viramontes  Age: 88 y.o.  Sex: male  :  1930  MRN: [id number]         Primary Care Physician: Suman Jaimes III, MD    NPO Diet NPO Except: Tube Feeding    Subjective   Back sore. problems with swelling around catheter could not pull foreskin back down per RN    Objective   Temp:  [97.7 °F (36.5 °C)-98.5 °F (36.9 °C)] 97.7 °F (36.5 °C)  Heart Rate:  [] 98  Resp:  [18-24] 22  BP: (134-147)/(75-92) 136/75  SpO2:  [91 %-98 %] 98 %  on  Flow (L/min):  [6] 6;   Device (Oxygen Therapy): tracheostomy collar  Body mass index is 26.52 kg/m².    Physical Exam   Constitutional: He is oriented to person, place, and time. No distress.   Neck:   tracheostomy   Cardiovascular: Normal rate and regular rhythm.    Pulmonary/Chest: Effort normal and breath sounds normal. No respiratory distress.   Abdominal: Soft. There is no tenderness. There is no rebound and no guarding.   Genitourinary:   Genitourinary Comments: was not able to extend foreskin but did not use force   Musculoskeletal: He exhibits no edema.   Neurological: He is alert and oriented to person, place, and time.   Skin: Skin is warm and dry.   Psychiatric: He has a normal mood and affect. His behavior is normal.     Reviewed medications and new clinical results    albuterol 2.5 mg Nebulization BID   apixaban 5 mg Oral Q12H   atorvastatin 40 mg Per G Tube Daily   budesonide 0.5 mg Nebulization BID - RT   docusate sodium 100 mg Oral Daily   finasteride 5 mg Oral Daily   insulin aspart 0-9 Units Subcutaneous 4x Daily With Meals & Nightly   ipratropium-albuterol 3 mL Nebulization 4x Daily - RT   lacosamide 100 mg Per G Tube Q12H   levETIRAcetam 1,500 mg Per G Tube Q12H   levothyroxine 75 mcg Per G Tube Q AM   midodrine 10 mg Per G Tube TID   mupirocin  Each Nare BID   polyethylene glycol 17 g Oral BID       lactated ringers 9 mL/hr Last Rate: 9 mL/hr (18  1540)   sodium chloride 0.45 % with KCl 20 mEq 100 mL/hr Last Rate: 100 mL/hr (09/21/18 0501)       Results from last 7 days  Lab Units 09/20/18  2353 09/20/18  1603 09/20/18  0506 09/20/18  0009 09/19/18  1634 09/19/18  0500 09/18/18  0523 09/16/18  0542 09/15/18  0434 09/14/18 2002   WBC 10*3/mm3  --   --  7.34  --   --  10.09 9.23 6.45 8.63 8.05   HEMOGLOBIN g/dL 9.6* 10.7* 8.6* 9.6* 10.2* 9.9* 11.1* 11.4* 12.2* 12.5*   PLATELETS 10*3/mm3  --   --  189  --   --  218 255 304 333 382       Results from last 7 days  Lab Units 09/21/18  0453 09/18/18  0523 09/16/18  0542 09/15/18  0434 09/14/18 2119 09/14/18 2002   SODIUM mmol/L 136 134* 139 133* 135* 131*   POTASSIUM mmol/L 5.2 4.7 4.2 5.5* 5.5* 5.9*   CHLORIDE mmol/L 98 98 101 97* 97* 93*   CO2 mmol/L 30.8* 26.9 28.6 25.9 28.8 28.1   BUN mg/dL 19 23 30* 38* 39* 43*   CREATININE mg/dL 0.76 0.70* 0.82 0.86 0.84 0.96   CALCIUM mg/dL 9.1 9.0 9.2 9.2 9.2 9.9   GLUCOSE mg/dL 115* 152* 89 97 89 94     Lab Results   Component Value Date    ANIONGAP 7.2 09/21/2018     Glucose   Date/Time Value Ref Range Status   09/21/2018 1127 245 (H) 70 - 130 mg/dL Final   09/21/2018 0525 141 (H) 70 - 130 mg/dL Final   09/20/2018 2043 168 (H) 70 - 130 mg/dL Final   09/20/2018 1621 223 (H) 70 - 130 mg/dL Final   09/20/2018 1137 257 (H) 70 - 130 mg/dL Final   09/20/2018 0632 96 70 - 130 mg/dL Final   09/19/2018 2037 191 (H) 70 - 130 mg/dL Final   09/19/2018 1634 209 (H) 70 - 130 mg/dL Final     Estimated Creatinine Clearance: 73.6 mL/min (by C-G formula based on SCr of 0.76 mg/dL).    Assessment/Plan   Active Hospital Problems    Diagnosis Date Noted   • **Closed compression fracture of L1 lumbar vertebra (CMS/[facility]) [S32.010A] 09/14/2018   • Tracheostomy status (CMS/[facility]) [Z93.0] 09/15/2018   • Seizures (CMS/[facility]) [R56.9] 09/15/2018   • PAF (paroxysmal atrial fibrillation) (CMS/[facility]) [I48.0] 09/15/2018   • Neuromuscular dysfunction of bladder [N31.9] 09/15/2018   • Hypothyroidism [E03.9]  09/15/2018   • Gastrostomy tube dependent (CMS/[facility]) [Z93.1] 09/15/2018   • COPD (chronic obstructive pulmonary disease) (CMS/[facility]) [J44.9] 09/15/2018   • CAD (coronary artery disease) [I25.10] 09/15/2018   • AV block, 1st degree [I44.0] 09/15/2018   • Bifascicular block [I45.2] 09/15/2018   • Macrocytic anemia [D53.9] 09/15/2018   • Diabetes type 2, controlled (CMS/[facility]) [E11.9] 09/15/2018   • History of Clostridium difficile colitis [Z86.19] 09/15/2018   • Anticoagulated [Z79.01] 09/15/2018      Resolved Hospital Problems    Diagnosis Date Noted Date Resolved   • Hyperkalemia [E87.5] 09/15/2018 09/18/2018     · fall and L1 fracture  · s/p T12-L2 POSTERIOR ARTHODESIS FUSION WITH MEDTRON[employer] NAVIGATION 9/17/18  · has brace, encourage to work with PT  · Follow-up Dr. Tyler 2 weeks  · anemia  · Continue to monitor for acute blood loss  · CAD  · afib  · eliquis resumed  · hyperkalemia  · resolved  · continue to monitor. K a little higher today (IVF has KCl will stop)  · DM 2, a1c 6.50  · Continue same for now  · trach/PEG  · he will continue follow-up with ENT outpatient  · NGB  · clarke, chronic  · ask urology to see d/t swelling  · check albumin, stop IVF  · COPD  · Hypothyroidism  · Seizure history  · disposition  · Home versus rehabilitation   · soon  · I discussed the patient's findings and my recommendations with patient, family and nursing staff     Zach Craven MD   09/21/18  3:14 PM

## 2018-09-21 NOTE — PROGRESS NOTES
[facility]  Phone: [phone]  Handy Vilchis MD    Subjective:  LOS: 5    I came to check on patient today.  He remains with a cuffed trach.  He has no respiratory symptoms other than secretions which requires suctioning.  He remains on trach collar.  He reports swelling of his penis.  He is very agitated about this.  He has a Chandler catheter.  The nurses also noticed abdominal distention.  He is on PEG tube feeding.    Objective   Vital Signs past 24hrs    Temp range: Temp (24hrs), Av.1 °F (36.7 °C), Min:97.6 °F (36.4 °C), Max:98.5 °F (36.9 °C)    BP range: BP: (134-147)/(72-92) 147/87  Pulse range: Heart Rate:  [74-95] 94  Resp rate range: Resp:  [16-20] 20    Device (Oxygen Therapy): tracheostomy collarFlow (L/min):  [6-15] 6  Oxygen range:SpO2:  [91 %-98 %] 95 %      81.5 kg (179 lb 10.8 oz); Body mass index is 26.52 kg/m².    Intake/Output Summary (Last 24 hours) at 18 1048  Last data filed at 18 0616   Gross per 24 hour   Intake             1900 ml   Output             2825 ml   Net             -925 ml       Physical Exam   Cardiovascular: Normal rate and regular rhythm.    No murmur heard.  Pulmonary/Chest: He has decreased breath sounds. He has no wheezes. He has no rales.   Trach- #6 cuffed Shiley   Abdominal: Soft. Bowel sounds are normal. He exhibits distension. There is no tenderness.   PEG   Genitourinary:   Genitourinary Comments: Swelling of the penis   Musculoskeletal: He exhibits no edema.   Neurological: He is alert.   Nursing note and vitals reviewed.    Results Review:    I have reviewed the laboratory and imaging data since the last note by LPC physician.  My annotations are noted in assessment and plan.    Medication Review:  I have reviewed the current MAR.  My annotations are noted in assessment and plan.      lactated ringers 9 mL/hr Last Rate: 9 mL/hr (18 1540)   sodium chloride 0.45 % with KCl 20 mEq 100 mL/hr Last Rate: 100 mL/hr (18 0501)      Plan   PCCM Problems  Chronic Tracheostomy with trach collar  Ex-smoker with possible underlying COPD  Chronic dysphagia with PEG feeding tube  Abdominal distention  Swelling of the penis  Relevant Medical Diagnoses  Lumbar compression fracture pending intervention  PAF  Diabetes  Chronic hypotension on midodrine    Plan of Treatment  Keep with cuffed trach for now.  He plans to see ENT as an outpatient.  They will address appropriate trach. I am also happy to switch it back to a cuff-less #6 Shiley if so required.    Continue nebs.    Abdominal distention noted.  Unclear why.  Ordered KUB.    Swelling of the penis noted.  He may require a urology consult.  Defer to primary team.    Handy Vilchis MD  09/21/18  10:48 AM    Part of this note may be an electronic transcription/translation of spoken language to printed text using the Dragon Dictation System.

## 2018-09-21 NOTE — THERAPY TREATMENT NOTE
Acute Care - Physical Therapy Treatment Note  [facility]     Patient Name: Lam Viramontes  : 1930  MRN: [id number]  Today's Date: 2018  Onset of Illness/Injury or Date of Surgery: 18  Date of Referral to PT: 18  Referring Physician: Nayeli    Admit Date: 2018    Visit Dx:    ICD-10-CM ICD-9-CM   1. Closed compression fracture of first lumbar vertebra, initial encounter (CMS/[facility]) S32.010A 805.4   2. UTI due to extended-spectrum beta lactamase (ESBL) producing Escherichia coli N39.0 599.0    A49.8 041.49    Z16.12 V09.1   3. Impaired functional mobility, balance, gait, and endurance Z74.09 V49.89     Patient Active Problem List   Diagnosis   • Chest pain (non-cardiac)   • Atrial fibrillation (CMS/[facility])   • C. difficile diarrhea   • Coronary artery disease   • Diabetes mellitus (CMS/[facility])   • Disease of thyroid gland   • Hypertension   • Pressure ulcer of sacral region   • Ankylosing spondylitis lumbar region (CMS/[facility])   • Neurogenic bladder due to ankylosing spondylitis   • Tracheostomy in place (CMS/[facility])   • PEG (percutaneous endoscopic gastrostomy) status (CMS/[facility])   • Acute UTI   • Sepsis (CMS/[facility])   • Clostridium difficile colitis   • Anticoagulated   • Closed compression fracture of L1 lumbar vertebra (CMS/[facility])   • Tracheostomy status (CMS/[facility])   • Seizures (CMS/[facility])   • PAF (paroxysmal atrial fibrillation) (CMS/[facility])   • Neuromuscular dysfunction of bladder   • Hypothyroidism   • Gastrostomy tube dependent (CMS/[facility])   • COPD (chronic obstructive pulmonary disease) (CMS/[facility])   • CAD (coronary artery disease)   • AV block, 1st degree   • Bifascicular block   • Macrocytic anemia   • Diabetes type 2, controlled (CMS/[facility])   • History of Clostridium difficile colitis   • Anticoagulated       Therapy Treatment          Rehabilitation Treatment Summary     Row Name 18 1510             Treatment Time/Intention    Discipline physical therapy assistant  -      Document Type therapy  note (daily note)  -SM      Subjective Information complains of;weakness;pain  -SM      Mode of Treatment physical therapy  -SM      Patient Effort fair  -SM      Existing Precautions/Restrictions fall;oxygen therapy device and L/min;brace worn when out of bed   trach  -SM      Recorded by [] Brittnee Griffiths, [facility] 09/21/18 1555      Row Name 09/21/18 1510             Cognitive Assessment/Intervention    Additional Documentation Cognitive Assessment/Intervention (Group)  -SM      Recorded by [] Brittnee Griffiths [facility] 09/21/18 1555      Row Name 09/21/18 1510             Cognitive Assessment/Intervention- PT/OT    Orientation Status (Cognition) oriented x 3  -SM      Follows Commands (Cognition) follows one step commands;repetition of directions required  -SM      Safety Deficit (Cognitive) mild deficit  -SM      Personal Safety Interventions fall prevention program maintained;gait belt;nonskid shoes/slippers when out of bed  -SM      Recorded by [SM] Brittnee Griffiths [facility] 09/21/18 1555      Row Name 09/21/18 1510             Bed Mobility Assessment/Treatment    Bed Mobility Assessment/Treatment supine-sit;sit-supine  -SM      Supine-Sit [place] (Bed Mobility) minimum assist (75% patient effort);2 person assist  -SM      Sit-Supine [place] (Bed Mobility) moderate assist (50% patient effort);2 person assist  -SM      Bed Mobility, Safety Issues decreased use of legs for bridging/pushing  -SM      Assistive Device (Bed Mobility) bed rails;head of bed elevated  -      Comment (Bed Mobility) log roll  -SM      Recorded by [SM] Brittnee Griffiths, [facility] 09/21/18 1555      Row Name 09/21/18 1510             Transfer Assessment/Treatment    Transfer Assessment/Treatment sit-stand transfer;stand-sit transfer  -SM      Comment (Transfers) 3x  -SM      Recorded by [SM] Brittnee Griffiths [facility] 09/21/18 1555      Row Name 09/21/18 1510             Sit-Stand Transfer    Sit-Stand [place] (Transfers)  minimum assist (75% patient effort)  -      Assistive Device (Sit-Stand Transfers) walker, front-wheeled  -      Recorded by [] Brittnee Griffiths [facility] 09/21/18 1555      Row Name 09/21/18 1510             Stand-Sit Transfer    Stand-Sit [place] (Transfers) minimum assist (75% patient effort)  -      Assistive Device (Stand-Sit Transfers) walker, front-wheeled  -SM      Recorded by [] Brittnee Griffiths [facility] 09/21/18 1555      Row Name 09/21/18 1510             Gait/Stairs Assessment/Training    Comment (Gait/Stairs) pt too weak after attempting to take 1 step forward  -      Recorded by [] Brittnee Griffiths [facility] 09/21/18 1555      Row Name 09/21/18 1510             Positioning and Restraints    Pre-Treatment Position in bed  -SM      Post Treatment Position bed  -SM      In Bed fowlers;call light within reach;encouraged to call for assist;with family/caregiver;notified nsg  -      Recorded by [] Brittnee Griffiths [facility] 09/21/18 1555      Row Name 09/21/18 1510             Pain Assessment    Additional Documentation Pain Scale: Numbers Pre/Post-Treatment (Group)  -      Recorded by [] Brittnee Griffiths [facility] 09/21/18 1555      Row Name 09/21/18 1510             Pain Scale: Numbers Pre/Post-Treatment    Pain Scale: Numbers, Pretreatment 9/10  -SM      Pain Scale: Numbers, Post-Treatment 9/10  -SM      Pain Location back  -SM      Pain Intervention(s) Repositioned;Rest  -      Recorded by [] Brittnee Griffiths [facility] 09/21/18 1555      Row Name                Wound 09/15/18 0021 Bilateral coccyx pressure injury    Wound - Properties Group Date first assessed: 09/15/18 [PB] Time first assessed: 0021 [PB] Present On Admission : picture taken [PB] Side: Bilateral [PB] Location: coccyx [PB] Type: pressure injury [PB] Stage, Pressure Injury: Stage 1 [PB] Recorded by:  [PB] Emely Mcqueen RN 09/15/18 0123    Row Name                Wound 09/17/18 1657 Other (See comments) back incision     Wound - Properties Group Date first assessed: 09/17/18 [JT] Time first assessed: 1657 [JT] Side: Other (See comments) [JT] Location: back [JT] Type: incision [JT] Recorded by:  [ELAINA] Altagracia Bob RN 09/17/18 1657      User Key  (r) = Recorded By, (t) = Taken By, (c) = Cosigned By    Initials Name Effective Dates Discipline    PB Emely Mcqueen RN 06/16/16 -  Nurse    Altagracia Ledesma RN 06/16/16 -  Nurse    CLEVE Griffiths Brittnee Thomas, PTA 03/07/18 -  PT          Wound 09/15/18 0021 Bilateral coccyx pressure injury (Active)   Dressing Appearance open to air 9/21/2018  2:15 PM   Closure None;Open to air 9/21/2018  2:15 PM   Base pink 9/21/2018  2:15 PM   Dressing Care, Wound open to air 9/21/2018  8:38 AM       Wound 09/17/18 1657 Other (See comments) back incision (Active)   Dressing Appearance intact;dry 9/21/2018  2:15 PM   Closure Adhesive bandage;Staples 9/21/2018  2:15 PM             Physical Therapy Education     Title: PT OT SLP Therapies (Done)     Topic: Physical Therapy (Done)     Point: Mobility training (Done)    Learning Progress Summary     Learner Status Readiness Method Response Comment Documented by    Patient Done Acceptance E,D LUCIA,[facility] 09/21/18 1556     Done Acceptance E [facility] 09/19/18 0942     Done Acceptance E [facility] 09/18/18 0952          Point: Home exercise program (Done)    Learning Progress Summary     Learner Status Readiness Method Response Comment Documented by    Patient Done Acceptance E,D LUCIA,[facility] 09/21/18 1556     Done Acceptance E [facility] 09/19/18 0942     Done Acceptance E [facility] 09/18/18 0952          Point: Body mechanics (Done)    Learning Progress Summary     Learner Status Readiness Method Response Comment Documented by    Patient Done Acceptance E,D LUCIA,[facility] 09/21/18 1556     Done Acceptance E ,[facility] 09/19/18 0942     Done Acceptance E [facility] 09/18/18 0952          Point: Precautions (Done)    Learning Progress Summary     Learner Status  Readiness Method Response Comment Documented by    Patient Done Acceptance E,D VENICE MYERS   09/21/18 1556     Done Acceptance E VU,NR   09/19/18 0942     Done Acceptance E VU,NR   09/18/18 0952                      User Key     Initials Effective Dates Name Provider Type Discipline     04/03/18 -  Elsy Wilson, PT Physical Therapist PT     03/07/18 -  Brittnee Griffiths, [place] Physical Therapy Assistant PT                    PT Recommendation and Plan     Plan of Care Reviewed With: patient  Progress: no change  Outcome Summary: Pt tolerated treatment fair this date. States he has 9/10 pain in his back. Required min A to stand 3x, though too weak to attempt taking steps forward. Pt also stating he couldn't breathe, and nsg was present to clear trach. PT will continue to address functional mobility deficits as tolerated.          Outcome Measures     Row Name 09/21/18 1500 09/20/18 1100 09/19/18 0900       How much help from another person do you currently need...    Turning from your back to your side while in flat bed without using bedrails? 3  - 3  - 2  -LH    Moving from lying on back to sitting on the side of a flat bed without bedrails? 3  - 3  - 2  -LH    Moving to and from a bed to a chair (including a wheelchair)? 2  - 3  - 2  -LH    Standing up from a chair using your arms (e.g., wheelchair, bedside chair)? 3  - 3  - 2  -LH    Climbing 3-5 steps with a railing? 1  - 2  - 2  -    To walk in hospital room? 2  - 2  - 2  -    AM-PAC 6 Clicks Score 14  - 16  - 12  -LH       Functional Assessment    Outcome Measure Options AM-PAC 6 Clicks Basic Mobility (PT)  - AM-PAC 6 Clicks Basic Mobility (PT)  - AM-PAC 6 Clicks Basic Mobility (PT)  -      User Key  (r) = Recorded By, (t) = Taken By, (c) = Cosigned By    Initials Name Provider Type     Elsy Wilson, PT Physical Therapist     Brittnee Griffiths, [place] Physical Therapy Assistant           Time Calculation:          PT Charges     Row Name 09/21/18 1558 09/21/18 0951          Time Calculation    Start Time 1510  -  --     Stop Time 1538  -  --     Time Calculation (min) 28 min  -  --     PT Received On 09/21/18  -  --     PT - Next Appointment 09/22/18  - 09/21/18  -       User Key  (r) = Recorded By, (t) = Taken By, (c) = Cosigned By    Initials Name Provider Type     Brittnee Griffiths PTA Physical Therapy Assistant        Therapy Suggested Charges     Code   Minutes Charges    None           Therapy Charges for Today     Code Description Service Date Service Provider Modifiers Qty    85783628843 HC PT THER PROC EA 15 MIN 9/21/2018 Brittnee Griffiths PTA GP 2    05327224646 HC PT THER SUPP EA 15 MIN 9/21/2018 Brittnee Griffiths PTA GP 1          PT G-Codes  Outcome Measure Options: AM-PAC 6 Clicks Basic Mobility (PT)  AM-PAC 6 Clicks Score: 14    Brittnee Griffiths PTA  9/21/2018

## 2018-09-21 NOTE — DISCHARGE PLACEMENT REQUEST
"Gino Viramontes (88 y.o. 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)   Code Status:  CPR    Ht:  175.3 cm (69.02\")   Wt:  81.5 kg (179 lb 10.8 oz)    Admission Cmt:  None   Principal Problem:  Closed compression fracture of L1 lumbar vertebra (CMS/[facility]) [S32.010A]                 Active Insurance as of 9/14/2018     Primary Coverage     Payor Plan Insurance Group Employer/Plan Group    [facility] MEDICARE REPLACEMENT [place] [id number]     Payor Plan Address Payor Plan Phone Number Effective From Effective To    [address] [address]  3/1/2017     [facility] [id number]       Subscriber Name Subscriber Birth Date Member ID       GINO VRIAMONTES 9/2/1930 [id number]                 Emergency Contacts      (Rel.) Home Phone Work Phone Mobile Phone    ButlerRola rubio (Spouse) [phone] -- --              "

## 2018-09-21 NOTE — PLAN OF CARE
Problem: Patient Care Overview  Goal: Plan of Care Review  Outcome: Ongoing (interventions implemented as appropriate)   09/21/18 2614   Coping/Psychosocial   Plan of Care Reviewed With patient   Plan of Care Review   Progress no change   OTHER   Outcome Summary Pt tolerated treatment fair this date. States he has 9/10 pain in his back. Required min A to stand 3x, though too weak to attempt taking steps forward. Pt also stating he couldn't breathe, and nsg was present to clear trach. PT will continue to address functional mobility deficits as tolerated.

## 2018-09-21 NOTE — PLAN OF CARE
Problem: Fall Risk (Adult)  Goal: Absence of Fall  Outcome: Ongoing (interventions implemented as appropriate)      Problem: Skin Injury Risk (Adult)  Goal: Skin Health and Integrity  Outcome: Ongoing (interventions implemented as appropriate)      Problem: Infection, Risk/Actual (Adult)  Goal: Infection Prevention/Resolution  Outcome: Ongoing (interventions implemented as appropriate)      Problem: Airway, Artificial (Adult)  Goal: Signs and Symptoms of Listed Potential Problems Will be Absent, Minimized or Managed (Airway, Artificial)  Outcome: Ongoing (interventions implemented as appropriate)      Problem: Pain, Acute (Adult)  Goal: Acceptable Pain Control/Comfort Level  Outcome: Ongoing (interventions implemented as appropriate)      Problem: Patient Care Overview  Goal: Plan of Care Review  Outcome: Ongoing (interventions implemented as appropriate)   09/21/18 1587   Coping/Psychosocial   Plan of Care Reviewed With patient   OTHER   Outcome Summary Obessive behavior regarding suctioning, pt wants to be suctioned constantly. Teaching provided about suctioning and pt continues to request suctioning obsessively. Dilaudid given IV for pain around 2 am and pt slept most of the night after it was given.

## 2018-09-21 NOTE — SIGNIFICANT NOTE
09/21/18 0951   Rehab Treatment   Discipline physical therapy assistant   Reason Treatment Not Performed patient/family declined treatment, not feeling well  (pt declined stating he needed another 45min; resisting when therapist attempted to take blanket off; OKSANA Herron, notified; PT to follow up in PM)   Recommendation   PT - Next Appointment 09/21/18

## 2018-09-21 NOTE — CONSULTS
FIRST UROLOGY CONSULT      Patient Identification:  NAME:  Lam Viramontes  Age:  88 y.o.   Sex:  male   :  1930   MRN:  [id number]       Chief complaint: Penile pain    History of present illness:  80-year-old gentleman bedridden multiple health issues the trach has an indwelling catheter that appears to be chronic and was just changed earlier this month and our office by my partner.  He is having penile pain and has noted swelling we then asked see the patient for evaluation.  He is currently in isolation.  No gross hematuria.      Past medical history:  Past Medical History:   Diagnosis Date   • Ankylosing spondylitis lumbar region (CMS/HCC)    • BPH (benign prostatic hyperplasia)    • C. difficile diarrhea    • Constipation    • Coronary artery disease    • Diabetes mellitus (CMS/HCC)    • Dysphagia    • Hyperlipidemia    • Hypertension    • Hypothyroidism    • MRSA infection    • Neuromuscular dysfunction of bladder    • PAF (paroxysmal atrial fibrillation) (CMS/HCC)    • Pain    • Pressure ulcer of sacral region    • Seizures (CMS/HCC)    • Tracheostomy status (CMS/[facility])        Past surgical history:  Past Surgical History:   Procedure Laterality Date   • CARDIAC SURGERY     • CORONARY ARTERY BYPASS GRAFT      x 5   • GTUBE INSERTION     • LUMBAR DISCECTOMY FUSION INSTRUMENTATION N/A 2018    Procedure: T12-L2 POSTERIOR ARTHODESIS FUSION WITH MEDTRONIC NAVIGATION;  Surgeon: Jose Daniel Tyler MD;  Location: [facility];  Service: Neurosurgery   • TRACHEOSTOMY         Allergies:  Patient has no known allergies.    Home medications:  Prescriptions Prior to Admission   Medication Sig Dispense Refill Last Dose   • albuterol (PROVENTIL) (5 MG/ML) 0.5% nebulizer solution Take 2.5 mg by nebulization Every 4 (Four) Hours As Needed for Wheezing or Shortness of Air.   Taking   • albuterol (PROVENTIL) (5 MG/ML) 0.5% nebulizer solution Take 5 mg by nebulization 2 (Two) Times a Day.   Taking   •  apixaban (ELIQUIS) 5 MG tablet tablet 5 mg by Per G Tube route 2 (Two) Times a Day.   Taking   • atorvastatin (LIPITOR) 40 MG tablet 40 mg by Per G Tube route Daily.   Taking   • budesonide (PULMICORT) 0.5 MG/2ML nebulizer solution Take 0.5 mg by nebulization 2 (Two) Times a Day.   Taking   • finasteride (PROSCAR) 5 MG tablet 5 mg by Per G Tube route Daily.   Taking   • lacosamide (VIMPAT) 50 MG tablet tablet 100 mg by Per G Tube route Every 12 (Twelve) Hours.   Taking   • levETIRAcetam (KEPPRA) 750 MG tablet 1,500 mg by Per G Tube route 2 (Two) Times a Day.   Taking   • levothyroxine (SYNTHROID, LEVOTHROID) 75 MCG tablet 75 mcg by Per G Tube route Daily.   Taking   • [] linezolid (ZYVOX) 600 MG tablet 600 mg by Per G Tube route 2 (Two) Times a Day.      • midodrine (PROAMATINE) 10 MG tablet 10 mg by Per G Tube route 3 (Three) Times a Day.      • sitaGLIPtin-metFORMIN (JANUMET)  MG per tablet 1 tablet by Per G Tube route 2 (Two) Times a Day With Meals.   Taking   • tiotropium (SPIRIVA) 18 MCG per inhalation capsule Place 1 capsule into inhaler and inhale Daily.   Taking        Hospital medications:    albuterol 2.5 mg Nebulization BID   apixaban 5 mg Oral Q12H   atorvastatin 40 mg Per G Tube Daily   budesonide 0.5 mg Nebulization BID - RT   docusate sodium 100 mg Oral Daily   finasteride 5 mg Oral Daily   insulin aspart 0-9 Units Subcutaneous 4x Daily With Meals & Nightly   ipratropium-albuterol 3 mL Nebulization 4x Daily - RT   lacosamide 100 mg Per G Tube Q12H   levETIRAcetam 1,500 mg Per G Tube Q12H   levothyroxine 75 mcg Per G Tube Q AM   midodrine 10 mg Per G Tube TID   mupirocin  Each Nare BID   polyethylene glycol 17 g Oral BID       lactated ringers 9 mL/hr Last Rate: 9 mL/hr (18 1540)     •  acetaminophen  •  acetaminophen  •  albuterol  •  bisacodyl  •  bisacodyl  •  bisacodyl  •  dextrose  •  dextrose  •  docusate sodium  •  glucagon (human recombinant)  •  HYDROcodone-acetaminophen  •   HYDROcodone-acetaminophen  •  HYDROmorphone **AND** naloxone  •  magnesium hydroxide  •  melatonin  •  ondansetron **OR** ondansetron ODT **OR** ondansetron  •  ondansetron  •  sodium chloride  •  sodium chloride  •  sodium chloride  •  Insert peripheral IV **AND** sodium chloride    Family history:  History reviewed. No pertinent family history.    Social history:  Social History   Substance Use Topics   • Smoking status: Former Smoker   • Smokeless tobacco: Never Used   • Alcohol use No       Review of systems:    Negative 12-system ROS except for the following:  Unable to elicit patient has a tracheostomy      Objective:  TMax 24 hours:   Temp (24hrs), Av.2 °F (36.8 °C), Min:97.7 °F (36.5 °C), Max:98.5 °F (36.9 °C)      Vitals Ranges:   Temp:  [97.7 °F (36.5 °C)-98.5 °F (36.9 °C)] 97.7 °F (36.5 °C)  Heart Rate:  [] 98  Resp:  [18-24] 20  BP: (134-147)/(75-92) 136/75    Intake/Output Last 3 shifts:  I/O last 3 completed shifts:  In: 6084 [I.V.:3314; Other:1330; NG/GT:1440]  Out: 4825 [Urine:4825]     Physical Exam:       General Appearance:    Alert, cooperative, in no acute distress   Head:    Normocephalic, without obvious abnormality, atraumatic   Eyes:          PERRL, conjunctivae and corneas clear   Ears:    Normal external inspection   Throat:   No oral lesions, oral mucosa moist   Neck:   Supple, no LAD, trachea midline   Back:     No CVA tenderness   Lungs:     Respirations unlabored, symmetric excursion    Heart:    RRR, intact peripheral pulses   Abdomen:     Soft, NDNT, no masses, no guarding   :    Testes descended bilaterally, no nodules, no mass. Epididymi normal.  Penis with paraphimosis     No scrotal or penile rashes noted.   RAIELLE:  Extremities:   Deferred.  No edema, no deformity   Skin:   No bleeding, bruising or rashes   Neuro/Psych:   Orientation intact, mood/affect pleasant, no focal findings       Results review:   I reviewed the patient's new clinical results.    Data  review:  Lab Results (last 24 hours)     Procedure Component Value Units Date/Time    POC Glucose Once [[id number]]  (Normal) Collected:  09/21/18 1611    Specimen:  Blood Updated:  09/21/18 1612     Glucose 119 mg/dL     Narrative:       Meter: [id number] : [id number] Harry RIOJAS    Hepatic Function Panel [[id number]]  (Abnormal) Collected:  09/21/18 0453    Specimen:  Blood Updated:  09/21/18 1604     Total Protein 6.8 g/dL      Albumin 2.80 (L) g/dL      ALT (SGPT) 35 U/L      AST (SGOT) 31 U/L      Alkaline Phosphatase 123 (H) U/L      Total Bilirubin 0.4 mg/dL      Bilirubin, Direct <0.2 mg/dL      Bilirubin, Indirect -- mg/dL      Comment: Unable to calculate       POC Glucose Once [[id number]]  (Abnormal) Collected:  09/21/18 1127    Specimen:  Blood Updated:  09/21/18 1132     Glucose 245 (H) mg/dL     Narrative:       Meter: [id number] : [id number] Harry RIOJAS    Basic Metabolic Panel [[id number]]  (Abnormal) Collected:  09/21/18 0453    Specimen:  Blood Updated:  09/21/18 0551     Glucose 115 (H) mg/dL      BUN 19 mg/dL      Creatinine 0.76 mg/dL      Sodium 136 mmol/L      Potassium 5.2 mmol/L      Chloride 98 mmol/L      CO2 30.8 (H) mmol/L      Calcium 9.1 mg/dL      eGFR Non African Amer 97 mL/min/1.73      BUN/Creatinine Ratio 25.0     Anion Gap 7.2 mmol/L     Narrative:       The MDRD GFR formula is only valid for adults with stable renal function between ages 18 and 70.    POC Glucose Once [[id number]]  (Abnormal) Collected:  09/21/18 0525    Specimen:  Blood Updated:  09/21/18 0526     Glucose 141 (H) mg/dL     Narrative:       Meter: [id number] : [id number] Raman Curry CNA    Hemoglobin & Hematocrit, Blood [[id number]]  (Abnormal) Collected:  09/20/18 2353    Specimen:  Blood Updated:  09/21/18 0012     Hemoglobin 9.6 (L) g/dL      Hematocrit 30.7 (L) %     POC Glucose Once [[id number]]  (Abnormal) Collected:  09/20/18 2043    Specimen:  Blood Updated:  09/20/18 2046     Glucose 168  (H) mg/dL     Narrative:       Meter: [id number] : [id number] Raman Curry CNA           Imaging:  Imaging Results (last 24 hours)     Procedure Component Value Units Date/Time    XR Abdomen KUB [[id number]] Updated:  09/21/18 1714    XR Spine Lumbar 4+ View [[id number]] Collected:  09/20/18 2105     Updated:  09/21/18 0903    Narrative:       X-RAY LUMBAR SPINE 4 VIEWS.     HISTORY: Fusion.     COMPARISON: No prior studies for comparison.     FINDINGS:  Under fluoroscopy, posterior fusion was performed through the articular  screws and rods.         Soft tissue detail is suboptimal.        Impression:       Posterior fusion under fluoroscopy guidance.     Fluoroscopy time is 16 seconds.  Number of images is 388.         This report was finalized on 9/21/2018 2:45 AM by Dr. Elaine Oconnell M.D.       FL O Arm During Surgery [[id number]] Collected:  09/20/18 2105     Updated:  09/21/18 0903    Narrative:       X-RAY LUMBAR SPINE 4 VIEWS.     HISTORY: Fusion.     COMPARISON: No prior studies for comparison.     FINDINGS:  Under fluoroscopy, posterior fusion was performed through the articular  screws and rods.         Soft tissue detail is suboptimal.        Impression:       Posterior fusion under fluoroscopy guidance.     Fluoroscopy time is 16 seconds.  Number of images is 388.         This report was finalized on 9/21/2018 2:45 AM by Dr. Elaine Oconnell M.D.                Assessment:     Principal Problem:    Closed compression fracture of L1 lumbar vertebra (CMS/HCC)  Active Problems:    Tracheostomy status (CMS/HCC)    Seizures (CMS/HCC)    PAF (paroxysmal atrial fibrillation) (CMS/HCC)    Neuromuscular dysfunction of bladder    Hypothyroidism    Gastrostomy tube dependent (CMS/HCC)    COPD (chronic obstructive pulmonary disease) (CMS/HCC)    CAD (coronary artery disease)    AV block, 1st degree    Bifascicular block    Macrocytic anemia    Diabetes type 2, controlled (CMS/HCC)    History of Clostridium  difficile colitis    Anticoagulated    Paraphimosis  Chronic indwelling catheter    Plan:     I reduce his paraphimosis at the bedside and will do local penile care with Neosporin and keeping his foreskin reduced over the head of his penis and change his catheter every 4 weeks as planned.  Call if needed.    Willy Quintero MD  09/21/18  6:30 PM

## 2018-09-21 NOTE — PROGRESS NOTES
Continued Stay Note  [facility]     Patient Name: Lam Viramontes  MRN: [id number]  Today's Date: 9/21/2018    Admit Date: 9/14/2018          Discharge Plan     Row Name 09/21/18 1639       Plan    Plan Pt has been approved for bed @ [facility] and they can accept over weekend.  Wife is still thinking she may take him home with  HH.  They will make decision at time of dc.    Plan Comments [facility] spoke with pt and wife several times today regarding home with HH and pd help vs skilled, at times they agreed to skilled, then would change their mind's.  They did say they would want [facility] if they go to SNU.  [facility] has bed approved @  if pt decides to go.  [facility] cont to follow it pt return home. Tania Laureano RN              Discharge Codes    No documentation.           Tania Laureano RN

## 2018-09-21 NOTE — PROGRESS NOTES
LOS: 5 days   Patient Care Team:  Suman Jaimes III, MD as PCP - General (Internal Medicine)    Chief Complaint: Incisional pain    Subjective     History of Present Illness  Patient to describe incisional pain but states preoperative pain is better.  He was sitting in the chair with his brace on yesterday.  No new weakness or complaints.  Subjective    History taken from: patient    Objective     Vital Signs  Temp:  [97.6 °F (36.4 °C)-98.5 °F (36.9 °C)] 98.4 °F (36.9 °C)  Heart Rate:  [74-97] 94  Resp:  [16-20] 20  BP: (134-147)/(72-92) 147/87    Objective    Results Review:     I reviewed the patient's new clinical results.  Standing thoracic films demonstrate good alignment and hardware placement  Medication Review: Reviewed    Assessment/Plan   Continue to mobilize and possible to rehabilitation today  For pain management regimen  Follow up 2 weeks  Principal Problem:    Closed compression fracture of L1 lumbar vertebra (CMS/HCC)  Active Problems:    Tracheostomy status (CMS/HCC)    Seizures (CMS/HCC)    PAF (paroxysmal atrial fibrillation) (CMS/HCC)    Neuromuscular dysfunction of bladder    Hypothyroidism    Gastrostomy tube dependent (CMS/HCC)    COPD (chronic obstructive pulmonary disease) (CMS/HCC)    CAD (coronary artery disease)    AV block, 1st degree    Bifascicular block    Macrocytic anemia    Diabetes type 2, controlled (CMS/[facility])    History of Clostridium difficile colitis    Anticoagulated      Assessment & Plan    Jose Daniel Tyler MD  09/21/18  9:00 AM    Time: More than 50% of time spent in counseling and coordination of care:  Total face-to-face/floor time 15 min.  Time spent in counseling 10 min. Counseling included the following topics: Pathology and treatment

## 2018-09-22 LAB
ANION GAP SERPL CALCULATED.3IONS-SCNC: 7.3 MMOL/L
BUN BLD-MCNC: 16 MG/DL (ref 8–23)
BUN/CREAT SERPL: 22.2 (ref 7–25)
CALCIUM SPEC-SCNC: 9.2 MG/DL (ref 8.6–10.5)
CHLORIDE SERPL-SCNC: 97 MMOL/L (ref 98–107)
CO2 SERPL-SCNC: 30.7 MMOL/L (ref 22–29)
CREAT BLD-MCNC: 0.72 MG/DL (ref 0.76–1.27)
DEPRECATED RDW RBC AUTO: 49 FL (ref 37–54)
ERYTHROCYTE [DISTWIDTH] IN BLOOD BY AUTOMATED COUNT: 12.9 % (ref 11.5–14.5)
GFR SERPL CREATININE-BSD FRML MDRD: 103 ML/MIN/1.73
GLUCOSE BLD-MCNC: 120 MG/DL (ref 65–99)
GLUCOSE BLDC GLUCOMTR-MCNC: 127 MG/DL (ref 70–130)
GLUCOSE BLDC GLUCOMTR-MCNC: 182 MG/DL (ref 70–130)
GLUCOSE BLDC GLUCOMTR-MCNC: 251 MG/DL (ref 70–130)
GLUCOSE BLDC GLUCOMTR-MCNC: 263 MG/DL (ref 70–130)
HCT VFR BLD AUTO: 30.7 % (ref 40.4–52.2)
HGB BLD-MCNC: 9.5 G/DL (ref 13.7–17.6)
MCH RBC QN AUTO: 32.3 PG (ref 27–32.7)
MCHC RBC AUTO-ENTMCNC: 30.9 G/DL (ref 32.6–36.4)
MCV RBC AUTO: 104.4 FL (ref 79.8–96.2)
PLATELET # BLD AUTO: 211 10*3/MM3 (ref 140–500)
PMV BLD AUTO: 10.4 FL (ref 6–12)
POTASSIUM BLD-SCNC: 4.6 MMOL/L (ref 3.5–5.2)
RBC # BLD AUTO: 2.94 10*6/MM3 (ref 4.6–6)
SODIUM BLD-SCNC: 135 MMOL/L (ref 136–145)
WBC NRBC COR # BLD: 5.92 10*3/MM3 (ref 4.5–10.7)

## 2018-09-22 PROCEDURE — 94799 UNLISTED PULMONARY SVC/PX: CPT

## 2018-09-22 PROCEDURE — 93010 ELECTROCARDIOGRAM REPORT: CPT | Performed by: INTERNAL MEDICINE

## 2018-09-22 PROCEDURE — 63710000001 INSULIN ASPART PER 5 UNITS: Performed by: HOSPITALIST

## 2018-09-22 PROCEDURE — 82962 GLUCOSE BLOOD TEST: CPT

## 2018-09-22 PROCEDURE — 85027 COMPLETE CBC AUTOMATED: CPT | Performed by: HOSPITALIST

## 2018-09-22 PROCEDURE — 80048 BASIC METABOLIC PNL TOTAL CA: CPT | Performed by: HOSPITALIST

## 2018-09-22 PROCEDURE — 97110 THERAPEUTIC EXERCISES: CPT

## 2018-09-22 PROCEDURE — 93005 ELECTROCARDIOGRAM TRACING: CPT | Performed by: NURSE PRACTITIONER

## 2018-09-22 PROCEDURE — 25010000002 HYDROMORPHONE PER 4 MG: Performed by: NEUROLOGICAL SURGERY

## 2018-09-22 RX ORDER — MAGNESIUM CARB/ALUMINUM HYDROX 105-160MG
296 TABLET,CHEWABLE ORAL ONCE
Status: COMPLETED | OUTPATIENT
Start: 2018-09-22 | End: 2018-09-22

## 2018-09-22 RX ADMIN — LACOSAMIDE 100 MG: 100 TABLET, FILM COATED ORAL at 08:52

## 2018-09-22 RX ADMIN — MIDODRINE HYDROCHLORIDE 10 MG: 5 TABLET ORAL at 08:52

## 2018-09-22 RX ADMIN — ALBUTEROL SULFATE 2.5 MG: 2.5 SOLUTION RESPIRATORY (INHALATION) at 20:59

## 2018-09-22 RX ADMIN — FINASTERIDE 5 MG: 5 TABLET, FILM COATED ORAL at 08:53

## 2018-09-22 RX ADMIN — IPRATROPIUM BROMIDE AND ALBUTEROL SULFATE 3 ML: .5; 3 SOLUTION RESPIRATORY (INHALATION) at 10:27

## 2018-09-22 RX ADMIN — BUDESONIDE 0.5 MG: 0.5 INHALANT RESPIRATORY (INHALATION) at 21:00

## 2018-09-22 RX ADMIN — INSULIN ASPART 6 UNITS: 100 INJECTION, SOLUTION INTRAVENOUS; SUBCUTANEOUS at 11:35

## 2018-09-22 RX ADMIN — BUDESONIDE 0.5 MG: 0.5 INHALANT RESPIRATORY (INHALATION) at 06:28

## 2018-09-22 RX ADMIN — POLYETHYLENE GLYCOL 3350 17 G: 17 POWDER, FOR SOLUTION ORAL at 08:52

## 2018-09-22 RX ADMIN — APIXABAN 5 MG: 5 TABLET, FILM COATED ORAL at 20:12

## 2018-09-22 RX ADMIN — POLYETHYLENE GLYCOL 3350 17 G: 17 POWDER, FOR SOLUTION ORAL at 20:12

## 2018-09-22 RX ADMIN — MIDODRINE HYDROCHLORIDE 10 MG: 5 TABLET ORAL at 20:12

## 2018-09-22 RX ADMIN — LEVETIRACETAM 1500 MG: 100 SOLUTION ORAL at 20:12

## 2018-09-22 RX ADMIN — APIXABAN 5 MG: 5 TABLET, FILM COATED ORAL at 08:52

## 2018-09-22 RX ADMIN — MUPIROCIN 10 APPLICATION: 20 OINTMENT TOPICAL at 20:12

## 2018-09-22 RX ADMIN — INSULIN ASPART 6 UNITS: 100 INJECTION, SOLUTION INTRAVENOUS; SUBCUTANEOUS at 17:51

## 2018-09-22 RX ADMIN — Medication 296 ML: at 15:15

## 2018-09-22 RX ADMIN — HYDROMORPHONE HYDROCHLORIDE 0.5 MG: 1 INJECTION, SOLUTION INTRAMUSCULAR; INTRAVENOUS; SUBCUTANEOUS at 03:37

## 2018-09-22 RX ADMIN — MUPIROCIN 10 APPLICATION: 20 OINTMENT TOPICAL at 00:51

## 2018-09-22 RX ADMIN — IPRATROPIUM BROMIDE AND ALBUTEROL SULFATE 3 ML: .5; 3 SOLUTION RESPIRATORY (INHALATION) at 14:54

## 2018-09-22 RX ADMIN — MUPIROCIN 1 APPLICATION: 20 OINTMENT TOPICAL at 08:53

## 2018-09-22 RX ADMIN — MIDODRINE HYDROCHLORIDE 10 MG: 5 TABLET ORAL at 17:51

## 2018-09-22 RX ADMIN — IPRATROPIUM BROMIDE AND ALBUTEROL SULFATE 3 ML: .5; 3 SOLUTION RESPIRATORY (INHALATION) at 06:28

## 2018-09-22 RX ADMIN — DOCUSATE SODIUM 100 MG: 50 LIQUID ORAL at 08:52

## 2018-09-22 RX ADMIN — LACOSAMIDE 100 MG: 100 TABLET, FILM COATED ORAL at 20:12

## 2018-09-22 RX ADMIN — ATORVASTATIN CALCIUM 40 MG: 20 TABLET, FILM COATED ORAL at 08:52

## 2018-09-22 RX ADMIN — LEVOTHYROXINE SODIUM 75 MCG: 75 TABLET ORAL at 08:52

## 2018-09-22 RX ADMIN — LEVETIRACETAM 1500 MG: 100 SOLUTION ORAL at 08:52

## 2018-09-22 NOTE — PLAN OF CARE
Problem: Fall Risk (Adult)  Goal: Absence of Fall  Outcome: Ongoing (interventions implemented as appropriate)      Problem: Skin Injury Risk (Adult)  Goal: Skin Health and Integrity  Outcome: Ongoing (interventions implemented as appropriate)      Problem: Infection, Risk/Actual (Adult)  Goal: Infection Prevention/Resolution  Outcome: Ongoing (interventions implemented as appropriate)      Problem: Airway, Artificial (Adult)  Goal: Signs and Symptoms of Listed Potential Problems Will be Absent, Minimized or Managed (Airway, Artificial)  Outcome: Ongoing (interventions implemented as appropriate)      Problem: Pain, Acute (Adult)  Goal: Acceptable Pain Control/Comfort Level  Outcome: Ongoing (interventions implemented as appropriate)      Problem: Seizure Disorder/Epilepsy (Adult)  Goal: Signs and Symptoms of Listed Potential Problems Will be Absent, Minimized or Managed (Seizure Disorder/Epilepsy)  Outcome: Ongoing (interventions implemented as appropriate)      Problem: Patient Care Overview  Goal: Plan of Care Review  Outcome: Ongoing (interventions implemented as appropriate)   09/22/18 0612   Coping/Psychosocial   Plan of Care Reviewed With patient   Plan of Care Review   Progress no change   OTHER   Outcome Summary Pt called out to be suctioned frequently during the shift, pt was educated on trach suctioning and risk of infection but needs reinforcement , he took out inner cannula from the trach 3x, trach care done prior replacement, charge nurse on night was informed who came in to pt's room & talked with him not to do so due to risk of infection, pt was alert & oriented x3 but very obsessive with suctioning frequency, Dr Suzanne SANFORD was also informed, no new orders given, he was medicated as ordered & tolerated well, I will continue to monitor.     Goal: Discharge Needs Assessment  Outcome: Ongoing (interventions implemented as appropriate)    Goal: Interprofessional Rounds/Family Conf  Outcome: Ongoing  (interventions implemented as appropriate)

## 2018-09-22 NOTE — PLAN OF CARE
Problem: Fall Risk (Adult)  Goal: Absence of Fall  Outcome: Ongoing (interventions implemented as appropriate)      Problem: Skin Injury Risk (Adult)  Goal: Skin Health and Integrity  Outcome: Ongoing (interventions implemented as appropriate)      Problem: Infection, Risk/Actual (Adult)  Goal: Infection Prevention/Resolution  Outcome: Ongoing (interventions implemented as appropriate)      Problem: Airway, Artificial (Adult)  Goal: Signs and Symptoms of Listed Potential Problems Will be Absent, Minimized or Managed (Airway, Artificial)  Outcome: Outcome(s) achieved Date Met: 09/22/18      Problem: Nutrition, Enteral (Adult)  Goal: Signs and Symptoms of Listed Potential Problems Will be Absent, Minimized or Managed (Nutrition, Enteral)  Outcome: Outcome(s) achieved Date Met: 09/22/18      Problem: Pain, Acute (Adult)  Goal: Acceptable Pain Control/Comfort Level  Outcome: Ongoing (interventions implemented as appropriate)      Problem: Seizure Disorder/Epilepsy (Adult)  Goal: Signs and Symptoms of Listed Potential Problems Will be Absent, Minimized or Managed (Seizure Disorder/Epilepsy)  Outcome: Outcome(s) achieved Date Met: 09/22/18      Problem: Patient Care Overview  Goal: Plan of Care Review  Outcome: Ongoing (interventions implemented as appropriate)   09/22/18 1721   Coping/Psychosocial   Plan of Care Reviewed With patient   Plan of Care Review   Progress no change   OTHER   Outcome Summary Pt A&Ox3. VSS. Frequent req for trach suctioning. Worked with PT today. Bolus tube feeds continued. Will monitor. Possible DC tomorrow.

## 2018-09-22 NOTE — PROGRESS NOTES
"DAILY PROGRESS NOTE  [facility]    Patient Identification:  Name: Lam Viramontes  Age: 88 y.o.  Sex: male  :  1930  MRN: [id number]         Primary Care Physician: Suman Jaimes III, MD    Subjective:  Interval History:He complains of constipation.  Coughing and very weak.    Objective:    Scheduled Meds:  albuterol 2.5 mg Nebulization BID   apixaban 5 mg Oral Q12H   atorvastatin 40 mg Per G Tube Daily   budesonide 0.5 mg Nebulization BID - RT   docusate sodium 100 mg Oral Daily   finasteride 5 mg Oral Daily   insulin aspart 0-9 Units Subcutaneous 4x Daily With Meals & Nightly   ipratropium-albuterol 3 mL Nebulization 4x Daily - RT   lacosamide 100 mg Per G Tube Q12H   levETIRAcetam 1,500 mg Per G Tube Q12H   levothyroxine 75 mcg Per G Tube Q AM   magnesium citrate 296 mL Oral Once   midodrine 10 mg Per G Tube TID   mupirocin  Each Nare BID   polyethylene glycol 17 g Oral BID     Continuous Infusions:  lactated ringers 9 mL/hr Last Rate: 9 mL/hr (18 1540)       Vital signs in last 24 hours:  Temp:  [97.6 °F (36.4 °C)-98.2 °F (36.8 °C)] 98.2 °F (36.8 °C)  Heart Rate:  [] 91  Resp:  [18-22] 18  BP: (119-145)/(68-81) 145/75    Intake/Output:    Intake/Output Summary (Last 24 hours) at 18 1258  Last data filed at 18 0900   Gross per 24 hour   Intake              994 ml   Output             2350 ml   Net            -1356 ml       Exam:  /75 (BP Location: Right arm, Patient Position: Lying)   Pulse 91   Temp 98.2 °F (36.8 °C) (Oral)   Resp 18   Ht 175.3 cm (69.02\")   Wt 81.5 kg (179 lb 10.8 oz)   SpO2 95%   BMI 26.52 kg/m²     General Appearance:    Alert, cooperative, no distress   Head:    Normocephalic, without obvious abnormality, atraumatic   Eyes:       Throat:   Lips, tongue, gums normal   Neck:   trach   Lungs:     Rhonchi bilaterally, respirations unlabored   Chest Wall:    No tenderness or deformity    Heart:    Regular rate and rhythm, S1 and S2 " normal, no murmur,no  Rub or gallop   Abdomen:     Soft, non-tender, bowel sounds active, no masses, no organomegaly    Extremities:   Extremities normal, atraumatic, no cyanosis or edema   Pulses:      Skin:   Skin is warm and dry,  no rashes or palpable lesions   Neurologic:   He is weak      Lab Results (last 72 hours)     Procedure Component Value Units Date/Time    POC Glucose Once [[id number]]  (Abnormal) Collected:  09/22/18 1021    Specimen:  Blood Updated:  09/22/18 1022     Glucose 251 (H) mg/dL     Narrative:       Meter: [id number] : [id number] Luiszachary Chamberlain SHINE    Basic Metabolic Panel [[id number]]  (Abnormal) Collected:  09/22/18 0708    Specimen:  Blood Updated:  09/22/18 0802     Glucose 120 (H) mg/dL      BUN 16 mg/dL      Creatinine 0.72 (L) mg/dL      Sodium 135 (L) mmol/L      Potassium 4.6 mmol/L      Chloride 97 (L) mmol/L      CO2 30.7 (H) mmol/L      Calcium 9.2 mg/dL      eGFR Non African Amer 103 mL/min/1.73      BUN/Creatinine Ratio 22.2     Anion Gap 7.3 mmol/L     Narrative:       The MDRD GFR formula is only valid for adults with stable renal function between ages 18 and 70.    CBC (No Diff) [[id number]]  (Abnormal) Collected:  09/22/18 0708    Specimen:  Blood Updated:  09/22/18 0746     WBC 5.92 10*3/mm3      RBC 2.94 (L) 10*6/mm3      Hemoglobin 9.5 (L) g/dL      Hematocrit 30.7 (L) %      .4 (H) fL      MCH 32.3 pg      MCHC 30.9 (L) g/dL      RDW 12.9 %      RDW-SD 49.0 fl      MPV 10.4 fL      Platelets 211 10*3/mm3     POC Glucose Once [[id number]]  (Normal) Collected:  09/22/18 0614    Specimen:  Blood Updated:  09/22/18 0616     Glucose 127 mg/dL     Narrative:       Meter: [id number] : [id number] Sandra Katharine SHINE    POC Glucose Once [[id number]]  (Abnormal) Collected:  09/21/18 2152    Specimen:  Blood Updated:  09/21/18 2153     Glucose 211 (H) mg/dL     Narrative:       Meter: [id number] : [id number] Jean Carlos MULLIGAN    POC Glucose Once [[id number]]   (Normal) Collected:  09/21/18 1611    Specimen:  Blood Updated:  09/21/18 1612     Glucose 119 mg/dL     Narrative:       Meter: [id number] : [id number] Harry RIOJAS    Hepatic Function Panel [[id number]]  (Abnormal) Collected:  09/21/18 0453    Specimen:  Blood Updated:  09/21/18 1604     Total Protein 6.8 g/dL      Albumin 2.80 (L) g/dL      ALT (SGPT) 35 U/L      AST (SGOT) 31 U/L      Alkaline Phosphatase 123 (H) U/L      Total Bilirubin 0.4 mg/dL      Bilirubin, Direct <0.2 mg/dL      Bilirubin, Indirect -- mg/dL      Comment: Unable to calculate       POC Glucose Once [[id number]]  (Abnormal) Collected:  09/21/18 1127    Specimen:  Blood Updated:  09/21/18 1132     Glucose 245 (H) mg/dL     Narrative:       Meter: [id number] : [id number] Harry Huddlestonah SHINE    Basic Metabolic Panel [[id number]]  (Abnormal) Collected:  09/21/18 0453    Specimen:  Blood Updated:  09/21/18 0551     Glucose 115 (H) mg/dL      BUN 19 mg/dL      Creatinine 0.76 mg/dL      Sodium 136 mmol/L      Potassium 5.2 mmol/L      Chloride 98 mmol/L      CO2 30.8 (H) mmol/L      Calcium 9.1 mg/dL      eGFR Non African Amer 97 mL/min/1.73      BUN/Creatinine Ratio 25.0     Anion Gap 7.2 mmol/L     Narrative:       The MDRD GFR formula is only valid for adults with stable renal function between ages 18 and 70.    POC Glucose Once [[id number]]  (Abnormal) Collected:  09/21/18 0525    Specimen:  Blood Updated:  09/21/18 0526     Glucose 141 (H) mg/dL     Narrative:       Meter: [id number] : [id number] Raman Dhillononya JENELLE    Hemoglobin & Hematocrit, Blood [[id number]]  (Abnormal) Collected:  09/20/18 2353    Specimen:  Blood Updated:  09/21/18 0012     Hemoglobin 9.6 (L) g/dL      Hematocrit 30.7 (L) %     POC Glucose Once [[id number]]  (Abnormal) Collected:  09/20/18 2043    Specimen:  Blood Updated:  09/20/18 2046     Glucose 168 (H) mg/dL     Narrative:       Meter: [id number] : [id number] Raman Curry CNA    Hemoglobin & Hematocrit,  Blood [[id number]]  (Abnormal) Collected:  09/20/18 1603    Specimen:  Blood Updated:  09/20/18 1653     Hemoglobin 10.7 (L) g/dL      Hematocrit 34.2 (L) %     POC Glucose Once [[id number]]  (Abnormal) Collected:  09/20/18 1621    Specimen:  Blood Updated:  09/20/18 1624     Glucose 223 (H) mg/dL     Narrative:       Meter: [id number] : [id number] Luca RIOJAS    POC Glucose Once [[id number]]  (Abnormal) Collected:  09/20/18 1137    Specimen:  Blood Updated:  09/20/18 1141     Glucose 257 (H) mg/dL     Narrative:       Meter: [id number] : [id number] Luca RIOJAS    Respiratory Culture - Aspirate, ET Suction [[id number]]  (Abnormal)  (Susceptibility) Collected:  09/16/18 1928    Specimen:  Aspirate from ET Suction Updated:  09/20/18 0722     Respiratory Culture Heavy growth (4+) Pseudomonas aeruginosa (A)      Heavy growth (4+) Providencia rettgeri (A)      No Normal Respiratory Yanira (A)     Gram Stain Result Rare (1+) WBCs seen      Rare (1+) Epithelial cells per low power field      Few (2+) Gram negative bacilli    Susceptibility      Pseudomonas aeruginosa    Providencia rettgeri       MERI    MERI       Amikacin 4 ug/ml Susceptible             Ampicillin       <=2 ug/ml Resistant       Ampicillin + Sulbactam       <=2 ug/ml Susceptible       Cefazolin       >=64 ug/ml Resistant       Cefepime 2 ug/ml Susceptible    <=1 ug/ml Susceptible       Cefoxitin       <=4 ug/ml Susceptible       Ceftazidime 4 ug/ml Susceptible             Ceftriaxone       <=1 ug/ml Susceptible       Ciprofloxacin <=0.25 ug/ml Susceptible             Gentamicin 2 ug/ml Susceptible    <=1 ug/ml Susceptible       Levofloxacin 1 ug/ml Susceptible    <=0.12 ug/ml Susceptible       Meropenem 1 ug/ml Susceptible             Piperacillin + Tazobactam 8 ug/ml Susceptible    <=4 ug/ml Susceptible       Tobramycin <=1 ug/ml Susceptible             Trimethoprim + Sulfamethoxazole       >=320 ug/ml Resistant                       POC Glucose Once [[id number]]  (Normal) Collected:  09/20/18 0632    Specimen:  Blood Updated:  09/20/18 0633     Glucose 96 mg/dL     Narrative:       Meter: [id number] : [id number] [place] ISAAK    CBC (No Diff) [[id number]]  (Abnormal) Collected:  09/20/18 0506    Specimen:  Blood Updated:  09/20/18 0617     WBC 7.34 10*3/mm3      RBC 2.68 (L) 10*6/mm3      Hemoglobin 8.6 (L) g/dL      Hematocrit 28.3 (L) %      .6 (H) fL      MCH 32.1 pg      MCHC 30.4 (L) g/dL      RDW 13.3 %      RDW-SD 50.5 fl      MPV 10.4 fL      Platelets 189 10*3/mm3     Hemoglobin & Hematocrit, Blood [[id number]]  (Abnormal) Collected:  09/20/18 0009    Specimen:  Blood Updated:  09/20/18 0036     Hemoglobin 9.6 (L) g/dL      Hematocrit 30.4 (L) %     POC Glucose Once [[id number]]  (Abnormal) Collected:  09/19/18 2037    Specimen:  Blood Updated:  09/19/18 2040     Glucose 191 (H) mg/dL     Narrative:       Meter: [id number] : [id number] [place] ISAAK    Blood Culture - Blood, [[id number]]  (Normal) Collected:  09/14/18 2002    Specimen:  Blood from Arm, Right Updated:  09/19/18 2030     Blood Culture No growth at 5 days    Blood Culture - Blood, [[id number]]  (Normal) Collected:  09/14/18 2009    Specimen:  Blood from Arm, Left Updated:  09/19/18 2030     Blood Culture No growth at 5 days    Hemoglobin & Hematocrit, Blood [[id number]]  (Abnormal) Collected:  09/19/18 1634    Specimen:  Blood Updated:  09/19/18 1657     Hemoglobin 10.2 (L) g/dL      Hematocrit 32.6 (L) %     POC Glucose Once [[id number]]  (Abnormal) Collected:  09/19/18 1634    Specimen:  Blood Updated:  09/19/18 1637     Glucose 209 (H) mg/dL     Narrative:       Meter: [id number] : [id number] Alicia Turner CNA        Data Review:    Results from last 7 days  Lab Units 09/22/18  0708 09/21/18  0453 09/18/18  0523   SODIUM mmol/L 135* 136 134*   POTASSIUM mmol/L 4.6 5.2 4.7   CHLORIDE mmol/L 97* 98 98   CO2 mmol/L 30.7* 30.8* 26.9   BUN  mg/dL 16 19 23   CREATININE mg/dL 0.72* 0.76 0.70*   GLUCOSE mg/dL 120* 115* 152*   CALCIUM mg/dL 9.2 9.1 9.0       Results from last 7 days  Lab Units 09/22/18  0708 09/20/18  2353 09/20/18  1603 09/20/18  0506  09/19/18  0500   WBC 10*3/mm3 5.92  --   --  7.34  --  10.09   HEMOGLOBIN g/dL 9.5* 9.6* 10.7* 8.6*  < > 9.9*   HEMATOCRIT % 30.7* 30.7* 34.2* 28.3*  < > 31.8*   PLATELETS 10*3/mm3 211  --   --  189  --  218   < > = values in this interval not displayed.    Results from last 7 days  Lab Units 09/16/18  0542   TSH mIU/mL 2.430           Lab Results  Lab Value Date/Time   TROPONINT <0.010 09/14/2018 2002   TROPONINT 0.024 01/20/2018 0520   TROPONINT <0.010 01/19/2018 1013   TROPONINT <0.010 01/17/2018 0839   TROPONINT <0.010 12/29/2017 1320   TROPONINT <0.010 12/29/2017 0552   TROPONINT <0.010 12/28/2017 2325   TROPONINT <0.010 12/28/2017 2040           Results from last 7 days  Lab Units 09/21/18  0453   ALK PHOS U/L 123*   BILIRUBIN mg/dL 0.4   BILIRUBIN DIRECT mg/dL <0.2   ALT (SGPT) U/L 35   AST (SGOT) U/L 31       Results from last 7 days  Lab Units 09/16/18  0542   TSH mIU/mL 2.430         Glucose   Date/Time Value Ref Range Status   09/22/2018 1021 251 (H) 70 - 130 mg/dL Final   09/22/2018 0614 127 70 - 130 mg/dL Final   09/21/2018 2152 211 (H) 70 - 130 mg/dL Final   09/21/2018 1611 119 70 - 130 mg/dL Final   09/21/2018 1127 245 (H) 70 - 130 mg/dL Final   09/21/2018 0525 141 (H) 70 - 130 mg/dL Final   09/20/2018 2043 168 (H) 70 - 130 mg/dL Final   09/20/2018 1621 223 (H) 70 - 130 mg/dL Final           Patient Active Problem List   Diagnosis Code   • Chest pain (non-cardiac) R07.9   • Atrial fibrillation (CMS/formerly [facility]) I48.91   • C. difficile diarrhea A04.72   • Coronary artery disease I25.10   • Diabetes mellitus (CMS/formerly [facility]) E11.9   • Disease of thyroid gland E07.9   • Hypertension I10   • Pressure ulcer of sacral region L89.159   • Ankylosing spondylitis lumbar region (CMS/formerly [facility]) M45.6   • Neurogenic bladder  due to ankylosing spondylitis N31.9   • Tracheostomy in place (CMS/[facility]) Z93.0   • PEG (percutaneous endoscopic gastrostomy) status (CMS/[facility]) Z93.1   • Acute UTI N39.0   • Sepsis (CMS/[facility]) A41.9   • Clostridium difficile colitis A04.72   • Anticoagulated Z79.01   • Closed compression fracture of L1 lumbar vertebra (CMS/[facility]) S32.010A   • Tracheostomy status (CMS/[facility]) Z93.0   • Seizures (CMS/[facility]) R56.9   • PAF (paroxysmal atrial fibrillation) (CMS/[facility]) I48.0   • Neuromuscular dysfunction of bladder N31.9   • Hypothyroidism E03.9   • 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   • COPD (chronic obstructive pulmonary disease) (CMS/[facility]) J44.9   • CAD (coronary artery disease) I25.10   • AV block, 1st degree I44.0   • Bifascicular block I45.2   • Macrocytic anemia D53.9   • Diabetes type 2, controlled (CMS/[facility]) E11.9   • History of Clostridium difficile colitis Z86.19   • Anticoagulated Z79.01       Assessment:  Active Hospital Problems    Diagnosis Date Noted   • **Closed compression fracture of L1 lumbar vertebra (CMS/[facility]) [S32.010A] 09/14/2018   • Tracheostomy status (CMS/[facility]) [Z93.0] 09/15/2018   • Seizures (CMS/[facility]) [R56.9] 09/15/2018   • PAF (paroxysmal atrial fibrillation) (CMS/[facility]) [I48.0] 09/15/2018   • Neuromuscular dysfunction of bladder [N31.9] 09/15/2018   • Hypothyroidism [E03.9] 09/15/2018   • Gastrostomy tube dependent (CMS/[facility]) [Z93.1] 09/15/2018   • COPD (chronic obstructive pulmonary disease) (CMS/[facility]) [J44.9] 09/15/2018   • CAD (coronary artery disease) [I25.10] 09/15/2018   • AV block, 1st degree [I44.0] 09/15/2018   • Bifascicular block [I45.2] 09/15/2018   • Macrocytic anemia [D53.9] 09/15/2018   • Diabetes type 2, controlled (CMS/[facility]) [E11.9] 09/15/2018   • History of Clostridium difficile colitis [Z86.19] 09/15/2018   • Anticoagulated [Z79.01] 09/15/2018      Resolved Hospital Problems    Diagnosis Date Noted Date Resolved   • Hyperkalemia [E87.5] 09/15/2018 09/18/2018       Plan:  Events noted. RX  for constipation and will need SNU for rehab? Maybe tomorrow.  Continue tube feeds and pulmonary toilet.    Adalid Roberts MD  9/22/2018  12:58 PM

## 2018-09-22 NOTE — PROGRESS NOTES
[facility]     Mar/chart reviewed  F/u respiratory failure, tracheostomy  +secretions requiring suctioning    Vital Sign Min/Max for last 24 hours  Temp  Min: 97.6 °F (36.4 °C)  Max: 98.2 °F (36.8 °C)   BP  Min: 119/68  Max: 145/75   Pulse  Min: 91  Max: 109   Resp  Min: 18  Max: 20   SpO2  Min: 94 %  Max: 97 %   Flow (L/min)  Min: 6  Max: 6   No Data Recorded     Appears ill , sleepy  perrl, eomi, no icterus,  mmm, no jvd, trachea midline, neck supple,  chest decrased, some coarse bilaterally, no crackles, no wheezes,   rrr,   soft, nt, some distended +bs,  no c/c/ 1+ edema    Labs:  Wbc 5.92  hgb 9.5  pls 211  Cr 0.72  Bicarb 30      PCCM Problems  Chronic Tracheostomy with trach collar  Ex-smoker with possible underlying COPD  Chronic dysphagia with PEG feeding tube  Abdominal distention  Swelling of the penis  Relevant Medical Diagnoses  Lumbar compression fracture pending intervention  PAF  Diabetes  Chronic hypotension on midodrine     Plan of Treatment  Keep with cuffed trach for now.  He plans to see ENT as an outpatient.  They will address appropriate trach. I am also happy to switch it back to a cuff-less #6 Shiley if so required.     Continue nebs.

## 2018-09-22 NOTE — THERAPY TREATMENT NOTE
Acute Care - Physical Therapy Treatment Note  [facility]     Patient Name: Lam Viramontes  : 1930  MRN: [id number]  Today's Date: 2018  Onset of Illness/Injury or Date of Surgery: 18  Date of Referral to PT: 18  Referring Physician: Nayeli    Admit Date: 2018    Visit Dx:    ICD-10-CM ICD-9-CM   1. Closed compression fracture of first lumbar vertebra, initial encounter (CMS/[facility]) S32.010A 805.4   2. UTI due to extended-spectrum beta lactamase (ESBL) producing Escherichia coli N39.0 599.0    A49.8 041.49    Z16.12 V09.1   3. Impaired functional mobility, balance, gait, and endurance Z74.09 V49.89     Patient Active Problem List   Diagnosis   • Chest pain (non-cardiac)   • Atrial fibrillation (CMS/[facility])   • C. difficile diarrhea   • Coronary artery disease   • Diabetes mellitus (CMS/[facility])   • Disease of thyroid gland   • Hypertension   • Pressure ulcer of sacral region   • Ankylosing spondylitis lumbar region (CMS/[facility])   • Neurogenic bladder due to ankylosing spondylitis   • Tracheostomy in place (CMS/[facility])   • PEG (percutaneous endoscopic gastrostomy) status (CMS/[facility])   • Acute UTI   • Sepsis (CMS/[facility])   • Clostridium difficile colitis   • Anticoagulated   • Closed compression fracture of L1 lumbar vertebra (CMS/[facility])   • Tracheostomy status (CMS/[facility])   • Seizures (CMS/[facility])   • PAF (paroxysmal atrial fibrillation) (CMS/[facility])   • Neuromuscular dysfunction of bladder   • Hypothyroidism   • Gastrostomy tube dependent (CMS/[facility])   • COPD (chronic obstructive pulmonary disease) (CMS/[facility])   • CAD (coronary artery disease)   • AV block, 1st degree   • Bifascicular block   • Macrocytic anemia   • Diabetes type 2, controlled (CMS/[facility])   • History of Clostridium difficile colitis   • Anticoagulated       Therapy Treatment          Rehabilitation Treatment Summary     Row Name 18 1500             Treatment Time/Intention    Discipline physical therapy assistant  -SI      Document Type therapy  note (daily note)  -SI      Subjective Information complains of;weakness;dyspnea  -SI      Therapy Frequency (PT Clinical Impression) daily  -SI      Patient Effort adequate  -SI      Treatment Considerations/Comments O2 tubing to trach repeatedly coming loose, nursing aware and to replace....Back brace pressing up on his trach with his poor sitting posture side of bed.  -SI      Recorded by [SI] Shannon Pollard, [facility] 09/22/18 1526      Row Name 09/22/18 1500             Vital Signs    Pre SpO2 (%) --      -SI      Post SpO2 (%) 99  -SI      Recorded by [SI] Shannon Pollard, [facility] 09/22/18 1530      Row Name 09/22/18 1500             Cognitive Assessment/Intervention- PT/OT    Follows Commands (Cognition) follows one step commands  -SI      Recorded by [SI] Shannon Pollard, [facility] 09/22/18 1526      Row Name 09/22/18 1500             Bed Mobility Assessment/Treatment    Supine-Sit Reagan (Bed Mobility) moderate assist (50% patient effort);2 person assist  -SI      Sit-Supine Reagan (Bed Mobility) moderate assist (50% patient effort);2 person assist  -SI      Recorded by [SI] Shannon Pollard, [facility] 09/22/18 1526      Row Name 09/22/18 1500             Transfer Assessment/Treatment    Transfer Assessment/Treatment sit-stand transfer   x 2 with good effort but weak and mod assist 2  -SI      Recorded by [SI] Shannon Pollard, [facility] 09/22/18 1526      Row Name 09/22/18 1500             Gait/Stairs Assessment/Training    Distance in Feet (Gait) 0  -SI      Recorded by [SI] Shannon Pollard, [facility] 09/22/18 1526      Row Name 09/22/18 1500             Lower Extremity Seated Therapeutic Exercise    Exercise Type, Seated Lower Extremity (Therapeutic Exercise) AAROM (active assistive range of motion)  -SI      Recorded by [SI] Shannon Pollard, [facility] 09/22/18 1526      Row Name 09/22/18 1500             Positioning and Restraints    Pre-Treatment Position in bed  -SI      Post Treatment Position bed  -SI      Recorded by [SI]  Shannon Pollard, PTA 09/22/18 1526      Row Name 09/22/18 1500             Pain Assessment    Additional Documentation Pain Scale 3: FACES Pre/Post-Treatment (Group)  -SI      Recorded by [SI] Shannon Pollard, PTA 09/22/18 1526      Row Name                Wound 09/15/18 0021 Bilateral coccyx pressure injury    Wound - Properties Group Date first assessed: 09/15/18 [PB] Time first assessed: 0021 [PB] Present On Admission : picture taken [PB] Side: Bilateral [PB] Location: coccyx [PB] Type: pressure injury [PB] Stage, Pressure Injury: Stage 1 [PB] Recorded by:  [PB] Emely Mcqueen, RN 09/15/18 0123    Row Name                Wound 09/17/18 1657 Other (See comments) back incision    Wound - Properties Group Date first assessed: 09/17/18 [JT] Time first assessed: 1657 [JT] Side: Other (See comments) [JT] Location: back [JT] Type: incision [JT] Recorded by:  [JT] Altagracia Bob RN 09/17/18 1657      User Key  (r) = Recorded By, (t) = Taken By, (c) = Cosigned By    Initials Name Effective Dates Discipline    SI Shannon Pollard, PTA 05/18/15 -  PT    PB Emely Mcqueen, RN 06/16/16 -  Nurse    JT Altagracia Bob RN 06/16/16 -  Nurse          Wound 09/15/18 0021 Bilateral coccyx pressure injury (Active)   Dressing Appearance open to air 9/22/2018  2:19 PM   Closure None;Open to air 9/22/2018  2:19 PM   Base pink;dry;clean 9/22/2018  2:19 PM   Periwound blanchable 9/22/2018  2:19 PM   Drainage Amount none 9/22/2018  2:19 PM   Care, Wound cleansed with;sterile water 9/22/2018  8:52 AM   Dressing Care, Wound open to air 9/22/2018  8:52 AM   Periwound Care, Wound barrier ointment applied 9/22/2018  8:52 AM       Wound 09/17/18 1657 Other (See comments) back incision (Active)   Dressing Appearance intact;dry 9/22/2018  2:19 PM   Closure Adhesive bandage;Staples 9/22/2018  2:19 PM   Drainage Amount scant 9/22/2018  2:19 PM   Dressing Care, Wound dressing changed 9/21/2018  8:30 PM             Physical Therapy Education      Title: PT OT SLP Therapies (Active)     Topic: Physical Therapy (Active)     Point: Mobility training (Active)    Learning Progress Summary     Learner Status Readiness Method Response Comment Documented by    Patient Active Acceptance E,D NR upright posture to avoid pressure from brace pressing up to trach SI 09/22/18 1526     Done Acceptance E,D VU,NR   09/21/18 1556     Done Acceptance E VU,NR   09/19/18 0942     Done Acceptance E VU,NR   09/18/18 0952          Point: Home exercise program (Active)    Learning Progress Summary     Learner Status Readiness Method Response Comment Documented by    Patient Active Acceptance E,D NR upright posture to avoid pressure from brace pressing up to trach SI 09/22/18 1526     Done Acceptance E,D VU,NR   09/21/18 1556     Done Acceptance E VU,NR   09/19/18 0942     Done Acceptance E VU,NR   09/18/18 0952          Point: Body mechanics (Active)    Learning Progress Summary     Learner Status Readiness Method Response Comment Documented by    Patient Active Acceptance E,D NR upright posture to avoid pressure from brace pressing up to trach SI 09/22/18 1526     Done Acceptance E,D VU,NR   09/21/18 1556     Done Acceptance E VU,NR   09/19/18 0942     Done Acceptance E VU,NR   09/18/18 0952          Point: Precautions (Active)    Learning Progress Summary     Learner Status Readiness Method Response Comment Documented by    Patient Active Acceptance E,D NR upright posture to avoid pressure from brace pressing up to trach SI 09/22/18 1526     Done Acceptance E,D VU,NR   09/21/18 1556     Done Acceptance E VU,NR   09/19/18 0942     Done Acceptance E VU,NR   09/18/18 0952                      User Key     Initials Effective Dates Name Provider Type Discipline     05/18/15 -  Shannon Pollard, PTA Physical Therapy Assistant PT     04/03/18 -  Elsy Wilson, PT Physical Therapist PT     03/07/18 -  Brittnee Griffiths, [place] Physical Therapy Assistant PT                     PT Recommendation and Plan  Therapy Frequency (PT Clinical Impression): daily             Outcome Measures     Row Name 09/22/18 1500 09/21/18 1500 09/20/18 1100       How much help from another person do you currently need...    Turning from your back to your side while in flat bed without using bedrails? 2  -SI 3  -SM 3  -LH    Moving from lying on back to sitting on the side of a flat bed without bedrails? 2  -SI 3  -SM 3  -LH    Moving to and from a bed to a chair (including a wheelchair)? 2  -SI 2  -SM 3  -LH    Standing up from a chair using your arms (e.g., wheelchair, bedside chair)? 2  -SI 3  -SM 3  -LH    Climbing 3-5 steps with a railing? 2  -SI 1  -SM 2  -LH    To walk in hospital room? 2  -SI 2  -SM 2  -LH    AM-PAC 6 Clicks Score 12  -SI 14  -SM 16  -LH       Functional Assessment    Outcome Measure Options AM-PAC 6 Clicks Basic Mobility (PT)  -SI AM-PAC 6 Clicks Basic Mobility (PT)  -SM AM-PAC 6 Clicks Basic Mobility (PT)  -LH      User Key  (r) = Recorded By, (t) = Taken By, (c) = Cosigned By    Initials Name Provider Type    SI Shannon Pollard, [place] Physical Therapy Assistant     Elsy Wilson, PT Physical Therapist    Brittnee Doty, [place] Physical Therapy Assistant           Time Calculation:         PT Charges     Row Name 09/22/18 1528             Time Calculation    Start Time 1500  -SI      Stop Time 1520  -SI      Time Calculation (min) 20 min  -SI         Time Calculation- PT    Total Timed Code Minutes- PT 20 minute(s)  -SI        User Key  (r) = Recorded By, (t) = Taken By, (c) = Cosigned By    Initials Name Provider Type    Shannon Flores, [place] Physical Therapy Assistant        Therapy Suggested Charges     Code   Minutes Charges    None           Therapy Charges for Today     Code Description Service Date Service Provider Modifiers Qty    [id number] HC PT THER PROC EA 15 MIN 9/22/2018 Shannon Pollard, [place] GP 1          PT G-Codes  Outcome Measure Options:  AM-PAC 6 Clicks Basic Mobility (PT)  -PAC 6 Clicks Score: 12    Shannon Pollard, PTA  9/22/2018

## 2018-09-23 LAB
ANION GAP SERPL CALCULATED.3IONS-SCNC: 8.9 MMOL/L
BASOPHILS # BLD AUTO: 0.01 10*3/MM3 (ref 0–0.2)
BASOPHILS NFR BLD AUTO: 0.2 % (ref 0–1.5)
BUN BLD-MCNC: 17 MG/DL (ref 8–23)
BUN/CREAT SERPL: 21.3 (ref 7–25)
CALCIUM SPEC-SCNC: 9.3 MG/DL (ref 8.6–10.5)
CHLORIDE SERPL-SCNC: 97 MMOL/L (ref 98–107)
CO2 SERPL-SCNC: 31.1 MMOL/L (ref 22–29)
CREAT BLD-MCNC: 0.8 MG/DL (ref 0.76–1.27)
DEPRECATED RDW RBC AUTO: 48.7 FL (ref 37–54)
EOSINOPHIL # BLD AUTO: 0.09 10*3/MM3 (ref 0–0.7)
EOSINOPHIL NFR BLD AUTO: 1.5 % (ref 0.3–6.2)
ERYTHROCYTE [DISTWIDTH] IN BLOOD BY AUTOMATED COUNT: 12.8 % (ref 11.5–14.5)
GFR SERPL CREATININE-BSD FRML MDRD: 91 ML/MIN/1.73
GLUCOSE BLD-MCNC: 121 MG/DL (ref 65–99)
GLUCOSE BLDC GLUCOMTR-MCNC: 139 MG/DL (ref 70–130)
GLUCOSE BLDC GLUCOMTR-MCNC: 149 MG/DL (ref 70–130)
GLUCOSE BLDC GLUCOMTR-MCNC: 203 MG/DL (ref 70–130)
HCT VFR BLD AUTO: 33.7 % (ref 40.4–52.2)
HGB BLD-MCNC: 10.6 G/DL (ref 13.7–17.6)
IMM GRANULOCYTES # BLD: 0 10*3/MM3 (ref 0–0.03)
IMM GRANULOCYTES NFR BLD: 0 % (ref 0–0.5)
LYMPHOCYTES # BLD AUTO: 1.26 10*3/MM3 (ref 0.9–4.8)
LYMPHOCYTES NFR BLD AUTO: 21.1 % (ref 19.6–45.3)
MCH RBC QN AUTO: 32.6 PG (ref 27–32.7)
MCHC RBC AUTO-ENTMCNC: 31.5 G/DL (ref 32.6–36.4)
MCV RBC AUTO: 103.7 FL (ref 79.8–96.2)
MONOCYTES # BLD AUTO: 0.61 10*3/MM3 (ref 0.2–1.2)
MONOCYTES NFR BLD AUTO: 10.2 % (ref 5–12)
NEUTROPHILS # BLD AUTO: 3.99 10*3/MM3 (ref 1.9–8.1)
NEUTROPHILS NFR BLD AUTO: 67 % (ref 42.7–76)
PLATELET # BLD AUTO: 218 10*3/MM3 (ref 140–500)
PMV BLD AUTO: 10.2 FL (ref 6–12)
POTASSIUM BLD-SCNC: 4.6 MMOL/L (ref 3.5–5.2)
RBC # BLD AUTO: 3.25 10*6/MM3 (ref 4.6–6)
SODIUM BLD-SCNC: 137 MMOL/L (ref 136–145)
WBC NRBC COR # BLD: 5.96 10*3/MM3 (ref 4.5–10.7)

## 2018-09-23 PROCEDURE — 93005 ELECTROCARDIOGRAM TRACING: CPT | Performed by: NURSE PRACTITIONER

## 2018-09-23 PROCEDURE — 94799 UNLISTED PULMONARY SVC/PX: CPT

## 2018-09-23 PROCEDURE — 63710000001 INSULIN ASPART PER 5 UNITS: Performed by: HOSPITALIST

## 2018-09-23 PROCEDURE — 82962 GLUCOSE BLOOD TEST: CPT

## 2018-09-23 PROCEDURE — 85025 COMPLETE CBC W/AUTO DIFF WBC: CPT | Performed by: HOSPITALIST

## 2018-09-23 PROCEDURE — 93010 ELECTROCARDIOGRAM REPORT: CPT | Performed by: INTERNAL MEDICINE

## 2018-09-23 PROCEDURE — 80048 BASIC METABOLIC PNL TOTAL CA: CPT | Performed by: HOSPITALIST

## 2018-09-23 RX ORDER — HYDROCODONE BITARTRATE AND ACETAMINOPHEN 5; 325 MG/1; MG/1
1 TABLET ORAL EVERY 6 HOURS PRN
Qty: 20 TABLET | Refills: 0 | Status: SHIPPED | OUTPATIENT
Start: 2018-09-23 | End: 2018-09-25

## 2018-09-23 RX ORDER — PSEUDOEPHEDRINE HCL 30 MG
100 TABLET ORAL 2 TIMES DAILY PRN
Start: 2018-09-23

## 2018-09-23 RX ORDER — ACETAMINOPHEN 160 MG/5ML
650 SOLUTION ORAL EVERY 4 HOURS PRN
Start: 2018-09-23

## 2018-09-23 RX ADMIN — POLYETHYLENE GLYCOL 3350 17 G: 17 POWDER, FOR SOLUTION ORAL at 08:14

## 2018-09-23 RX ADMIN — MIDODRINE HYDROCHLORIDE 10 MG: 5 TABLET ORAL at 08:14

## 2018-09-23 RX ADMIN — INSULIN ASPART 4 UNITS: 100 INJECTION, SOLUTION INTRAVENOUS; SUBCUTANEOUS at 12:48

## 2018-09-23 RX ADMIN — MUPIROCIN 10 APPLICATION: 20 OINTMENT TOPICAL at 08:14

## 2018-09-23 RX ADMIN — LACOSAMIDE 100 MG: 100 TABLET, FILM COATED ORAL at 20:52

## 2018-09-23 RX ADMIN — IPRATROPIUM BROMIDE AND ALBUTEROL SULFATE 3 ML: .5; 3 SOLUTION RESPIRATORY (INHALATION) at 14:37

## 2018-09-23 RX ADMIN — MIDODRINE HYDROCHLORIDE 10 MG: 5 TABLET ORAL at 17:23

## 2018-09-23 RX ADMIN — POLYETHYLENE GLYCOL 3350 17 G: 17 POWDER, FOR SOLUTION ORAL at 20:53

## 2018-09-23 RX ADMIN — IPRATROPIUM BROMIDE AND ALBUTEROL SULFATE 3 ML: .5; 3 SOLUTION RESPIRATORY (INHALATION) at 06:55

## 2018-09-23 RX ADMIN — ATORVASTATIN CALCIUM 40 MG: 20 TABLET, FILM COATED ORAL at 08:14

## 2018-09-23 RX ADMIN — ALBUTEROL SULFATE 2.5 MG: 2.5 SOLUTION RESPIRATORY (INHALATION) at 20:12

## 2018-09-23 RX ADMIN — APIXABAN 5 MG: 5 TABLET, FILM COATED ORAL at 20:52

## 2018-09-23 RX ADMIN — LEVOTHYROXINE SODIUM 75 MCG: 75 TABLET ORAL at 06:47

## 2018-09-23 RX ADMIN — DOCUSATE SODIUM 100 MG: 50 LIQUID ORAL at 08:14

## 2018-09-23 RX ADMIN — IPRATROPIUM BROMIDE AND ALBUTEROL SULFATE 3 ML: .5; 3 SOLUTION RESPIRATORY (INHALATION) at 10:49

## 2018-09-23 RX ADMIN — MIDODRINE HYDROCHLORIDE 10 MG: 5 TABLET ORAL at 23:32

## 2018-09-23 RX ADMIN — BUDESONIDE 0.5 MG: 0.5 INHALANT RESPIRATORY (INHALATION) at 20:12

## 2018-09-23 RX ADMIN — APIXABAN 5 MG: 5 TABLET, FILM COATED ORAL at 08:14

## 2018-09-23 RX ADMIN — LEVETIRACETAM 1500 MG: 100 SOLUTION ORAL at 08:13

## 2018-09-23 RX ADMIN — BUDESONIDE 0.5 MG: 0.5 INHALANT RESPIRATORY (INHALATION) at 06:55

## 2018-09-23 RX ADMIN — LEVETIRACETAM 1500 MG: 100 SOLUTION ORAL at 20:53

## 2018-09-23 RX ADMIN — LACOSAMIDE 100 MG: 100 TABLET, FILM COATED ORAL at 08:14

## 2018-09-23 RX ADMIN — MAGNESIUM HYDROXIDE 10 ML: 2400 SUSPENSION ORAL at 00:32

## 2018-09-23 RX ADMIN — FINASTERIDE 5 MG: 5 TABLET, FILM COATED ORAL at 08:14

## 2018-09-23 RX ADMIN — BISACODYL 10 MG: 10 SUPPOSITORY RECTAL at 00:33

## 2018-09-23 RX ADMIN — HYDROCODONE BITARTRATE AND ACETAMINOPHEN 1 TABLET: 5; 325 TABLET ORAL at 23:54

## 2018-09-23 NOTE — DISCHARGE SUMMARY
PHYSICIAN DISCHARGE SUMMARY                                                                        [facility]    Patient Identification:  Name: Lam Viramontes  Age: 88 y.o.  Sex: male  :  1930  MRN: [id number]  Primary Care Physician: Suman Jaimes III, MD    Admit date: 2018  Discharge date and time:2018  Discharged Condition: fair    Discharge Diagnoses:  Active Hospital Problems    Diagnosis Date Noted   • **Closed compression fracture of L1 lumbar vertebra (CMS/[facility]) [S32.010A] 2018   • Tracheostomy status (CMS/[facility]) [Z93.0] 09/15/2018   • Seizures (CMS/[facility]) [R56.9] 09/15/2018   • PAF (paroxysmal atrial fibrillation) (CMS/[facility]) [I48.0] 09/15/2018   • Neuromuscular dysfunction of bladder [N31.9] 09/15/2018   • Hypothyroidism [E03.9] 09/15/2018   • Gastrostomy tube dependent (CMS/[facility]) [Z93.1] 09/15/2018   • COPD (chronic obstructive pulmonary disease) (CMS/[facility]) [J44.9] 09/15/2018   • CAD (coronary artery disease) [I25.10] 09/15/2018   • AV block, 1st degree [I44.0] 09/15/2018   • Bifascicular block [I45.2] 09/15/2018   • Macrocytic anemia [D53.9] 09/15/2018   • Diabetes type 2, controlled (CMS/[facility]) [E11.9] 09/15/2018   • History of Clostridium difficile colitis [Z86.19] 09/15/2018   • Anticoagulated [Z79.01] 09/15/2018      Resolved Hospital Problems    Diagnosis Date Noted Date Resolved   • Hyperkalemia [E87.5] 09/15/2018 2018      Patient Active Problem List   Diagnosis Code   • Chest pain (non-cardiac) R07.9   • Atrial fibrillation (CMS/[facility]) I48.91   • C. difficile diarrhea A04.72   • Coronary artery disease I25.10   • Diabetes mellitus (CMS/[facility]) E11.9   • Disease of thyroid gland E07.9   • Hypertension I10   • Pressure ulcer of sacral region L89.159   • Ankylosing spondylitis lumbar region (CMS/[facility]) M45.6   • Neurogenic bladder due to ankylosing spondylitis N31.9   • Tracheostomy in place (CMS/[facility])  Z93.0   • PEG (percutaneous endoscopic gastrostomy) status (CMS/[facility]) Z93.1   • Acute UTI N39.0   • Sepsis (CMS/[facility]) A41.9   • Clostridium difficile colitis A04.72   • Anticoagulated Z79.01   • Closed compression fracture of L1 lumbar vertebra (CMS/[facility]) S32.010A   • Tracheostomy status (CMS/[facility]) Z93.0   • Seizures (CMS/[facility]) R56.9   • PAF (paroxysmal atrial fibrillation) (CMS/[facility]) I48.0   • Neuromuscular dysfunction of bladder N31.9   • Hypothyroidism E03.9   • Gastrostomy tube dependent (CMS/[facility]) Z93.1   • COPD (chronic obstructive pulmonary disease) (CMS/[facility]) J44.9   • CAD (coronary artery disease) I25.10   • AV block, 1st degree I44.0   • Bifascicular block I45.2   • Macrocytic anemia D53.9   • Diabetes type 2, controlled (CMS/HCC) E11.9   • History of Clostridium difficile colitis Z86.19   • Anticoagulated Z79.01       PMHX:   Past Medical History:   Diagnosis Date   • Ankylosing spondylitis lumbar region (CMS/[facility])    • BPH (benign prostatic hyperplasia)    • C. difficile diarrhea    • Constipation    • Coronary artery disease    • Diabetes mellitus (CMS/[facility])    • Dysphagia    • Hyperlipidemia    • Hypertension    • Hypothyroidism    • MRSA infection    • Neuromuscular dysfunction of bladder    • PAF (paroxysmal atrial fibrillation) (CMS/[facility])    • Pain    • Pressure ulcer of sacral region    • Seizures (CMS/[facility])    • Tracheostomy status (CMS/HCC)      PSHX:   Past Surgical History:   Procedure Laterality Date   • CARDIAC SURGERY     • CORONARY ARTERY BYPASS GRAFT      x 5   • GTUBE INSERTION     • LUMBAR DISCECTOMY FUSION INSTRUMENTATION N/A 9/17/2018    Procedure: T12-L2 POSTERIOR ARTHODESIS FUSION WITH MEDTR[employer] NAVIGATION;  Surgeon: Jose Daniel Tyler MD;  Location: [facility];  Service: Neurosurgery   • TRACHEOSTOMY         Hospital Course: Lam Viramontes  Is an 88-year-old gentleman with an extensive and very complicated medical history.  Unfortunately this point he is exceedingly vague as to what is  "acute issues are.  His chronic medical issues include severe dysphagia and he is G-tube dependent.  He also has a neurogenic bladder and is Chandler catheter dependent.  He has a history of seizure disorder.  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  There is a given history of previous MRSA infections as well as recurrent C. difficile colitis and of course recurrent UTIs with a chronic Chandler catheter.  His medication list includes Zyvox which reportedly was being given for ESBL UTI.  The last urinalysis I can find a record however is from February of this year.  He has a history of paroxysmal atrial fibrillation and is on chronic anticoagulation with Eliquis....  On initially asking him what he initiated his arrival at Hospital he points to the suprapubic and lower abdominal area and indicates that there is pain.  When asked specifically about pain in that area however he shrugs his shoulders.  I asked him again where he is hurting he mouths the words \"all over\".  Unfortunately I was not successful in getting much of anything more specific out of him.  Another answer that he would periodically gives is \"my wife can tell you that\".  There is a given history of back pain and a fall.  When asked about back pain he then shook his head yes and pointed to the lower back area.  This is below the area of the abnormalities on the CT scan however.        The patient was admitted the hospital and seen by multiple consultants.  He also had some swelling in his penis and foreskin and urology saw him and recommended he keep Chandler in for a while and do a voiding trial later.  He did not require any surgical intervention for his compression fracture but was still very weak and his wife could not take care of him at home.  The plan is to go to skilled nursing facility for some rehabilitation until he is a little stronger and can get back home with home care.  He'll continue with his tube feedings and most his medications as before and " trach care as before.  He follow-ups with ENT for his trach.    Consults:     Consults     Date and Time Order Name Status Description    9/21/2018 1522 Inpatient Urology Consult Completed     9/15/2018 0953 Inpatient Cardiology Consult Completed     9/15/2018 0953 Inpatient Infectious Diseases Consult Completed     9/15/2018 0953 Inpatient Pulmonology Consult Completed     9/15/2018 0158 Inpatient Pulmonology Consult Completed     9/14/2018 2202 Inpatient Neurosurgery Consult      9/14/2018 2119 [facility] (on-call MD unless specified) Completed           Results from last 7 days  Lab Units 09/23/18  0615   WBC 10*3/mm3 5.96   HEMOGLOBIN g/dL 10.6*   HEMATOCRIT % 33.7*   PLATELETS 10*3/mm3 218       Results from last 7 days  Lab Units 09/23/18  0615   SODIUM mmol/L 137   POTASSIUM mmol/L 4.6   CHLORIDE mmol/L 97*   CO2 mmol/L 31.1*   BUN mg/dL 17   CREATININE mg/dL 0.80   GLUCOSE mg/dL 121*   CALCIUM mg/dL 9.3     Significant Diagnostic Studies:   Lab Results   Component Value Date    WBC 5.96 09/23/2018    HGB 10.6 (L) 09/23/2018    HCT 33.7 (L) 09/23/2018     09/23/2018     Lab Results   Component Value Date     09/23/2018    K 4.6 09/23/2018    CL 97 (L) 09/23/2018    CO2 31.1 (H) 09/23/2018    BUN 17 09/23/2018    CREATININE 0.80 09/23/2018    GLUCOSE 121 (H) 09/23/2018     Lab Results   Component Value Date    CALCIUM 9.3 09/23/2018     Lab Results   Component Value Date    AST 31 09/21/2018    ALT 35 09/21/2018    ALKPHOS 123 (H) 09/21/2018     No results found for: APTT, INR  No results found for: COLORU, CLARITYU, SPECGRAV, PHUR, PROTEINUR, GLUCOSEU, KETONESU, BLOODU, NITRITE, LEUKOCYTESUR, BILIRUBINUR, UROBILINOGEN, RBCUA, WBCUA, BACTERIA, UACOMMENT  No results found for: TROPONINT, TROPONINI, BNP  No components found for: HGBA1C;2  No components found for: TSH;2  Imaging Results (all)     Procedure Component Value Units Date/Time    XR Abdomen KUB [[id number]] Collected:  09/21/18 1836     Updated:   09/21/18 1840    Narrative:       ONE VIEW ABDOMEN     HISTORY: Abdominal distention. Recent spine surgery.     The patient had a previous examination 5 days ago prior to the spine  surgery and this demonstrated a very large amount of stool throughout  the colon. On the current exam there remains a large amount of stool in  the right colon and moderate gaseous distention of the entire colon  combined with mild distention of gas-filled small bowel loops and the  appearance is most consistent with moderate postoperative ileus.     This report was finalized on 9/21/2018 6:36 PM by Dr. Homer Romero M.D.       XR Spine Lumbar 4+ View [[id number]] Collected:  09/20/18 2105     Updated:  09/21/18 0903    Narrative:       X-RAY LUMBAR SPINE 4 VIEWS.     HISTORY: Fusion.     COMPARISON: No prior studies for comparison.     FINDINGS:  Under fluoroscopy, posterior fusion was performed through the articular  screws and rods.         Soft tissue detail is suboptimal.        Impression:       Posterior fusion under fluoroscopy guidance.     Fluoroscopy time is 16 seconds.  Number of images is 388.         This report was finalized on 9/21/2018 2:45 AM by Dr. Elaine Oconnell M.D.       FL O Arm During Surgery [[id number]] Collected:  09/20/18 2105     Updated:  09/21/18 0903    Narrative:       X-RAY LUMBAR SPINE 4 VIEWS.     HISTORY: Fusion.     COMPARISON: No prior studies for comparison.     FINDINGS:  Under fluoroscopy, posterior fusion was performed through the articular  screws and rods.         Soft tissue detail is suboptimal.        Impression:       Posterior fusion under fluoroscopy guidance.     Fluoroscopy time is 16 seconds.  Number of images is 388.         This report was finalized on 9/21/2018 2:45 AM by Dr. Elaine Oconnell M.D.       XR Abdomen KUB [[id number]] Collected:  09/17/18 0703     Updated:  09/18/18 2107    Narrative:       SINGLE VIEW ABDOMEN/KUB     HISTORY:G tube placement; S32.010A-Wedge compression  fracture of first  lumbar vertebra, initial encounter for closed fracture; N39.0-Urinary  tract infection, site not specified; A49.8-Other bacterial infections of  unspecified site; Z16.12-Extended spectrum beta lactamase (ESBL)  resistance     COMPARISON: 6:02 PM.     FINDINGS: 2 portable images of the abdomen obtained. The second image  was obtained following hand injection of sterile water-soluble contrast  through the pre-existing gastrostomy tube. The injected contrast appears  to reside intraluminally in the distal stomach and small bowel without  extravasation observed. Abundant fecal material suggests constipation..             Impression:       Feeding tube as above.     This report was finalized on 9/18/2018 9:04 PM by Adiel Clark M.D.       XR Spine Lumbar 2 or 3 View [[id number]] Collected:  09/18/18 1607     Updated:  09/18/18 1644    Narrative:       THREE-VIEW LUMBAR SPINE     HISTORY: Follow-up of recent multilevel fusion.     The patient has had recent posterior fusion extending from T12 to L2  with posterior plates and pedicle screws, and the alignment appears  satisfactory. There is prominent anterior spurring at L2-3, L3-4, and  L4-5 as well as severe spurring of the posterior facets in the lower  lumbar spine.     There is a very large amount of stool in the partially visualized colon  suggesting an element of constipation.     This report was finalized on 9/18/2018 4:41 PM by Dr. Homer Romero M.D.       XR Abdomen KUB [[id number]] Collected:  09/16/18 1826     Updated:  09/16/18 1830    Narrative:       ABDOMEN KUB     CLINICAL HISTORY: Check feeding tube placement.     A single supine view was obtained. There is moderately large amount  stool within the colon. No air-filled distended small bowel is  identified. A gastrostomy tube is in place with its tip projecting over  the left upper quadrant. Its tip is most likely in the stomach. A repeat  abdomen x-ray after injecting oral contrast  through the tube would  better assess its actual location.     This report was finalized on 9/16/2018 6:27 PM by Dr. Lg Stiles M.D.       XR Chest 1 View [[id number]] Collected:  09/15/18 1435     Updated:  09/15/18 1440    Narrative:       XR CHEST 1 VW-     HISTORY: Male who is 88 years-old,  preoperative evaluation     TECHNIQUE: Frontal view of the chest     COMPARISON: 9/14/2018     FINDINGS: Tracheostomy tube appears stable. Sternotomy wires are noted.  Heart size is borderline. Pulmonary vasculature is unremarkable. No  focal pulmonary consolidation, pleural effusion, or pneumothorax. Old  granulomatous disease is apparent. No acute osseous process.       Impression:       No focal pulmonary consolidation. Follow-up as clinically  indicated.     This report was finalized on 9/15/2018 2:37 PM by Dr. Rakesh Barba M.D.       CT Lumbar Spine Without Contrast [[id number]] Collected:  09/14/18 2348     Updated:  09/15/18 0328    Narrative:       CT LUMBAR SPINE WITHOUT CONTRAST.     TECHNIQUE: Radiation dose reduction techniques were utilized, including  automated exposure control and exposure modulation based on body size.  Multiple axial images of the lumbar spine with coronal and sagittal  reconstructions.     HISTORY:  New L1 compression fracture.     COMPARISON:  No prior studies for comparison.     FINDINGS:   There is an acute compression fracture of L1, 30% loss of vertebral body  height. No retropulsed fracture fragment.  Loss of intervertebral disc  height most prominent at L1-2.     Multilevel spurring. Mild to moderate facet joint disease.     Prevertebral soft tissue is unremarkable.      Gallstones, no evidence for acute cholecystitis.       Impression:       1.  Acute compression fracture of L1, 20% loss of vertebral body height.  No retropulsed fracture fragment.  2.  Mild spondylosis of the lumbar spine.  3.  Incidental note of gallstones without evidence for cholecystitis.     This  report was finalized on 9/15/2018 3:24 AM by Dr. Elaine Oconnell M.D.       XR Chest 2 View [[id number]] Collected:  09/14/18 2044     Updated:  09/14/18 2053    Narrative:       XR CHEST 2 VW-     HISTORY: Male who is 88 years-old,  short of breath     TECHNIQUE: Frontal and lateral views of the chest     COMPARISON: 1/19/2018     FINDINGS: Tracheostomy tube is noted. Sternotomy wires are seen. Heart  size is borderline. Aorta is tortuous, calcified. Old granulomatous  disease is seen. Pulmonary vasculature is unremarkable. No focal  pulmonary consolidation, pleural effusion, or pneumothorax. L1  compression fracture with 40% loss of height anteriorly is a change from  8/28/2018.       Impression:       1. No focal pulmonary consolidation. Borderline heart size. Tortuous  aorta. Follow-up as clinically indicated.  2. L1 compression deformity, change from 8/28/2018.     This report was finalized on 9/14/2018 8:50 PM by Dr. Rakesh Barba M.D.           Lab Results (last 7 days)     Procedure Component Value Units Date/Time    POC Glucose Once [[id number]]  (Abnormal) Collected:  09/23/18 1118    Specimen:  Blood Updated:  09/23/18 1121     Glucose 203 (H) mg/dL     Narrative:       Meter: [id number] : [id number] Smailagic Yue CNA    Basic Metabolic Panel [[id number]]  (Abnormal) Collected:  09/23/18 0615    Specimen:  Blood Updated:  09/23/18 0653     Glucose 121 (H) mg/dL      BUN 17 mg/dL      Creatinine 0.80 mg/dL      Sodium 137 mmol/L      Potassium 4.6 mmol/L      Chloride 97 (L) mmol/L      CO2 31.1 (H) mmol/L      Calcium 9.3 mg/dL      eGFR Non African Amer 91 mL/min/1.73      BUN/Creatinine Ratio 21.3     Anion Gap 8.9 mmol/L     Narrative:       The MDRD GFR formula is only valid for adults with stable renal function between ages 18 and 70.    CBC & Differential [[id number]] Collected:  09/23/18 0615    Specimen:  Blood Updated:  09/23/18 0634    Narrative:       The following orders were created  for panel order CBC & Differential.  Procedure                               Abnormality         Status                     ---------                               -----------         ------                     CBC Auto Differential[[id number]]        Abnormal            Final result                 Please view results for these tests on the individual orders.    CBC Auto Differential [[id number]]  (Abnormal) Collected:  09/23/18 0615    Specimen:  Blood Updated:  09/23/18 0634     WBC 5.96 10*3/mm3      RBC 3.25 (L) 10*6/mm3      Hemoglobin 10.6 (L) g/dL      Hematocrit 33.7 (L) %      .7 (H) fL      MCH 32.6 pg      MCHC 31.5 (L) g/dL      RDW 12.8 %      RDW-SD 48.7 fl      MPV 10.2 fL      Platelets 218 10*3/mm3      Neutrophil % 67.0 %      Lymphocyte % 21.1 %      Monocyte % 10.2 %      Eosinophil % 1.5 %      Basophil % 0.2 %      Immature Grans % 0.0 %      Neutrophils, Absolute 3.99 10*3/mm3      Lymphocytes, Absolute 1.26 10*3/mm3      Monocytes, Absolute 0.61 10*3/mm3      Eosinophils, Absolute 0.09 10*3/mm3      Basophils, Absolute 0.01 10*3/mm3      Immature Grans, Absolute 0.00 10*3/mm3     POC Glucose Once [[id number]]  (Abnormal) Collected:  09/22/18 2115    Specimen:  Blood Updated:  09/22/18 2117     Glucose 182 (H) mg/dL     Narrative:       Meter: [id number] : [id number] Agata RIOJAS    POC Glucose Once [[id number]]  (Abnormal) Collected:  09/22/18 1747    Specimen:  Blood Updated:  09/22/18 1748     Glucose 263 (H) mg/dL     Narrative:       Meter: [id number] : [id number] [employer] NA    POC Glucose Once [[id number]]  (Abnormal) Collected:  09/22/18 1021    Specimen:  Blood Updated:  09/22/18 1022     Glucose 251 (H) mg/dL     Narrative:       Meter: [id number] : [id number] [employer] NA    Basic Metabolic Panel [[id number]]  (Abnormal) Collected:  09/22/18 0708    Specimen:  Blood Updated:  09/22/18 0802     Glucose 120 (H) mg/dL      BUN 16 mg/dL      Creatinine 0.72  (L) mg/dL      Sodium 135 (L) mmol/L      Potassium 4.6 mmol/L      Chloride 97 (L) mmol/L      CO2 30.7 (H) mmol/L      Calcium 9.2 mg/dL      eGFR Non African Amer 103 mL/min/1.73      BUN/Creatinine Ratio 22.2     Anion Gap 7.3 mmol/L     Narrative:       The MDRD GFR formula is only valid for adults with stable renal function between ages 18 and 70.    CBC (No Diff) [[id number]]  (Abnormal) Collected:  09/22/18 0708    Specimen:  Blood Updated:  09/22/18 0746     WBC 5.92 10*3/mm3      RBC 2.94 (L) 10*6/mm3      Hemoglobin 9.5 (L) g/dL      Hematocrit 30.7 (L) %      .4 (H) fL      MCH 32.3 pg      MCHC 30.9 (L) g/dL      RDW 12.9 %      RDW-SD 49.0 fl      MPV 10.4 fL      Platelets 211 10*3/mm3     POC Glucose Once [[id number]]  (Normal) Collected:  09/22/18 0614    Specimen:  Blood Updated:  09/22/18 0616     Glucose 127 mg/dL     Narrative:       Meter: [id number] : [id number] Sandra RIOJAS    POC Glucose Once [[id number]]  (Abnormal) Collected:  09/21/18 2152    Specimen:  Blood Updated:  09/21/18 2153     Glucose 211 (H) mg/dL     Narrative:       Meter: [id number] : [id number] Jean Carlos MULLIGAN    POC Glucose Once [[id number]]  (Normal) Collected:  09/21/18 1611    Specimen:  Blood Updated:  09/21/18 1612     Glucose 119 mg/dL     Narrative:       Meter: [id number] : [id number] Harry RIOJAS    Hepatic Function Panel [[id number]]  (Abnormal) Collected:  09/21/18 0453    Specimen:  Blood Updated:  09/21/18 1604     Total Protein 6.8 g/dL      Albumin 2.80 (L) g/dL      ALT (SGPT) 35 U/L      AST (SGOT) 31 U/L      Alkaline Phosphatase 123 (H) U/L      Total Bilirubin 0.4 mg/dL      Bilirubin, Direct <0.2 mg/dL      Bilirubin, Indirect -- mg/dL      Comment: Unable to calculate       POC Glucose Once [[id number]]  (Abnormal) Collected:  09/21/18 1127    Specimen:  Blood Updated:  09/21/18 1132     Glucose 245 (H) mg/dL     Narrative:       Meter: [id number] : [id number] Harry  Brittnee SHINE    Basic Metabolic Panel [[id number]]  (Abnormal) Collected:  09/21/18 0453    Specimen:  Blood Updated:  09/21/18 0551     Glucose 115 (H) mg/dL      BUN 19 mg/dL      Creatinine 0.76 mg/dL      Sodium 136 mmol/L      Potassium 5.2 mmol/L      Chloride 98 mmol/L      CO2 30.8 (H) mmol/L      Calcium 9.1 mg/dL      eGFR Non African Amer 97 mL/min/1.73      BUN/Creatinine Ratio 25.0     Anion Gap 7.2 mmol/L     Narrative:       The MDRD GFR formula is only valid for adults with stable renal function between ages 18 and 70.    POC Glucose Once [[id number]]  (Abnormal) Collected:  09/21/18 0525    Specimen:  Blood Updated:  09/21/18 0526     Glucose 141 (H) mg/dL     Narrative:       Meter: [id number] : [id number] Raman Curry CNA    Hemoglobin & Hematocrit, Blood [[id number]]  (Abnormal) Collected:  09/20/18 2353    Specimen:  Blood Updated:  09/21/18 0012     Hemoglobin 9.6 (L) g/dL      Hematocrit 30.7 (L) %     POC Glucose Once [[id number]]  (Abnormal) Collected:  09/20/18 2043    Specimen:  Blood Updated:  09/20/18 2046     Glucose 168 (H) mg/dL     Narrative:       Meter: [id number] : [id number] Raman Curry CNA    Hemoglobin & Hematocrit, Blood [[id number]]  (Abnormal) Collected:  09/20/18 1603    Specimen:  Blood Updated:  09/20/18 1653     Hemoglobin 10.7 (L) g/dL      Hematocrit 34.2 (L) %     POC Glucose Once [[id number]]  (Abnormal) Collected:  09/20/18 1621    Specimen:  Blood Updated:  09/20/18 1624     Glucose 223 (H) mg/dL     Narrative:       Meter: [id number] : [id number] Luca RIOJAS    POC Glucose Once [[id number]]  (Abnormal) Collected:  09/20/18 1137    Specimen:  Blood Updated:  09/20/18 1141     Glucose 257 (H) mg/dL     Narrative:       Meter: [id number] : [id number] Luca RIOJAS    Respiratory Culture - Aspirate, ET Suction [[id number]]  (Abnormal)  (Susceptibility) Collected:  09/16/18 1928    Specimen:  Aspirate from ET Suction Updated:  09/20/18 0740      Respiratory Culture Heavy growth (4+) Pseudomonas aeruginosa (A)      Heavy growth (4+) Providencia rettgeri (A)      No Normal Respiratory Yanira (A)     Gram Stain Result Rare (1+) WBCs seen      Rare (1+) Epithelial cells per low power field      Few (2+) Gram negative bacilli    Susceptibility      Pseudomonas aeruginosa    Providencia rettgeri       MERI    MERI       Amikacin 4 ug/ml Susceptible             Ampicillin       <=2 ug/ml Resistant       Ampicillin + Sulbactam       <=2 ug/ml Susceptible       Cefazolin       >=64 ug/ml Resistant       Cefepime 2 ug/ml Susceptible    <=1 ug/ml Susceptible       Cefoxitin       <=4 ug/ml Susceptible       Ceftazidime 4 ug/ml Susceptible             Ceftriaxone       <=1 ug/ml Susceptible       Ciprofloxacin <=0.25 ug/ml Susceptible             Gentamicin 2 ug/ml Susceptible    <=1 ug/ml Susceptible       Levofloxacin 1 ug/ml Susceptible    <=0.12 ug/ml Susceptible       Meropenem 1 ug/ml Susceptible             Piperacillin + Tazobactam 8 ug/ml Susceptible    <=4 ug/ml Susceptible       Tobramycin <=1 ug/ml Susceptible             Trimethoprim + Sulfamethoxazole       >=320 ug/ml Resistant                      POC Glucose Once [[id number]]  (Normal) Collected:  09/20/18 0632    Specimen:  Blood Updated:  09/20/18 0633     Glucose 96 mg/dL     Narrative:       Meter: [id number] : [id number] Lavinia MULLIGAN    CBC (No Diff) [[id number]]  (Abnormal) Collected:  09/20/18 0506    Specimen:  Blood Updated:  09/20/18 0617     WBC 7.34 10*3/mm3      RBC 2.68 (L) 10*6/mm3      Hemoglobin 8.6 (L) g/dL      Hematocrit 28.3 (L) %      .6 (H) fL      MCH 32.1 pg      MCHC 30.4 (L) g/dL      RDW 13.3 %      RDW-SD 50.5 fl      MPV 10.4 fL      Platelets 189 10*3/mm3     Hemoglobin & Hematocrit, Blood [[id number]]  (Abnormal) Collected:  09/20/18 0009    Specimen:  Blood Updated:  09/20/18 0036     Hemoglobin 9.6 (L) g/dL      Hematocrit 30.4 (L) %     POC Glucose Once  [[id number]]  (Abnormal) Collected:  09/19/18 2037    Specimen:  Blood Updated:  09/19/18 2040     Glucose 191 (H) mg/dL     Narrative:       Meter: [id number] : [id number] Lavinia MULLIGAN    Blood Culture - Blood, [[id number]]  (Normal) Collected:  09/14/18 2002    Specimen:  Blood from Arm, Right Updated:  09/19/18 2030     Blood Culture No growth at 5 days    Blood Culture - Blood, [[id number]]  (Normal) Collected:  09/14/18 2009    Specimen:  Blood from Arm, Left Updated:  09/19/18 2030     Blood Culture No growth at 5 days    Hemoglobin & Hematocrit, Blood [[id number]]  (Abnormal) Collected:  09/19/18 1634    Specimen:  Blood Updated:  09/19/18 1657     Hemoglobin 10.2 (L) g/dL      Hematocrit 32.6 (L) %     POC Glucose Once [[id number]]  (Abnormal) Collected:  09/19/18 1634    Specimen:  Blood Updated:  09/19/18 1637     Glucose 209 (H) mg/dL     Narrative:       Meter: [id number] : [id number] Smailagic Berina CNA    POC Glucose Once [[id number]]  (Abnormal) Collected:  09/19/18 1117    Specimen:  Blood Updated:  09/19/18 1119     Glucose 242 (H) mg/dL     Narrative:       Meter: [id number] : [id number] Smailagic Berina CNA    CBC (No Diff) [[id number]]  (Abnormal) Collected:  09/19/18 0500    Specimen:  Blood Updated:  09/19/18 0627     WBC 10.09 10*3/mm3      RBC 3.00 (L) 10*6/mm3      Hemoglobin 9.9 (L) g/dL      Hematocrit 31.8 (L) %      .0 (H) fL      MCH 33.0 (H) pg      MCHC 31.1 (L) g/dL      RDW 13.4 %      RDW-SD 50.7 fl      MPV 10.5 fL      Platelets 218 10*3/mm3     POC Glucose Once [[id number]]  (Normal) Collected:  09/19/18 0608    Specimen:  Blood Updated:  09/19/18 0610     Glucose 122 mg/dL     Narrative:       Meter: [id number] : [id number] Godwin RIOJAS    POC Glucose Once [[id number]]  (Abnormal) Collected:  09/18/18 2126    Specimen:  Blood Updated:  09/18/18 2127     Glucose 203 (H) mg/dL     Narrative:       Meter: [id number] : [id number] Godwin RIOJAS    POC  Glucose Once [[id number]]  (Abnormal) Collected:  09/18/18 1600    Specimen:  Blood Updated:  09/18/18 1602     Glucose 167 (H) mg/dL     Narrative:       Meter: [id number] : [id number] Smailagic Berina CNA    POC Glucose Once [[id number]]  (Abnormal) Collected:  09/18/18 1049    Specimen:  Blood Updated:  09/18/18 1053     Glucose 237 (H) mg/dL     Narrative:       Meter: [id number] : [id number] Smailagic Berina CNA    Basic Metabolic Panel [[id number]]  (Abnormal) Collected:  09/18/18 0523    Specimen:  Blood Updated:  09/18/18 0623     Glucose 152 (H) mg/dL      BUN 23 mg/dL      Creatinine 0.70 (L) mg/dL      Sodium 134 (L) mmol/L      Potassium 4.7 mmol/L      Chloride 98 mmol/L      CO2 26.9 mmol/L      Calcium 9.0 mg/dL      eGFR Non African Amer 106 mL/min/1.73      BUN/Creatinine Ratio 32.9 (H)     Anion Gap 9.1 mmol/L     Narrative:       The MDRD GFR formula is only valid for adults with stable renal function between ages 18 and 70.    CBC & Differential [[id number]] Collected:  09/18/18 0523    Specimen:  Blood Updated:  09/18/18 0557    Narrative:       The following orders were created for panel order CBC & Differential.  Procedure                               Abnormality         Status                     ---------                               -----------         ------                     Scan Slide[[id number]]                                                                  CBC Auto Differential[[id number]]        Abnormal            Final result                 Please view results for these tests on the individual orders.    CBC Auto Differential [[id number]]  (Abnormal) Collected:  09/18/18 0523    Specimen:  Blood Updated:  09/18/18 0557     WBC 9.23 10*3/mm3      RBC 3.42 (L) 10*6/mm3      Hemoglobin 11.1 (L) g/dL      Hematocrit 36.3 (L) %      .1 (H) fL      MCH 32.5 pg      MCHC 30.6 (L) g/dL      RDW 13.1 %      RDW-SD 50.9 fl      MPV 10.3 fL      Platelets 255 10*3/mm3       Neutrophil % 75.5 %      Lymphocyte % 13.5 (L) %      Monocyte % 10.2 %      Eosinophil % 0.5 %      Basophil % 0.1 %      Immature Grans % 0.2 %      Neutrophils, Absolute 6.96 10*3/mm3      Lymphocytes, Absolute 1.25 10*3/mm3      Monocytes, Absolute 0.94 10*3/mm3      Eosinophils, Absolute 0.05 10*3/mm3      Basophils, Absolute 0.01 10*3/mm3      Immature Grans, Absolute 0.02 10*3/mm3     POC Glucose Once [[id number]]  (Abnormal) Collected:  09/18/18 0545    Specimen:  Blood Updated:  09/18/18 0550     Glucose 157 (H) mg/dL     Narrative:       Meter: [id number] : [id number] Agata RIOJAS    POC Glucose Once [[id number]]  (Abnormal) Collected:  09/17/18 2051    Specimen:  Blood Updated:  09/17/18 2052     Glucose 143 (H) mg/dL     Narrative:       Meter: [id number] : [id number] Agata RIOJAS    POC Glucose Once [[id number]]  (Normal) Collected:  09/17/18 1516    Specimen:  Blood Updated:  09/17/18 1517     Glucose 109 mg/dL     Narrative:       Meter: [id number] : [id number] Jeff MORGAN RN    POC Glucose Once [[id number]]  (Normal) Collected:  09/17/18 1137    Specimen:  Blood Updated:  09/17/18 1142     Glucose 115 mg/dL     Narrative:       Meter: [id number] : [id number] Harry RIOJAS    Urine Culture - Urine, [[id number]]  (Abnormal)  (Susceptibility) Collected:  09/14/18 2013    Specimen:  Urine from Urine, Catheter Updated:  09/17/18 0658     Urine Culture >100,000 CFU/mL Staphylococcus aureus, MRSA (A)     Comment:   Methicillin resistant Staph aureus, patient may be an isolation risk.       Susceptibility      Staphylococcus aureus, MRSA     MERI     Nitrofurantoin <=16 ug/ml Susceptible     Oxacillin >=4 ug/ml Resistant     Penicillin G >=0.5 ug/ml Resistant     Rifampin <=0.5 ug/ml Susceptible     Tetracycline >=16 ug/ml Resistant     Trimethoprim + Sulfamethoxazole >=320 ug/ml Resistant     Vancomycin 1 ug/ml Susceptible                    POC Glucose Once [[id number]]   "(Abnormal) Collected:  09/17/18 0611    Specimen:  Blood Updated:  09/17/18 0612     Glucose 133 (H) mg/dL     Narrative:       Meter: [id number] : [id number] Godwin RIOJAS    POC Glucose Once [[id number]]  (Normal) Collected:  09/16/18 2109    Specimen:  Blood Updated:  09/16/18 2110     Glucose 110 mg/dL     Narrative:       Meter: [id number] : [id number] Godwin White NA    POC Glucose Once [[id number]]  (Normal) Collected:  09/16/18 1716    Specimen:  Blood Updated:  09/16/18 1717     Glucose 110 mg/dL     Narrative:       Meter: [id number] : [id number] Kt Alcocer SHINE        /66 (BP Location: Left arm, Patient Position: Lying)   Pulse 99   Temp 98.1 °F (36.7 °C) (Oral)   Resp 18   Ht 175.3 cm (69.02\")   Wt 81.5 kg (179 lb 10.8 oz)   SpO2 93%   BMI 26.52 kg/m²     Discharge Exam:  General Appearance:    Alert, cooperative, no distress                          Head:    Normocephalic, without obvious abnormality, atraumatic                          Eyes:                            Throat:   Lips, tongue, gums normal                          Neck:   Supple, symmetrical, trachea midline, no JVD                        Lungs:     Clear to auscultation bilaterally, respirations unlabored                Chest Wall:    No tenderness or deformity                        Heart:    Regular rate and rhythm, S1 and S2 normal, no murmur,no  Rub or gallop                  Abdomen:     Soft, non-tender, has G-tube, bowel sounds active, no masses, no organomegaly                  Extremities:   Extremities normal, atraumatic, no cyanosis or edema                             Skin:   Skin is warm and dry,  no rashes or palpable lesions                  Neurologic:   He is very weak     Disposition:  Skilled nursing facility    Patient Instructions:      Discharge Medications      New Medications      Instructions Start Date   acetaminophen 160 MG/5ML solution  Commonly known as:  TYLENOL   650 mg, Per G " Tube, Every 4 Hours PRN      docusate sodium 100 MG capsule   100 mg, Oral, 2 Times Daily PRN      HYDROcodone-acetaminophen 5-325 MG per tablet  Commonly known as:  NORCO   1 tablet, Oral, Every 6 Hours PRN         Continue These Medications      Instructions Start Date   albuterol (5 MG/ML) 0.5% nebulizer solution  Commonly known as:  PROVENTIL   2.5 mg, Nebulization, Every 4 Hours PRN      albuterol (5 MG/ML) 0.5% nebulizer solution  Commonly known as:  PROVENTIL   5 mg, Nebulization, 2 Times Daily - RT      apixaban 5 MG tablet tablet  Commonly known as:  ELIQUIS   5 mg, Per G Tube, 2 Times Daily      atorvastatin 40 MG tablet  Commonly known as:  LIPITOR   40 mg, Per G Tube, Daily      budesonide 0.5 MG/2ML nebulizer solution  Commonly known as:  PULMICORT   0.5 mg, Nebulization, 2 Times Daily - RT      finasteride 5 MG tablet  Commonly known as:  PROSCAR   5 mg, Per G Tube, Daily      JANUMET  MG per tablet  Generic drug:  sitaGLIPtin-metFORMIN   1 tablet, Per G Tube, 2 Times Daily With Meals      lacosamide 50 MG tablet tablet  Commonly known as:  VIMPAT   100 mg, Per G Tube, Every 12 Hours Scheduled      levETIRAcetam 750 MG tablet  Commonly known as:  KEPPRA   1,500 mg, Per G Tube, 2 Times Daily      levothyroxine 75 MCG tablet  Commonly known as:  SYNTHROID, LEVOTHROID   75 mcg, Per G Tube, Daily      midodrine 10 MG tablet  Commonly known as:  PROAMATINE   10 mg, Per G Tube, 3 Times Daily      tiotropium 18 MCG per inhalation capsule  Commonly known as:  SPIRIVA   1 capsule, Inhalation, Daily - RT         Stop These Medications    linezolid 600 MG tablet  Commonly known as:  ZYVOX          No future appointments.  Follow-up Information     Jose Daniel Tyler MD Follow up.    Specialty:  Neurosurgery  Why:  10/12 at 1145 AM  Contact information:  [address]  [place] [id number] [phone]             Suman Jaimes III, MD Follow up.    Specialty:  Internal Medicine  Why:  After  released from rehabilitation  Contact information:  [address] [address]  [phone]                 Discharge Order     Start     Ordered    09/23/18 1357  Discharge patient  Once     Expected Discharge Date:  09/23/18    Discharge Disposition:  Skilled Nursing Facility (DC - External)    Physician of Record for Attribution - Please select from Treatment Team:  JUANY THAKKAR LOC [[id number]]    Review needed by CMO to determine Physician of Record:  No       Question Answer Comment   Physician of Record for Attribution - Please select from Treatment Team JUANY TAHKKAR    Review needed by CMO to determine Physician of Record No        09/23/18 1357          Total time spent discharging patient including evaluation,post hospitalization follow up,  medication and post hospitalization instructions and education total time exceeds 30 minutes.    Signed:  Adalid Roberts MD  9/23/2018  2:42 PM

## 2018-09-23 NOTE — PLAN OF CARE
Problem: Patient Care Overview  Goal: Plan of Care Review  Outcome: Ongoing (interventions implemented as appropriate)   09/23/18 0534   Plan of Care Review   Progress no change   OTHER   Outcome Summary Pt VSS on 6 L Tracheostomy collar frequent suctioning provided, No recorded BM since 16th with Abdomen distention, Suppository provided with large voiding resulted, Pt passing gas, Q2 turns skin care and barrier cream applied to excoriated skin on buttocks

## 2018-09-23 NOTE — PROGRESS NOTES
[facility]      Mar/chart reviewed  F/u respiratory failure, tracheostomy  +secretions requiring suctioning    Vital Sign Min/Max for last 24 hours  Temp  Min: 97.8 °F (36.6 °C)  Max: 98.3 °F (36.8 °C)   BP  Min: 137/78  Max: 160/79   Pulse  Min: 89  Max: 117   Resp  Min: 16  Max: 20   SpO2  Min: 91 %  Max: 96 %   Flow (L/min)  Min: 6  Max: 28   No Data Recorded     Appears ill , sleepy  perrl, eomi, no icterus,  mmm, no jvd, trachea midline, neck supple,  chest decrased, some coarse bilaterally, no crackles, no wheezes,   rrr,   soft, nt, some distended +bs,  no c/c/ 1+ edema     Labs:  Wbc 5.92  hgb 9.5  pls 211  Cr 0.72  Bicarb 30        PCCM Problems  Chronic Tracheostomy with trach collar  Ex-smoker with possible underlying COPD  Chronic dysphagia with PEG feeding tube  Abdominal distention  Swelling of the penis  Relevant Medical Diagnoses  Lumbar compression fracture pending intervention  PAF  Diabetes  Chronic hypotension on midodrine     Plan of Treatment  Keep with cuffed trach for now.  He plans to see ENT as an outpatient.  They will address appropriate trach. I am also happy to switch it back to a cuff-less #6 Shiley if so required.     Continue nebs.  Ok with me to d/c anytime

## 2018-09-23 NOTE — SIGNIFICANT NOTE
09/23/18 1519   Rehab Treatment   Discipline physical therapist   Reason Treatment Not Performed other (see comments)  (Per Chelsie GANDHI, pt to DC to SNF this afternoon.)   Recommendation   PT - Next Appointment 09/24/18

## 2018-09-24 VITALS
SYSTOLIC BLOOD PRESSURE: 160 MMHG | HEART RATE: 95 BPM | RESPIRATION RATE: 20 BRPM | TEMPERATURE: 98.4 F | OXYGEN SATURATION: 97 % | BODY MASS INDEX: 25.04 KG/M2 | WEIGHT: 169.09 LBS | HEIGHT: 69 IN | DIASTOLIC BLOOD PRESSURE: 85 MMHG

## 2018-09-24 LAB — GLUCOSE BLDC GLUCOMTR-MCNC: 106 MG/DL (ref 70–130)

## 2018-09-24 PROCEDURE — 94799 UNLISTED PULMONARY SVC/PX: CPT

## 2018-09-24 PROCEDURE — 82962 GLUCOSE BLOOD TEST: CPT

## 2018-09-24 RX ADMIN — IPRATROPIUM BROMIDE AND ALBUTEROL SULFATE 3 ML: .5; 3 SOLUTION RESPIRATORY (INHALATION) at 05:52

## 2018-09-24 RX ADMIN — LEVOTHYROXINE SODIUM 75 MCG: 75 TABLET ORAL at 06:10

## 2018-09-24 RX ADMIN — BUDESONIDE 0.5 MG: 0.5 INHALANT RESPIRATORY (INHALATION) at 05:52

## 2018-09-24 NOTE — PROGRESS NOTES
Case Management Discharge Note    Final Note: Pt was dc'd to a skilled bed @ [facility] over weekend    Destination - Selection Complete     Service Request Status Selected Specialties Address Phone Number Fax Number    [facility] [address] [place] [id number] [phone]      Durable Medical Equipment     No service has been selected for the patient.      Dialysis/Infusion     No service has been selected for the patient.      Home Medical Care     No service has been selected for the patient.      Social Care     No service has been selected for the patient.        Ambulance: [facility]    Final Discharge Disposition Code: 03 - skilled nursing facility (SNF)

## 2018-09-24 NOTE — NURSING NOTE
[facility] ambulance called and stated they would not be able to get patient at 2245 and the earliest they could possibly get him would be around 0300. Patient refusing to leave at that time. Pick-up time rescheduled for 0600. MD, Dr. Stone, patients spouse and [facility] notified of later .

## 2018-10-10 ENCOUNTER — APPOINTMENT (OUTPATIENT)
Dept: CT IMAGING | Facility: HOSPITAL | Age: 83
End: 2018-10-10

## 2018-10-10 ENCOUNTER — HOSPITAL ENCOUNTER (INPATIENT)
Facility: HOSPITAL | Age: 83
LOS: 4 days | Discharge: NURSING, REHAB OR LONG-TERM CARE | End: 2018-10-14
Attending: EMERGENCY MEDICINE | Admitting: INTERNAL MEDICINE

## 2018-10-10 DIAGNOSIS — J18.9 HEALTHCARE-ASSOCIATED PNEUMONIA: Primary | ICD-10-CM

## 2018-10-10 DIAGNOSIS — N30.90 CYSTITIS: ICD-10-CM

## 2018-10-10 DIAGNOSIS — R10.84 GENERALIZED ABDOMINAL PAIN: ICD-10-CM

## 2018-10-10 PROBLEM — Z97.8 CHRONIC INDWELLING FOLEY CATHETER: Chronic | Status: ACTIVE | Noted: 2018-10-10

## 2018-10-10 LAB
ALBUMIN SERPL-MCNC: 3.4 G/DL (ref 3.5–5.2)
ALBUMIN/GLOB SERPL: 0.8 G/DL
ALP SERPL-CCNC: 161 U/L (ref 39–117)
ALT SERPL W P-5'-P-CCNC: 14 U/L (ref 1–41)
ANION GAP SERPL CALCULATED.3IONS-SCNC: 8.2 MMOL/L
AST SERPL-CCNC: 19 U/L (ref 1–40)
BACTERIA UR QL AUTO: ABNORMAL /HPF
BASOPHILS # BLD AUTO: 0.01 10*3/MM3 (ref 0–0.2)
BASOPHILS NFR BLD AUTO: 0.1 % (ref 0–1.5)
BILIRUB SERPL-MCNC: 0.2 MG/DL (ref 0.1–1.2)
BILIRUB UR QL STRIP: NEGATIVE
BUN BLD-MCNC: 27 MG/DL (ref 8–23)
BUN/CREAT SERPL: 31.8 (ref 7–25)
CALCIUM SPEC-SCNC: 9.9 MG/DL (ref 8.6–10.5)
CHLORIDE SERPL-SCNC: 94 MMOL/L (ref 98–107)
CLARITY UR: CLEAR
CO2 SERPL-SCNC: 34.8 MMOL/L (ref 22–29)
COLOR UR: YELLOW
CREAT BLD-MCNC: 0.85 MG/DL (ref 0.76–1.27)
D-LACTATE SERPL-SCNC: 1.7 MMOL/L (ref 0.5–2)
DEPRECATED RDW RBC AUTO: 53.2 FL (ref 37–54)
EOSINOPHIL # BLD AUTO: 0.13 10*3/MM3 (ref 0–0.7)
EOSINOPHIL NFR BLD AUTO: 1.9 % (ref 0.3–6.2)
ERYTHROCYTE [DISTWIDTH] IN BLOOD BY AUTOMATED COUNT: 14 % (ref 11.5–14.5)
GFR SERPL CREATININE-BSD FRML MDRD: 85 ML/MIN/1.73
GLOBULIN UR ELPH-MCNC: 4.4 GM/DL
GLUCOSE BLD-MCNC: 88 MG/DL (ref 65–99)
GLUCOSE BLDC GLUCOMTR-MCNC: 103 MG/DL (ref 70–130)
GLUCOSE BLDC GLUCOMTR-MCNC: 111 MG/DL (ref 70–130)
GLUCOSE UR STRIP-MCNC: NEGATIVE MG/DL
HCT VFR BLD AUTO: 34.4 % (ref 40.4–52.2)
HGB BLD-MCNC: 11 G/DL (ref 13.7–17.6)
HGB UR QL STRIP.AUTO: ABNORMAL
HYALINE CASTS UR QL AUTO: ABNORMAL /LPF
IMM GRANULOCYTES # BLD: 0.02 10*3/MM3 (ref 0–0.03)
IMM GRANULOCYTES NFR BLD: 0.3 % (ref 0–0.5)
KETONES UR QL STRIP: NEGATIVE
LEUKOCYTE ESTERASE UR QL STRIP.AUTO: ABNORMAL
LIPASE SERPL-CCNC: 60 U/L (ref 13–60)
LYMPHOCYTES # BLD AUTO: 1.4 10*3/MM3 (ref 0.9–4.8)
LYMPHOCYTES NFR BLD AUTO: 20.9 % (ref 19.6–45.3)
MAGNESIUM SERPL-MCNC: 2.4 MG/DL (ref 1.6–2.4)
MCH RBC QN AUTO: 33.6 PG (ref 27–32.7)
MCHC RBC AUTO-ENTMCNC: 32 G/DL (ref 32.6–36.4)
MCV RBC AUTO: 105.2 FL (ref 79.8–96.2)
MONOCYTES # BLD AUTO: 0.46 10*3/MM3 (ref 0.2–1.2)
MONOCYTES NFR BLD AUTO: 6.9 % (ref 5–12)
NEUTROPHILS # BLD AUTO: 4.71 10*3/MM3 (ref 1.9–8.1)
NEUTROPHILS NFR BLD AUTO: 70.2 % (ref 42.7–76)
NITRITE UR QL STRIP: NEGATIVE
PH UR STRIP.AUTO: 8.5 [PH] (ref 5–8)
PLATELET # BLD AUTO: 402 10*3/MM3 (ref 140–500)
PMV BLD AUTO: 9.8 FL (ref 6–12)
POTASSIUM BLD-SCNC: 4.4 MMOL/L (ref 3.5–5.2)
PROCALCITONIN SERPL-MCNC: 0.15 NG/ML (ref 0.1–0.25)
PROT SERPL-MCNC: 7.8 G/DL (ref 6–8.5)
PROT UR QL STRIP: ABNORMAL
RBC # BLD AUTO: 3.27 10*6/MM3 (ref 4.6–6)
RBC # UR: ABNORMAL /HPF
REF LAB TEST METHOD: ABNORMAL
S PNEUM AG SPEC QL LA: NEGATIVE
SODIUM BLD-SCNC: 137 MMOL/L (ref 136–145)
SP GR UR STRIP: 1.02 (ref 1–1.03)
SQUAMOUS #/AREA URNS HPF: ABNORMAL /HPF
UROBILINOGEN UR QL STRIP: ABNORMAL
WBC NRBC COR # BLD: 6.71 10*3/MM3 (ref 4.5–10.7)
WBC UR QL AUTO: ABNORMAL /HPF

## 2018-10-10 PROCEDURE — 99284 EMERGENCY DEPT VISIT MOD MDM: CPT

## 2018-10-10 PROCEDURE — 94640 AIRWAY INHALATION TREATMENT: CPT

## 2018-10-10 PROCEDURE — 82962 GLUCOSE BLOOD TEST: CPT

## 2018-10-10 PROCEDURE — 87899 AGENT NOS ASSAY W/OPTIC: CPT | Performed by: INTERNAL MEDICINE

## 2018-10-10 PROCEDURE — 87205 SMEAR GRAM STAIN: CPT | Performed by: INTERNAL MEDICINE

## 2018-10-10 PROCEDURE — 25010000002 PIPERACILLIN SOD-TAZOBACTAM PER 1 G: Performed by: EMERGENCY MEDICINE

## 2018-10-10 PROCEDURE — 83605 ASSAY OF LACTIC ACID: CPT | Performed by: EMERGENCY MEDICINE

## 2018-10-10 PROCEDURE — 25010000002 IOPAMIDOL 61 % SOLUTION: Performed by: EMERGENCY MEDICINE

## 2018-10-10 PROCEDURE — 87186 SC STD MICRODIL/AGAR DIL: CPT | Performed by: INTERNAL MEDICINE

## 2018-10-10 PROCEDURE — 84145 PROCALCITONIN (PCT): CPT | Performed by: EMERGENCY MEDICINE

## 2018-10-10 PROCEDURE — 83690 ASSAY OF LIPASE: CPT | Performed by: EMERGENCY MEDICINE

## 2018-10-10 PROCEDURE — 81001 URINALYSIS AUTO W/SCOPE: CPT | Performed by: EMERGENCY MEDICINE

## 2018-10-10 PROCEDURE — 51702 INSERT TEMP BLADDER CATH: CPT

## 2018-10-10 PROCEDURE — 80053 COMPREHEN METABOLIC PANEL: CPT | Performed by: EMERGENCY MEDICINE

## 2018-10-10 PROCEDURE — 83735 ASSAY OF MAGNESIUM: CPT | Performed by: EMERGENCY MEDICINE

## 2018-10-10 PROCEDURE — 87077 CULTURE AEROBIC IDENTIFY: CPT | Performed by: INTERNAL MEDICINE

## 2018-10-10 PROCEDURE — 94799 UNLISTED PULMONARY SVC/PX: CPT

## 2018-10-10 PROCEDURE — 74177 CT ABD & PELVIS W/CONTRAST: CPT

## 2018-10-10 PROCEDURE — 25010000002 VANCOMYCIN 10 G RECONSTITUTED SOLUTION: Performed by: EMERGENCY MEDICINE

## 2018-10-10 PROCEDURE — 87070 CULTURE OTHR SPECIMN AEROBIC: CPT | Performed by: INTERNAL MEDICINE

## 2018-10-10 PROCEDURE — 87040 BLOOD CULTURE FOR BACTERIA: CPT | Performed by: EMERGENCY MEDICINE

## 2018-10-10 PROCEDURE — 36415 COLL VENOUS BLD VENIPUNCTURE: CPT

## 2018-10-10 PROCEDURE — 85025 COMPLETE CBC W/AUTO DIFF WBC: CPT | Performed by: EMERGENCY MEDICINE

## 2018-10-10 RX ORDER — ALBUTEROL SULFATE 2.5 MG/3ML
2.5 SOLUTION RESPIRATORY (INHALATION) ONCE AS NEEDED
Status: COMPLETED | OUTPATIENT
Start: 2018-10-10 | End: 2018-10-12

## 2018-10-10 RX ORDER — ALBUTEROL SULFATE 2.5 MG/3ML
2.5 SOLUTION RESPIRATORY (INHALATION) EVERY 6 HOURS PRN
Status: DISCONTINUED | OUTPATIENT
Start: 2018-10-10 | End: 2018-10-11

## 2018-10-10 RX ORDER — BISACODYL 10 MG
10 SUPPOSITORY, RECTAL RECTAL DAILY PRN
Status: DISCONTINUED | OUTPATIENT
Start: 2018-10-10 | End: 2018-10-14 | Stop reason: HOSPADM

## 2018-10-10 RX ORDER — ONDANSETRON 2 MG/ML
4 INJECTION INTRAMUSCULAR; INTRAVENOUS EVERY 6 HOURS PRN
Status: DISCONTINUED | OUTPATIENT
Start: 2018-10-10 | End: 2018-10-14 | Stop reason: HOSPADM

## 2018-10-10 RX ORDER — LACOSAMIDE 100 MG/1
100 TABLET ORAL EVERY 12 HOURS SCHEDULED
Status: DISCONTINUED | OUTPATIENT
Start: 2018-10-10 | End: 2018-10-14 | Stop reason: HOSPADM

## 2018-10-10 RX ORDER — ACETAMINOPHEN 160 MG/5ML
650 SOLUTION ORAL EVERY 4 HOURS PRN
Status: DISCONTINUED | OUTPATIENT
Start: 2018-10-10 | End: 2018-10-14 | Stop reason: HOSPADM

## 2018-10-10 RX ORDER — FINASTERIDE 5 MG/1
5 TABLET, FILM COATED ORAL DAILY
Status: DISCONTINUED | OUTPATIENT
Start: 2018-10-10 | End: 2018-10-10 | Stop reason: SDUPTHER

## 2018-10-10 RX ORDER — LEVOTHYROXINE SODIUM 0.07 MG/1
75 TABLET ORAL
Status: DISCONTINUED | OUTPATIENT
Start: 2018-10-11 | End: 2018-10-14 | Stop reason: HOSPADM

## 2018-10-10 RX ORDER — LEVETIRACETAM 100 MG/ML
1500 SOLUTION ORAL EVERY 12 HOURS SCHEDULED
Status: DISCONTINUED | OUTPATIENT
Start: 2018-10-10 | End: 2018-10-14 | Stop reason: HOSPADM

## 2018-10-10 RX ORDER — DOCUSATE SODIUM 100 MG/1
100 CAPSULE, LIQUID FILLED ORAL 2 TIMES DAILY PRN
Status: DISCONTINUED | OUTPATIENT
Start: 2018-10-10 | End: 2018-10-14 | Stop reason: HOSPADM

## 2018-10-10 RX ORDER — SODIUM CHLORIDE 9 MG/ML
125 INJECTION, SOLUTION INTRAVENOUS CONTINUOUS
Status: DISCONTINUED | OUTPATIENT
Start: 2018-10-10 | End: 2018-10-12

## 2018-10-10 RX ORDER — MIDODRINE HYDROCHLORIDE 5 MG/1
10 TABLET ORAL 3 TIMES DAILY
Status: DISCONTINUED | OUTPATIENT
Start: 2018-10-10 | End: 2018-10-14 | Stop reason: HOSPADM

## 2018-10-10 RX ORDER — SODIUM CHLORIDE 0.9 % (FLUSH) 0.9 %
1-10 SYRINGE (ML) INJECTION AS NEEDED
Status: DISCONTINUED | OUTPATIENT
Start: 2018-10-10 | End: 2018-10-14 | Stop reason: HOSPADM

## 2018-10-10 RX ORDER — IPRATROPIUM BROMIDE AND ALBUTEROL SULFATE 2.5; .5 MG/3ML; MG/3ML
3 SOLUTION RESPIRATORY (INHALATION)
Status: DISCONTINUED | OUTPATIENT
Start: 2018-10-10 | End: 2018-10-10

## 2018-10-10 RX ORDER — BUDESONIDE 0.5 MG/2ML
0.5 INHALANT ORAL
Status: DISCONTINUED | OUTPATIENT
Start: 2018-10-10 | End: 2018-10-14 | Stop reason: HOSPADM

## 2018-10-10 RX ORDER — NICOTINE POLACRILEX 4 MG
15 LOZENGE BUCCAL
Status: DISCONTINUED | OUTPATIENT
Start: 2018-10-10 | End: 2018-10-14 | Stop reason: HOSPADM

## 2018-10-10 RX ORDER — DEXTROSE MONOHYDRATE 25 G/50ML
25 INJECTION, SOLUTION INTRAVENOUS
Status: DISCONTINUED | OUTPATIENT
Start: 2018-10-10 | End: 2018-10-14 | Stop reason: HOSPADM

## 2018-10-10 RX ORDER — SODIUM CHLORIDE 0.9 % (FLUSH) 0.9 %
3 SYRINGE (ML) INJECTION EVERY 12 HOURS SCHEDULED
Status: DISCONTINUED | OUTPATIENT
Start: 2018-10-10 | End: 2018-10-14 | Stop reason: HOSPADM

## 2018-10-10 RX ORDER — ATORVASTATIN CALCIUM 20 MG/1
40 TABLET, FILM COATED ORAL DAILY
Status: DISCONTINUED | OUTPATIENT
Start: 2018-10-10 | End: 2018-10-14 | Stop reason: HOSPADM

## 2018-10-10 RX ORDER — ACETAMINOPHEN 325 MG/1
650 TABLET ORAL EVERY 6 HOURS PRN
Status: DISCONTINUED | OUTPATIENT
Start: 2018-10-10 | End: 2018-10-14 | Stop reason: HOSPADM

## 2018-10-10 RX ORDER — ONDANSETRON 4 MG/1
4 TABLET, ORALLY DISINTEGRATING ORAL EVERY 6 HOURS PRN
Status: DISCONTINUED | OUTPATIENT
Start: 2018-10-10 | End: 2018-10-14 | Stop reason: HOSPADM

## 2018-10-10 RX ORDER — ONDANSETRON 4 MG/1
4 TABLET, FILM COATED ORAL EVERY 6 HOURS PRN
Status: DISCONTINUED | OUTPATIENT
Start: 2018-10-10 | End: 2018-10-14 | Stop reason: HOSPADM

## 2018-10-10 RX ORDER — CALCIUM CARBONATE 200(500)MG
1 TABLET,CHEWABLE ORAL 2 TIMES DAILY PRN
Status: DISCONTINUED | OUTPATIENT
Start: 2018-10-10 | End: 2018-10-14 | Stop reason: HOSPADM

## 2018-10-10 RX ORDER — SODIUM CHLORIDE 0.9 % (FLUSH) 0.9 %
10 SYRINGE (ML) INJECTION AS NEEDED
Status: DISCONTINUED | OUTPATIENT
Start: 2018-10-10 | End: 2018-10-14 | Stop reason: HOSPADM

## 2018-10-10 RX ORDER — NALOXONE HCL 0.4 MG/ML
0.4 VIAL (ML) INJECTION
Status: DISCONTINUED | OUTPATIENT
Start: 2018-10-10 | End: 2018-10-14 | Stop reason: HOSPADM

## 2018-10-10 RX ORDER — NITROGLYCERIN 0.4 MG/1
0.4 TABLET SUBLINGUAL
Status: DISCONTINUED | OUTPATIENT
Start: 2018-10-10 | End: 2018-10-14 | Stop reason: HOSPADM

## 2018-10-10 RX ADMIN — ALBUTEROL SULFATE 5 MG: 2.5 SOLUTION RESPIRATORY (INHALATION) at 20:30

## 2018-10-10 RX ADMIN — SODIUM CHLORIDE 125 ML/HR: 9 INJECTION, SOLUTION INTRAVENOUS at 14:32

## 2018-10-10 RX ADMIN — TAZOBACTAM SODIUM AND PIPERACILLIN SODIUM 4.5 G: 500; 4 INJECTION, SOLUTION INTRAVENOUS at 23:21

## 2018-10-10 RX ADMIN — TAZOBACTAM SODIUM AND PIPERACILLIN SODIUM 4.5 G: 500; 4 INJECTION, SOLUTION INTRAVENOUS at 15:58

## 2018-10-10 RX ADMIN — LACOSAMIDE 100 MG: 100 TABLET, FILM COATED ORAL at 21:38

## 2018-10-10 RX ADMIN — IOPAMIDOL 85 ML: 612 INJECTION, SOLUTION INTRAVENOUS at 14:40

## 2018-10-10 RX ADMIN — MIDODRINE HYDROCHLORIDE 10 MG: 5 TABLET ORAL at 21:37

## 2018-10-10 RX ADMIN — LEVETIRACETAM 1500 MG: 100 SOLUTION ORAL at 21:38

## 2018-10-10 RX ADMIN — SODIUM CHLORIDE 500 ML: 9 INJECTION, SOLUTION INTRAVENOUS at 13:45

## 2018-10-10 RX ADMIN — VANCOMYCIN HYDROCHLORIDE 1500 MG: 10 INJECTION, POWDER, LYOPHILIZED, FOR SOLUTION INTRAVENOUS at 17:33

## 2018-10-10 RX ADMIN — APIXABAN 5 MG: 5 TABLET, FILM COATED ORAL at 21:38

## 2018-10-10 RX ADMIN — BUDESONIDE 0.5 MG: 0.5 INHALANT RESPIRATORY (INHALATION) at 20:30

## 2018-10-10 RX ADMIN — SODIUM CHLORIDE 125 ML/HR: 9 INJECTION, SOLUTION INTRAVENOUS at 20:15

## 2018-10-10 RX ADMIN — ATORVASTATIN CALCIUM 40 MG: 20 TABLET, FILM COATED ORAL at 21:37

## 2018-10-10 NOTE — ED PROVIDER NOTES
EMERGENCY DEPARTMENT ENCOUNTER    CHIEF COMPLAINT  Chief Complaint: Lower abd pain  History given by: pt  History limited by: none  Room Number: 11/11  PMD: Suman Jaimes III, MD      HPI:  Pt is a 88 y.o. male who presents complaining of lower abd pain since Saturday. Pt is taking pian medication for his back currently. Per spouse, pt has hx of constipation, and does not remember his last BM.. Pt denies nausea and vomiting. Pt states that the pain is constant. Pt has hx of recent spine surgery for his low back because of persistent pain.. Pt has had G-tube for some time.  Patient has a trach that he has had for a long time as well.  Patient has a chronic cough from the trach.  She states that he has felt warm and his wife said that he needed a biopsy and to help him off.  There is no known documented fever.    Duration:  5 days  Onset: gradual  Timing: constant  Location: lower abd  Radiation: n/a   Quality: n/a  Intensity/Severity: moderate  Progression: worsened  Associated Symptoms: constipation  Aggravating Factors: n/a  Alleviating Factors: n/a  Previous Episodes:  Pt has hx of recent lumbar fracture repair. Pt has had J-tube for a long time.  Treatment before arrival: n/a    PAST MEDICAL HISTORY  Active Ambulatory Problems     Diagnosis Date Noted   • Chest pain (non-cardiac) 12/29/2017   • Atrial fibrillation (CMS/[facility]) 12/29/2017   • C. difficile diarrhea 12/29/2017   • Coronary artery disease 12/29/2017   • Diabetes mellitus (CMS/[facility]) 12/29/2017   • Disease of thyroid gland 12/29/2017   • Hypertension 12/29/2017   • Pressure ulcer of sacral region 12/29/2017   • Ankylosing spondylitis lumbar region (CMS/[facility]) 12/29/2017   • Neurogenic bladder due to ankylosing spondylitis 12/29/2017   • Tracheostomy in place (CMS/[facility]) 12/29/2017   • PEG (percutaneous endoscopic gastrostomy) status (CMS/[facility]) 12/29/2017   • Acute UTI 01/19/2018   • Sepsis (CMS/[facility]) 01/19/2018   • Clostridium difficile colitis 01/22/2018   •  Anticoagulated 01/31/2018   • Closed compression fracture of L1 lumbar vertebra (CMS/[facility]) 09/14/2018   • Tracheostomy status (CMS/[facility]) 09/15/2018   • Seizures (CMS/[facility]) 09/15/2018   • PAF (paroxysmal atrial fibrillation) (CMS/[facility]) 09/15/2018   • Neuromuscular dysfunction of bladder 09/15/2018   • Hypothyroidism 09/15/2018   • Gastrostomy tube dependent (CMS/[facility]) 09/15/2018   • COPD (chronic obstructive pulmonary disease) (CMS/HCC) 09/15/2018   • CAD (coronary artery disease) 09/15/2018   • AV block, 1st degree 09/15/2018   • Bifascicular block 09/15/2018   • Macrocytic anemia 09/15/2018   • Diabetes type 2, controlled (CMS/HCC) 09/15/2018   • History of Clostridium difficile colitis 09/15/2018   • Anticoagulated 09/15/2018     Resolved Ambulatory Problems     Diagnosis Date Noted   • Hyperkalemia 09/15/2018     Past Medical History:   Diagnosis Date   • Ankylosing spondylitis lumbar region (CMS/[facility])    • BPH (benign prostatic hyperplasia)    • C. difficile diarrhea    • Constipation    • Coronary artery disease    • Diabetes mellitus (CMS/[facility])    • Dysphagia    • Hyperlipidemia    • Hypertension    • Hypothyroidism    • MRSA infection    • Neuromuscular dysfunction of bladder    • PAF (paroxysmal atrial fibrillation) (CMS/HCC)    • Pain    • Pressure ulcer of sacral region    • Seizures (CMS/HCC)    • Tracheostomy status (CMS/HCC)        PAST SURGICAL HISTORY  Past Surgical History:   Procedure Laterality Date   • CARDIAC SURGERY     • CORONARY ARTERY BYPASS GRAFT      x 5   • GTUBE INSERTION     • LUMBAR DISCECTOMY FUSION INSTRUMENTATION N/A 9/17/2018    Procedure: T12-L2 POSTERIOR ARTHODESIS FUSION WITH MEDTRONIC NAVIGATION;  Surgeon: Jose Daniel Tyler MD;  Location: [facility];  Service: Neurosurgery   • TRACHEOSTOMY         FAMILY HISTORY  History reviewed. No pertinent family history.    SOCIAL HISTORY  Social History     Social History   • Marital status:      Spouse name: N/A   • Number of  children: N/A   • Years of education: N/A     Occupational History   • Not on file.     Social History Main Topics   • Smoking status: Former Smoker   • Smokeless tobacco: Never Used   • Alcohol use No   • Drug use: No   • Sexual activity: Defer     Other Topics Concern   • Not on file     Social History Narrative   • No narrative on file       ALLERGIES  Patient has no known allergies.    REVIEW OF SYSTEMS  Review of Systems   Constitutional: Negative for activity change, appetite change and fever.   HENT: Negative for congestion and sore throat.    Eyes: Negative.    Respiratory: Negative for cough and shortness of breath.    Cardiovascular: Negative for chest pain and leg swelling.   Gastrointestinal: Positive for abdominal pain (lower) and constipation. Negative for diarrhea and vomiting.   Endocrine: Negative.    Genitourinary: Negative for decreased urine volume and dysuria.   Musculoskeletal: Negative for neck pain.   Skin: Negative for rash and wound.   Allergic/Immunologic: Negative.    Neurological: Negative for weakness, numbness and headaches.   Hematological: Negative.    Psychiatric/Behavioral: Negative.    All other systems reviewed and are negative.      PHYSICAL EXAM  ED Triage Vitals [10/10/18 1304]   Temp Heart Rate Resp BP SpO2   97.7 °F (36.5 °C) 91 -- 138/75 98 %      Temp src Heart Rate Source Patient Position BP Location FiO2 (%)   Tympanic Monitor -- -- --       Physical Exam   Constitutional: He is oriented to person, place, and time. No distress.   Vision is elderly and frail and chronically ill-appearing.   HENT:   Head: Normocephalic and atraumatic.   Patient has some dry oral mucosa.   Eyes: Pupils are equal, round, and reactive to light. EOM are normal.   Neck: Normal range of motion. Neck supple.   Cardiovascular: Normal rate, regular rhythm, normal heart sounds and intact distal pulses.    Pulmonary/Chest: Effort normal. No respiratory distress. He has rales (She has mild rales in  the bases of his lungs bilaterally.).   Trach is place with no bleeding   Abdominal: Soft. Bowel sounds are normal. He exhibits no distension. There is no tenderness. There is no rebound and no guarding.   J tube in place with no infection around it   Genitourinary: Rectal exam shows guaiac negative stool.   Genitourinary Comments: Grade one decubitus. Brown stool in the vault, no obvious impaction. Urinary catheter- actively draining   Musculoskeletal: Normal range of motion. He exhibits no edema.   Muscular atrophy to the lower extremities.  To lower thoracic spine and upper lumbar patient has an incision that is stapled that is clean dry and intact.  There is no discharge and there is no signs of infection.   Neurological: He is alert and oriented to person, place, and time. He has normal sensation and normal strength. No cranial nerve deficit.   Patient has chronic diffuse weakness most prominent in the lower extremities.  No sensory changes.  These neurologic changes are chronic and there is no new focal weakness.   Skin: Skin is warm and dry.   Psychiatric: Mood and affect normal.   Nursing note and vitals reviewed.      LAB RESULTS  Lab Results (last 24 hours)     Procedure Component Value Units Date/Time    CBC & Differential [[id number]] Collected:  10/10/18 1334    Specimen:  Blood Updated:  10/10/18 1352    Narrative:       The following orders were created for panel order CBC & Differential.  Procedure                               Abnormality         Status                     ---------                               -----------         ------                     Scan Slide[[id number]]                                                                  CBC Auto Differential[[id number]]        Abnormal            Final result                 Please view results for these tests on the individual orders.    Comprehensive Metabolic Panel [[id number]]  (Abnormal) Collected:  10/10/18 1334    Specimen:  Blood  Updated:  10/10/18 1416     Glucose 88 mg/dL      BUN 27 (H) mg/dL      Creatinine 0.85 mg/dL      Sodium 137 mmol/L      Potassium 4.4 mmol/L      Chloride 94 (L) mmol/L      CO2 34.8 (H) mmol/L      Calcium 9.9 mg/dL      Total Protein 7.8 g/dL      Albumin 3.40 (L) g/dL      ALT (SGPT) 14 U/L      AST (SGOT) 19 U/L      Alkaline Phosphatase 161 (H) U/L      Total Bilirubin 0.2 mg/dL      eGFR Non African Amer 85 mL/min/1.73      Globulin 4.4 gm/dL      A/G Ratio 0.8 g/dL      BUN/Creatinine Ratio 31.8 (H)     Anion Gap 8.2 mmol/L     Narrative:       The MDRD GFR formula is only valid for adults with stable renal function between ages 18 and 70.    Lipase [[id number]]  (Normal) Collected:  10/10/18 1334    Specimen:  Blood Updated:  10/10/18 1412     Lipase 60 U/L     Magnesium [[id number]]  (Normal) Collected:  10/10/18 1334    Specimen:  Blood Updated:  10/10/18 1412     Magnesium 2.4 mg/dL     CBC Auto Differential [[id number]]  (Abnormal) Collected:  10/10/18 1334    Specimen:  Blood Updated:  10/10/18 1352     WBC 6.71 10*3/mm3      RBC 3.27 (L) 10*6/mm3      Hemoglobin 11.0 (L) g/dL      Hematocrit 34.4 (L) %      .2 (H) fL      MCH 33.6 (H) pg      MCHC 32.0 (L) g/dL      RDW 14.0 %      RDW-SD 53.2 fl      MPV 9.8 fL      Platelets 402 10*3/mm3      Neutrophil % 70.2 %      Lymphocyte % 20.9 %      Monocyte % 6.9 %      Eosinophil % 1.9 %      Basophil % 0.1 %      Immature Grans % 0.3 %      Neutrophils, Absolute 4.71 10*3/mm3      Lymphocytes, Absolute 1.40 10*3/mm3      Monocytes, Absolute 0.46 10*3/mm3      Eosinophils, Absolute 0.13 10*3/mm3      Basophils, Absolute 0.01 10*3/mm3      Immature Grans, Absolute 0.02 10*3/mm3     Procalcitonin [[id number]]  (Normal) Collected:  10/10/18 1334    Specimen:  Blood Updated:  10/10/18 1537     Procalcitonin 0.15 ng/mL     Narrative:       As a Marker for Sepsis (Non-Neonates):   1. <0.5 ng/mL represents a low risk of severe sepsis and/or septic shock.  1.  ">2 ng/mL represents a high risk of severe sepsis and/or septic shock.    As a Marker for Lower Respiratory Tract Infections that require antibiotic therapy:  PCT on Admission     Antibiotic Therapy             6-12 Hrs later  > 0.5                Strongly Recommended            >0.25 - <0.5         Recommended  0.1 - 0.25           Discouraged                   Remeasure/reassess PCT  <0.1                 Strongly Discouraged          Remeasure/reassess PCT      As 28 day mortality risk marker: \"Change in Procalcitonin Result\" (> 80 % or <=80 %) if Day 0 (or Day 1) and Day 4 values are available. Refer to [email or web address]-[email or web address]/   Change in PCT <=80 %   A decrease of PCT levels below or equal to 80 % defines a positive change in PCT test result representing a higher risk for 28-day all-cause mortality of patients diagnosed with severe sepsis or septic shock.  Change in PCT > 80 %   A decrease of PCT levels of more than 80 % defines a negative change in PCT result representing a lower risk for 28-day all-cause mortality of patients diagnosed with severe sepsis or septic shock.                Urinalysis With Microscopic If Indicated (No Culture) - Urine, Catheter [[id number]]  (Abnormal) Collected:  10/10/18 1342    Specimen:  Urine from Urine, Catheter Updated:  10/10/18 1403     Color, UA Yellow     Appearance, UA Clear     pH, UA 8.5 (H)     Specific Gravity, UA 1.019     Glucose, UA Negative     Ketones, UA Negative     Bilirubin, UA Negative     Blood, UA Trace (A)     Protein,  mg/dL (2+) (A)     Leuk Esterase, UA Moderate (2+) (A)     Nitrite, UA Negative     Urobilinogen, UA 0.2 E.U./dL    Urinalysis, Microscopic Only - Urine, Clean Catch [[id number]]  (Abnormal) Collected:  10/10/18 1342    Specimen:  Urine from Urine, Catheter Updated:  10/10/18 1403     RBC, UA 0-2 /HPF      WBC, UA 21-30 (A) /HPF      Bacteria, UA 4+ (A) /HPF      Squamous Epithelial Cells, UA 0-2 /HPF      Hyaline Casts, " UA 3-6 /LPF      Methodology Automated Microscopy    Lactic Acid, Plasma [[id number]]  (Normal) Collected:  10/10/18 1512    Specimen:  Blood Updated:  10/10/18 1544     Lactate 1.7 mmol/L     Blood Culture - Blood, [[id number]] Collected:  10/10/18 1512    Specimen:  Blood from Arm, Left Updated:  10/10/18 1526          I ordered the above labs and reviewed the results    RADIOLOGY  CT Abdomen Pelvis With Contrast    (Results Pending)        I ordered the above noted radiological studies. Interpreted by radiologist. Discussed with radiologist (Dr. Kennedy). Reviewed by me in PACS.       PROCEDURES  Procedures      PROGRESS AND CONSULTS    1325- Ordered I.V.F, CT abd, CMP, Lipase, UA, Mag, and CBC.    1455- Ordered blood culture, Lactic acid, Procal. Placed a call to A    1503- Ordered Vanc and Zosyn for infection and nausea.     1505- Rechecked pt. Pt is resting comfortably. Notified pt of radiology result that showed nothing cystitis. Discussed the plan to admit the pt to the hospital. Pt understands and agrees with the plan, all questions answered.    3:50 PM  I spoke with Dr. Griffiths who was will admit the patient to the hospital and will see down in the emergency department.      MEDICAL DECISION MAKING  Results were reviewed/discussed with the patient and they were also made aware of online access. Pt also made aware that some labs, such as cultures, will not be resulted during ER visit and follow up with PMD is necessary.     MDM  Number of Diagnoses or Management Options  HABP (hospital-acquired bacterial pneumonia):   Lower abdominal pain:      Amount and/or Complexity of Data Reviewed  Clinical lab tests: ordered and reviewed (UA- positive WBC (21-30)  Lipase- negative  Mag- negative)  Tests in the radiology section of CPT®: ordered and reviewed (CT abd- chronic changes but appears to have a new pnuemonia, and stranding around the bladder.)  Discussion of test results with the performing providers: yes (  "Brent)  Decide to obtain previous medical records or to obtain history from someone other than the patient: yes  Review and summarize past medical records: yes (Discharged 9/24- status post surgery of L1 vertebrae T12- L2 fusion by Dr. Tyler.   Hospital Course: Lam Viramontes  Is an 88-year-old gentleman with an extensive and very complicated medical history.  Unfortunately this point he is exceedingly vague as to what is acute issues are.  His chronic medical issues include severe dysphagia and he is G-tube dependent.  He also has a neurogenic bladder and is Chandler catheter dependent.  He has a history of seizure disorder.  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  There is a given history of previous MRSA infections as well as recurrent C. difficile colitis and of course recurrent UTIs with a chronic Chandler catheter.  His medication list includes Zyvox which reportedly was being given for ESBL UTI.  The last urinalysis I can find a record however is from February of this year.  He has a history of paroxysmal atrial fibrillation and is on chronic anticoagulation with Eliquis....  On initially asking him what he initiated his arrival at Hospital he points to the suprapubic and lower abdominal area and indicates that there is pain.  When asked specifically about pain in that area however he shrugs his shoulders.  I asked him again where he is hurting he mouths the words \"all over\".  Unfortunately I was not successful in getting much of anything more specific out of him.  Another answer that he would periodically gives is \"my wife can tell you that\".  There is a given history of back pain and a fall.  When asked about back pain he then shook his head yes and pointed to the lower back area.  This is below the area of the abnormalities on the CT scan however.        The patient was admitted the hospital and seen by multiple consultants.  He also had some swelling in his penis and foreskin and urology saw him and " recommended he keep Chandler in for a while and do a voiding trial later.  He did not require any surgical intervention for his compression fracture but was still very weak and his wife could not take care of him at home.  The plan is to go to skilled nursing facility for some rehabilitation until he is a little stronger and can get back home with home care.  He'll continue with his tube feedings and most his medications as before and trach care as before.  He follow-ups with ENT for his trach.)  Independent visualization of images, tracings, or specimens: yes           DIAGNOSIS  Final diagnoses:   Healthcare-associated pneumonia   Cystitis   Generalized abdominal pain       DISPOSITION  ADMISSION    Discussed treatment plan and reason for admission with pt/family and admitting physician.  Pt/family voiced understanding of the plan for admission for further testing/treatment as needed.         Latest Documented Vital Signs:  As of 3:50 PM  BP- 138/75 HR- 91 Temp- 97.7 °F (36.5 °C) (Tympanic) O2 sat- 98%    --  Documentation assistance provided by dirk Jimenez  for Dr. Mccauley .  Information recorded by the scribe was done at my direction and has been verified and validated by me.       Mike Jimenez  10/10/18 7670       Mitul Mccauley MD  10/10/18 2941

## 2018-10-10 NOTE — ED NOTES
Went to obtain blood culture-MD at bedside-will try back     Ness Mak  10/10/18 155       Ness Mak  10/10/18 0835

## 2018-10-10 NOTE — PROGRESS NOTES
"Pharmacy to Dose Vancomycin + Zosyn    Patient: Lam Viramontes (P477/1, [id number]  LOS: 0 days )  Relevant clinical data and objective history reviewed:  88 y.o. male 175.3 cm (69\") 66.6 kg (146 lb 12.8 oz)  Body mass index is 21.68 kg/m².  he has a past medical history of Ankylosing spondylitis lumbar region (CMS/[facility]); BPH (benign prostatic hyperplasia); C. difficile diarrhea; Constipation; Coronary artery disease; Diabetes mellitus (CMS/[facility]); Dysphagia; Hyperlipidemia; Hypertension; Hypothyroidism; MRSA infection; Neuromuscular dysfunction of bladder; PAF (paroxysmal atrial fibrillation) (CMS/[facility]); Pain; Pressure ulcer of sacral region; Seizures (CMS/[facility]); and Tracheostomy status (CMS/[facility]).    Day #1  Duration: 10 days  Consult for Dr Griffiths  Indication: pneumonia     Cultures:  10/10 Bloodx2  Sent  10/10 sputum  To be collected  10/10 S Pneumo To be collected    Radiology:  10/10 CT abdomen pelvis w/ contrast \"Acute left lower lobe pneumonia superimposed on chronic changes in the left lower lobe is suspected.\"    Other Abx: none    Results from last 7 days  Lab Units 10/10/18  1334   WBC 10*3/mm3 6.71   HEMOGLOBIN g/dL 11.0*   HEMATOCRIT % 34.4*   PLATELETS 10*3/mm3 402     Lab Results  Lab Value Date/Time   LACTATE 1.7 10/10/2018 1512   PROCALCITO 0.15 10/10/2018 1334     Temp (24hrs), Av.5 °F (36.4 °C), Min:97.3 °F (36.3 °C), Max:97.7 °F (36.5 °C)    Serum creatinine: 0.85 mg/dL 10/10/18 1334  Estimated creatinine clearance: 56.6 mL/min    IV contrast given? yes  10/10 1440    Assessment/Plan:   - Vanco 1500 mg IV x1 and Zosyn 4.5 gm IV x1 on admission    - 2018 ET aspirate grew pseudomonas resistant to ampicillin, cefazolin and tmp-smx    - zosyn 4.5 gm IV q8h EI per [facility] dosing guidelines (recent hx of MDRO)  - Vancomycin 750 mg IV q12h.   - Trough prior to the 5th dose (10/13 0430). Predicted 15-20.    - CBC/BMP in AM. Would consider MRSA nasal screen.    Prashanth Adorno, PharmD  10/10/18 " 5:28 PM

## 2018-10-10 NOTE — PLAN OF CARE
Problem: Pneumonia (Adult)  Goal: Signs and Symptoms of Listed Potential Problems Will be Absent, Minimized or Managed (Pneumonia)  Outcome: Outcome(s) achieved Date Met: 10/10/18

## 2018-10-11 ENCOUNTER — APPOINTMENT (OUTPATIENT)
Dept: GENERAL RADIOLOGY | Facility: HOSPITAL | Age: 83
End: 2018-10-11
Attending: INTERNAL MEDICINE

## 2018-10-11 ENCOUNTER — APPOINTMENT (OUTPATIENT)
Dept: CARDIOLOGY | Facility: HOSPITAL | Age: 83
End: 2018-10-11

## 2018-10-11 LAB
ANION GAP SERPL CALCULATED.3IONS-SCNC: 7.7 MMOL/L
B PERT DNA SPEC QL NAA+PROBE: NOT DETECTED
BASOPHILS # BLD AUTO: 0.01 10*3/MM3 (ref 0–0.2)
BASOPHILS NFR BLD AUTO: 0.1 % (ref 0–1.5)
BUN BLD-MCNC: 20 MG/DL (ref 8–23)
BUN/CREAT SERPL: 25 (ref 7–25)
C PNEUM DNA NPH QL NAA+NON-PROBE: NOT DETECTED
CALCIUM SPEC-SCNC: 8.9 MG/DL (ref 8.6–10.5)
CHLORIDE SERPL-SCNC: 100 MMOL/L (ref 98–107)
CO2 SERPL-SCNC: 29.3 MMOL/L (ref 22–29)
CREAT BLD-MCNC: 0.8 MG/DL (ref 0.76–1.27)
DEPRECATED RDW RBC AUTO: 54.7 FL (ref 37–54)
EOSINOPHIL # BLD AUTO: 0.24 10*3/MM3 (ref 0–0.7)
EOSINOPHIL NFR BLD AUTO: 3.1 % (ref 0.3–6.2)
ERYTHROCYTE [DISTWIDTH] IN BLOOD BY AUTOMATED COUNT: 14.3 % (ref 11.5–14.5)
FLUAV H1 2009 PAND RNA NPH QL NAA+PROBE: NOT DETECTED
FLUAV H1 HA GENE NPH QL NAA+PROBE: NOT DETECTED
FLUAV H3 RNA NPH QL NAA+PROBE: NOT DETECTED
FLUAV SUBTYP SPEC NAA+PROBE: NOT DETECTED
FLUBV RNA ISLT QL NAA+PROBE: NOT DETECTED
GFR SERPL CREATININE-BSD FRML MDRD: 91 ML/MIN/1.73
GLUCOSE BLD-MCNC: 151 MG/DL (ref 65–99)
GLUCOSE BLDC GLUCOMTR-MCNC: 122 MG/DL (ref 70–130)
GLUCOSE BLDC GLUCOMTR-MCNC: 161 MG/DL (ref 70–130)
GLUCOSE BLDC GLUCOMTR-MCNC: 168 MG/DL (ref 70–130)
GLUCOSE BLDC GLUCOMTR-MCNC: 208 MG/DL (ref 70–130)
HADV DNA SPEC NAA+PROBE: NOT DETECTED
HCOV 229E RNA SPEC QL NAA+PROBE: NOT DETECTED
HCOV HKU1 RNA SPEC QL NAA+PROBE: NOT DETECTED
HCOV NL63 RNA SPEC QL NAA+PROBE: NOT DETECTED
HCOV OC43 RNA SPEC QL NAA+PROBE: NOT DETECTED
HCT VFR BLD AUTO: 30.1 % (ref 40.4–52.2)
HGB BLD-MCNC: 9.5 G/DL (ref 13.7–17.6)
HMPV RNA NPH QL NAA+NON-PROBE: NOT DETECTED
HPIV1 RNA SPEC QL NAA+PROBE: NOT DETECTED
HPIV2 RNA SPEC QL NAA+PROBE: NOT DETECTED
HPIV3 RNA NPH QL NAA+PROBE: NOT DETECTED
HPIV4 P GENE NPH QL NAA+PROBE: NOT DETECTED
IMM GRANULOCYTES # BLD: 0.02 10*3/MM3 (ref 0–0.03)
IMM GRANULOCYTES NFR BLD: 0.3 % (ref 0–0.5)
LYMPHOCYTES # BLD AUTO: 1.45 10*3/MM3 (ref 0.9–4.8)
LYMPHOCYTES NFR BLD AUTO: 18.9 % (ref 19.6–45.3)
M PNEUMO IGG SER IA-ACNC: NOT DETECTED
MCH RBC QN AUTO: 33.7 PG (ref 27–32.7)
MCHC RBC AUTO-ENTMCNC: 31.6 G/DL (ref 32.6–36.4)
MCV RBC AUTO: 106.7 FL (ref 79.8–96.2)
MONOCYTES # BLD AUTO: 0.5 10*3/MM3 (ref 0.2–1.2)
MONOCYTES NFR BLD AUTO: 6.5 % (ref 5–12)
NEUTROPHILS # BLD AUTO: 5.48 10*3/MM3 (ref 1.9–8.1)
NEUTROPHILS NFR BLD AUTO: 71.4 % (ref 42.7–76)
PLATELET # BLD AUTO: 333 10*3/MM3 (ref 140–500)
PMV BLD AUTO: 9.7 FL (ref 6–12)
POTASSIUM BLD-SCNC: 4.1 MMOL/L (ref 3.5–5.2)
PROCALCITONIN SERPL-MCNC: 0.12 NG/ML (ref 0.1–0.25)
RBC # BLD AUTO: 2.82 10*6/MM3 (ref 4.6–6)
RHINOVIRUS RNA SPEC NAA+PROBE: NOT DETECTED
RSV RNA NPH QL NAA+NON-PROBE: NOT DETECTED
SODIUM BLD-SCNC: 137 MMOL/L (ref 136–145)
TROPONIN T SERPL-MCNC: <0.01 NG/ML (ref 0–0.03)
TROPONIN T SERPL-MCNC: <0.01 NG/ML (ref 0–0.03)
WBC NRBC COR # BLD: 7.68 10*3/MM3 (ref 4.5–10.7)

## 2018-10-11 PROCEDURE — 25010000002 HYDROMORPHONE 1 MG/ML SOLUTION: Performed by: INTERNAL MEDICINE

## 2018-10-11 PROCEDURE — 25010000002 INFLUENZA VAC SUBUNIT QUAD 0.5 ML SUSPENSION PREFILLED SYRINGE: Performed by: INTERNAL MEDICINE

## 2018-10-11 PROCEDURE — 94799 UNLISTED PULMONARY SVC/PX: CPT

## 2018-10-11 PROCEDURE — 97162 PT EVAL MOD COMPLEX 30 MIN: CPT

## 2018-10-11 PROCEDURE — 93226 XTRNL ECG REC<48 HR SCAN A/R: CPT

## 2018-10-11 PROCEDURE — 84484 ASSAY OF TROPONIN QUANT: CPT | Performed by: NURSE PRACTITIONER

## 2018-10-11 PROCEDURE — 87486 CHLMYD PNEUM DNA AMP PROBE: CPT | Performed by: INTERNAL MEDICINE

## 2018-10-11 PROCEDURE — 92610 EVALUATE SWALLOWING FUNCTION: CPT

## 2018-10-11 PROCEDURE — 85025 COMPLETE CBC W/AUTO DIFF WBC: CPT | Performed by: INTERNAL MEDICINE

## 2018-10-11 PROCEDURE — 71046 X-RAY EXAM CHEST 2 VIEWS: CPT

## 2018-10-11 PROCEDURE — 93005 ELECTROCARDIOGRAM TRACING: CPT | Performed by: INTERNAL MEDICINE

## 2018-10-11 PROCEDURE — 93010 ELECTROCARDIOGRAM REPORT: CPT | Performed by: INTERNAL MEDICINE

## 2018-10-11 PROCEDURE — 90661 CCIIV3 VAC ABX FR 0.5 ML IM: CPT | Performed by: INTERNAL MEDICINE

## 2018-10-11 PROCEDURE — 25010000002 VANCOMYCIN 750 MG RECONSTITUTED SOLUTION: Performed by: INTERNAL MEDICINE

## 2018-10-11 PROCEDURE — 63710000001 INSULIN ASPART PER 5 UNITS: Performed by: INTERNAL MEDICINE

## 2018-10-11 PROCEDURE — 25010000002 PIPERACILLIN SOD-TAZOBACTAM PER 1 G: Performed by: INTERNAL MEDICINE

## 2018-10-11 PROCEDURE — 97165 OT EVAL LOW COMPLEX 30 MIN: CPT

## 2018-10-11 PROCEDURE — 87798 DETECT AGENT NOS DNA AMP: CPT | Performed by: INTERNAL MEDICINE

## 2018-10-11 PROCEDURE — 99221 1ST HOSP IP/OBS SF/LOW 40: CPT | Performed by: INTERNAL MEDICINE

## 2018-10-11 PROCEDURE — 87633 RESP VIRUS 12-25 TARGETS: CPT | Performed by: INTERNAL MEDICINE

## 2018-10-11 PROCEDURE — 84145 PROCALCITONIN (PCT): CPT | Performed by: INTERNAL MEDICINE

## 2018-10-11 PROCEDURE — 87581 M.PNEUMON DNA AMP PROBE: CPT | Performed by: INTERNAL MEDICINE

## 2018-10-11 PROCEDURE — 82962 GLUCOSE BLOOD TEST: CPT

## 2018-10-11 PROCEDURE — 93225 XTRNL ECG REC<48 HRS REC: CPT

## 2018-10-11 PROCEDURE — 97110 THERAPEUTIC EXERCISES: CPT

## 2018-10-11 PROCEDURE — G0008 ADMIN INFLUENZA VIRUS VAC: HCPCS | Performed by: INTERNAL MEDICINE

## 2018-10-11 PROCEDURE — 87081 CULTURE SCREEN ONLY: CPT | Performed by: INTERNAL MEDICINE

## 2018-10-11 PROCEDURE — 80048 BASIC METABOLIC PNL TOTAL CA: CPT | Performed by: INTERNAL MEDICINE

## 2018-10-11 RX ORDER — DOXAZOSIN MESYLATE 4 MG/1
2 TABLET ORAL
Status: DISCONTINUED | OUTPATIENT
Start: 2018-10-11 | End: 2018-10-14 | Stop reason: HOSPADM

## 2018-10-11 RX ORDER — DIAZEPAM 5 MG/1
2.5 TABLET ORAL ONCE
Status: COMPLETED | OUTPATIENT
Start: 2018-10-11 | End: 2018-10-11

## 2018-10-11 RX ORDER — IPRATROPIUM BROMIDE AND ALBUTEROL SULFATE 2.5; .5 MG/3ML; MG/3ML
3 SOLUTION RESPIRATORY (INHALATION)
Status: DISCONTINUED | OUTPATIENT
Start: 2018-10-11 | End: 2018-10-14 | Stop reason: HOSPADM

## 2018-10-11 RX ADMIN — LINAGLIPTIN 5 MG: 5 TABLET, FILM COATED ORAL at 10:04

## 2018-10-11 RX ADMIN — TAZOBACTAM SODIUM AND PIPERACILLIN SODIUM 4.5 G: 500; 4 INJECTION, SOLUTION INTRAVENOUS at 15:32

## 2018-10-11 RX ADMIN — IPRATROPIUM BROMIDE AND ALBUTEROL SULFATE 3 ML: 2.5; .5 SOLUTION RESPIRATORY (INHALATION) at 20:03

## 2018-10-11 RX ADMIN — LACOSAMIDE 100 MG: 100 TABLET, FILM COATED ORAL at 10:05

## 2018-10-11 RX ADMIN — MIDODRINE HYDROCHLORIDE 10 MG: 5 TABLET ORAL at 10:04

## 2018-10-11 RX ADMIN — SODIUM CHLORIDE 750 MG: 900 INJECTION, SOLUTION INTRAVENOUS at 05:35

## 2018-10-11 RX ADMIN — SODIUM CHLORIDE 125 ML/HR: 9 INJECTION, SOLUTION INTRAVENOUS at 05:35

## 2018-10-11 RX ADMIN — BUDESONIDE 0.5 MG: 0.5 INHALANT RESPIRATORY (INHALATION) at 08:41

## 2018-10-11 RX ADMIN — INSULIN ASPART 2 UNITS: 100 INJECTION, SOLUTION INTRAVENOUS; SUBCUTANEOUS at 17:31

## 2018-10-11 RX ADMIN — LACOSAMIDE 100 MG: 100 TABLET, FILM COATED ORAL at 20:47

## 2018-10-11 RX ADMIN — SODIUM CHLORIDE 125 ML/HR: 9 INJECTION, SOLUTION INTRAVENOUS at 16:23

## 2018-10-11 RX ADMIN — MIDODRINE HYDROCHLORIDE 10 MG: 5 TABLET ORAL at 15:32

## 2018-10-11 RX ADMIN — INSULIN ASPART 3 UNITS: 100 INJECTION, SOLUTION INTRAVENOUS; SUBCUTANEOUS at 21:48

## 2018-10-11 RX ADMIN — DOXAZOSIN 2 MG: 4 TABLET ORAL at 15:28

## 2018-10-11 RX ADMIN — BUDESONIDE 0.5 MG: 0.5 INHALANT RESPIRATORY (INHALATION) at 20:03

## 2018-10-11 RX ADMIN — TAZOBACTAM SODIUM AND PIPERACILLIN SODIUM 4.5 G: 500; 4 INJECTION, SOLUTION INTRAVENOUS at 10:03

## 2018-10-11 RX ADMIN — POLYETHYLENE GLYCOL 3350 17 G: 17 POWDER, FOR SOLUTION ORAL at 20:47

## 2018-10-11 RX ADMIN — Medication 3 ML: at 20:47

## 2018-10-11 RX ADMIN — INSULIN ASPART 2 UNITS: 100 INJECTION, SOLUTION INTRAVENOUS; SUBCUTANEOUS at 10:05

## 2018-10-11 RX ADMIN — LEVOTHYROXINE SODIUM 75 MCG: 75 TABLET ORAL at 06:44

## 2018-10-11 RX ADMIN — SODIUM CHLORIDE 750 MG: 900 INJECTION, SOLUTION INTRAVENOUS at 17:30

## 2018-10-11 RX ADMIN — APIXABAN 5 MG: 5 TABLET, FILM COATED ORAL at 20:46

## 2018-10-11 RX ADMIN — Medication 3 ML: at 10:07

## 2018-10-11 RX ADMIN — LEVETIRACETAM 1500 MG: 100 SOLUTION ORAL at 10:04

## 2018-10-11 RX ADMIN — LEVETIRACETAM 1500 MG: 100 SOLUTION ORAL at 20:46

## 2018-10-11 RX ADMIN — HYDROMORPHONE HYDROCHLORIDE 0.25 MG: 1 INJECTION, SOLUTION INTRAMUSCULAR; INTRAVENOUS; SUBCUTANEOUS at 10:06

## 2018-10-11 RX ADMIN — MIDODRINE HYDROCHLORIDE 10 MG: 5 TABLET ORAL at 20:46

## 2018-10-11 RX ADMIN — ATORVASTATIN CALCIUM 40 MG: 20 TABLET, FILM COATED ORAL at 10:04

## 2018-10-11 RX ADMIN — INFLUENZA A VIRUS A/SINGAPORE/GP1908/2015 IVR-180 (H1N1) ANTIGEN (MDCK CELL DERIVED, PROPIOLACTONE INACTIVATED), INFLUENZA A VIRUS A/NORTH CAROLINA/04/2016 (H3N2) HEMAGGLUTININ ANTIGEN (MDCK CELL DERIVED, PROPIOLACTONE INACTIVATED), INFLUENZA B VIRUS B/IOWA/06/2017 HEMAGGLUTININ ANTIGEN (MDCK CELL DERIVED, PROPIOLACTONE INACTIVATED), INFLUENZA B VIRUS B/SINGAPORE/INFTT-16-0610/2016 HEMAGGLUTININ ANTIGEN (MDCK CELL DERIVED, PROPIOLACTONE INACTIVATED) 0.5 ML: 15; 15; 15; 15 INJECTION, SUSPENSION INTRAMUSCULAR at 13:10

## 2018-10-11 RX ADMIN — APIXABAN 5 MG: 5 TABLET, FILM COATED ORAL at 10:05

## 2018-10-11 RX ADMIN — HYDROMORPHONE HYDROCHLORIDE 0.25 MG: 1 INJECTION, SOLUTION INTRAMUSCULAR; INTRAVENOUS; SUBCUTANEOUS at 01:06

## 2018-10-11 RX ADMIN — DIAZEPAM 2.5 MG: 5 TABLET ORAL at 20:47

## 2018-10-11 NOTE — THERAPY EVALUATION
Acute Care - Occupational Therapy Initial Evaluation  [facility]     Patient Name: Lam Viramontes  : 1930  MRN: [id number]  Today's Date: 10/11/2018  Onset of Illness/Injury or Date of Surgery: 10/10/18  Date of Referral to OT: 10/10/18  Referring Physician: David    Admit Date: 10/10/2018       ICD-10-CM ICD-9-CM   1. Healthcare-associated pneumonia J18.9 486   2. Cystitis N30.90 595.9   3. Generalized abdominal pain R10.84 789.07     Patient Active Problem List   Diagnosis   • Chest pain (non-cardiac)   • Atrial fibrillation (CMS/[facility])   • C. difficile diarrhea   • Coronary artery disease   • Diabetes mellitus (CMS/[facility])   • Disease of thyroid gland   • Hypertension   • Pressure ulcer of sacral region   • Ankylosing spondylitis lumbar region (CMS/[facility])   • Neurogenic bladder due to ankylosing spondylitis   • Tracheostomy in place (CMS/[facility])   • PEG (percutaneous endoscopic gastrostomy) status (CMS/[facility])   • Acute UTI   • Sepsis (CMS/[facility])   • Clostridium difficile colitis   • Anticoagulated   • Closed compression fracture of L1 lumbar vertebra (CMS/[facility])   • Tracheostomy status (CMS/[facility])   • Seizures (CMS/[facility])   • PAF (paroxysmal atrial fibrillation) (CMS/[facility])   • Neuromuscular dysfunction of bladder   • Hypothyroidism   • Gastrostomy tube dependent (CMS/[facility])   • COPD (chronic obstructive pulmonary disease) (CMS/[facility])   • CAD (coronary artery disease)   • AV block, 1st degree   • Bifascicular block   • Macrocytic anemia   • Diabetes type 2, controlled (CMS/[facility])   • History of Clostridium difficile colitis   • Anticoagulated   • Healthcare-associated pneumonia   • Chronic indwelling Chandler catheter   • Cystitis     Past Medical History:   Diagnosis Date   • Ankylosing spondylitis lumbar region (CMS/[facility])    • BPH (benign prostatic hyperplasia)    • C. difficile diarrhea    • Constipation    • Coronary artery disease    • Diabetes mellitus (CMS/[facility])    • Dysphagia    • Hyperlipidemia    • Hypertension    •  Hypothyroidism    • MRSA infection    • Neuromuscular dysfunction of bladder    • PAF (paroxysmal atrial fibrillation) (CMS/[facility])    • Pain    • Pressure ulcer of sacral region    • Seizures (CMS/[facility])    • Tracheostomy status (CMS/[facility])      Past Surgical History:   Procedure Laterality Date   • CARDIAC SURGERY     • CORONARY ARTERY BYPASS GRAFT      x 5   • GTUBE INSERTION     • LUMBAR DISCECTOMY FUSION INSTRUMENTATION N/A 9/17/2018    Procedure: T12-L2 POSTERIOR ARTHODESIS FUSION WITH MEDTRON[employer] NAVIGATION;  Surgeon: Jose Daniel Tyler MD;  Location: [facility] OR;  Service: Neurosurgery   • TRACHEOSTOMY            OT ASSESSMENT FLOWSHEET (last 72 hours)      Occupational Therapy Evaluation     Row Name 10/11/18 1536                   OT Evaluation Time/Intention    Subjective Information no complaints;pain  -AF        Document Type evaluation  -AF        Mode of Treatment occupational therapy  -AF        Patient Effort good  -AF        Symptoms Noted During/After Treatment none  -AF           General Information    Patient Profile Reviewed? yes  -AF        Onset of Illness/Injury or Date of Surgery 10/10/18  -AF        Referring Physician David  -DARWIN        Patient Observations alert;agree to therapy  -AF        General Observations of Patient supine in bed  -AF        Prior Level of Function --   currently in SNF s/p back surgery  -AF        Pertinent History of Current Functional Problem PNA, trach, ankylosing spond with back brace  -AF        Existing Precautions/Restrictions fall;other (see comments)   brace when OOB, contact precautions  -AF           Cognitive Assessment/Intervention- PT/OT    Orientation Status (Cognition) oriented to;person   difficulty with trach, used clipboard to write and communica  -AF        Follows Commands (Cognition) follows one step commands;over 90% accuracy;repetition of directions required;verbal cues/prompting required  -AF        Cognitive Assessment/Intervention Comment  will continue to eval  -AF           Bed Mobility Assessment/Treatment    Comment (Bed Mobility) assist x 2 to adjust in bed, pt didn't want HOB up too far secondary to back pain  -AF           Transfer Assessment/Treatment    Comment (Transfers) brace not present  -AF           ADL Assessment/Intervention    BADL Assessment/Intervention --   currently recieveing assistance with all ADL tasks   -AF           General ROM    GENERAL ROM COMMENTS BUE AROM WFL  -AF           MMT (Manual Muscle Testing)    General MMT Comments BUE 3/5  -AF           Positioning and Restraints    Pre-Treatment Position in bed  -AF        Post Treatment Position bed  -AF        In Bed supine;call light within reach;encouraged to call for assist;exit alarm on  -AF           Pain Scale: Numbers Pre/Post-Treatment    Pain Intervention(s) Repositioned  -AF           Pain Scale: Word Pre/Post-Treatment    Pain: Word Scale, Pretreatment 0 - no pain  -AF        Pain: Word Scale, Post-Treatment 0 - no pain  -AF           Wound 09/15/18 0021 Bilateral coccyx pressure injury    Wound - Properties Group Date first assessed: 09/15/18  -PB Time first assessed: 0021  -PB Present On Admission : picture taken  -PB Side: Bilateral  -PB Location: coccyx  -PB Type: pressure injury  -PB Stage, Pressure Injury: Stage 1  -PB Additional Comments: existing pressure injury on admission. New picture taken 10/10/18  -JT       Wound 09/17/18 1657 Other (See comments) back incision    Wound - Properties Group Date first assessed: 09/17/18  -JTA Time first assessed: 1657  -JTA Side: Other (See comments)  -JTA Location: back  -JTA Type: incision  -JTA Additional Comments: exisitg incision. new picture taken 10/10.  -JT       Wound 10/10/18 1752 Right distal;anterior leg    Wound - Properties Group Date first assessed: 10/10/18  -JT Time first assessed: 1752  -JT Present On Admission : yes  -JT Side: Right  -JT Orientation: distal;anterior  -JT Location: leg  -JT  Additional Comments: SCABS  -JT       Wound 10/10/18 1753 Right distal;anterior leg    Wound - Properties Group Date first assessed: 10/10/18  -JT Time first assessed: 1753  -JT Present On Admission : yes  -JT Side: Right  -JT Orientation: distal;anterior  -JT Location: leg  -JT Additional Comments: Scab  -JT       Clinical Impression (OT)    Date of Referral to OT 10/10/18  -AF        OT Diagnosis deficits present with self care skills secdonary to lmited strength, balance and endurance. pt may benefit from skilled OT services prior to d/c back to SNF  -AF        Criteria for Skilled Therapeutic Interventions Met (OT Eval) yes;treatment indicated  -AF        Rehab Potential (OT Eval) fair, will monitor progress closely  -AF        Therapy Frequency (OT Eval) 5 times/wk  -AF        Anticipated Discharge Disposition (OT) skilled nursing facility  -AF           Planned OT Interventions    Planned Therapy Interventions (OT Eval) activity tolerance training;transfer/mobility retraining;strengthening exercise;ROM/therapeutic exercise;BADL retraining  -AF           OT Goals    Bathing Goal Selection (OT) bathing, OT goal 1  -AF        Grooming Goal Selection (OT) grooming, OT goal 1  -AF        Strength Goal Selection (OT) strength, OT goal 1  -AF        Additional Documentation Grooming Goal Selection (OT) (Row);Strength Goal Selection (OT) (Row)  -AF           Bathing Goal 1 (OT)    Activity/Assistive Device (Bathing Goal 1, OT) upper body bathing  -AF        Delaplane Level/Cues Needed (Bathing Goal 1, OT) minimum assist (75% or more patient effort);verbal cues required  -AF        Time Frame (Bathing Goal 1, OT) long term goal (LTG)  -AF        Progress/Outcomes (Bathing Goal 1, OT) goal ongoing  -AF           Grooming Goal 1 (OT)    Activity/Device (Grooming Goal 1, OT) grooming skills, all  -AF        Delaplane (Grooming Goal 1, OT) verbal cues required;set-up required  -AF        Time Frame (Grooming Goal 1,  OT) long term goal (LTG)  -AF        Progress/Outcome (Grooming Goal 1, OT) goal ongoing  -AF           Strength Goal 1 (OT)    Strength Goal 1 (OT) increased strength to 3+/5 BUEs to assist with ADL tasks   -AF        Time Frame (Strength Goal 1, OT) long term goal (LTG)  -AF        Progress/Outcome (Strength Goal 1, OT) goal ongoing  -AF          User Key  (r) = Recorded By, (t) = Taken By, (c) = Cosigned By    Initials Name Effective Dates    PB Emely Mcqueen RN 06/16/16 -     AF Ryann Gupta, OTR 04/03/18 -     Adria Ramos RN 06/16/16 -     Altagracia Downing RN 06/16/16 -            Occupational Therapy Education     Title: PT OT SLP Therapies (Active)     Topic: Occupational Therapy (Active)     Point: ADL training (Done)     Description: Instruct learner(s) on proper safety adaptation and remediation techniques during self care or transfers.   Instruct in proper use of assistive devices.   Learning Progress Summary     Learner Status Readiness Method Response Comment Documented by    Patient Done Acceptance E LUCIA,NR educated on OT, discussed PLOF AF 10/11/18 1541                      User Key     Initials Effective Dates Name Provider Type Discipline    AF 04/03/18 -  Ryann Gupta, OTR Occupational Therapist OT                  OT Recommendation and Plan  Outcome Summary/Treatment Plan (OT)  Anticipated Discharge Disposition (OT): skilled nursing facility  Planned Therapy Interventions (OT Eval): activity tolerance training, transfer/mobility retraining, strengthening exercise, ROM/therapeutic exercise, BADL retraining  Therapy Frequency (OT Eval): 5 times/wk  Plan of Care Review  Plan of Care Reviewed With: patient  Plan of Care Reviewed With: patient  Outcome Summary: Pt presents with PNA from subacute rehab, history of trach, g-tube and recent back surgery. Pt particpated in UB eval. awaiting back brace to progress with OT. pt may beneift from skilled OT services prior to return to  SNF for continued rehab.          Outcome Measures     Row Name 10/11/18 1548             How much help from another is currently needed...    Putting on and taking off regular lower body clothing? 1  -AF      Bathing (including washing, rinsing, and drying) 2  -AF      Toileting (which includes using toilet bed pan or urinal) 1  -AF      Putting on and taking off regular upper body clothing 2  -AF      Taking care of personal grooming (such as brushing teeth) 3  -AF      Eating meals 1  -AF      Score 10  -AF         Functional Assessment    Outcome Measure Options AM-PAC 6 Clicks Daily Activity (OT)  -AF        User Key  (r) = Recorded By, (t) = Taken By, (c) = Cosigned By    Initials Name Provider Type    Ryann Mcclain OTR Occupational Therapist          Time Calculation:         Time Calculation- OT     Row Name 10/11/18 1548             Time Calculation- OT    OT Start Time 0929  -AF      OT Stop Time 0938  -AF      OT Time Calculation (min) 9 min  -AF        User Key  (r) = Recorded By, (t) = Taken By, (c) = Cosigned By    Initials Name Provider Type    Ryann Mcclain OTR Occupational Therapist        Therapy Suggested Charges     Code   Minutes Charges    None           Therapy Charges for Today     Code Description Service Date Service Provider Modifiers Qty    94852366046 HC OT EVAL LOW COMPLEXITY 2 10/11/2018 Ryann Gupta OTR GO 1               AVRIL Mejia  10/11/2018

## 2018-10-11 NOTE — PLAN OF CARE
Problem: Patient Care Overview  Goal: Plan of Care Review   10/11/18 1218   OTHER   Outcome Summary Pt admitted 10/10 from rehab with pneumonia. H/o anklyosing spondylitis, trach, G tube; recent surgery - spouse reports must wear back brace when out of bed. At rehab, pt was working on standing and transferring to chair w/ assist. Today pt able to perform exercises in bed, held further activity due to no brace present - spouse planning to get it today. Anticipate return to rehab.

## 2018-10-11 NOTE — PLAN OF CARE
Problem: Patient Care Overview  Goal: Plan of Care Review  Outcome: Ongoing (interventions implemented as appropriate)   10/11/18 5306   Coping/Psychosocial   Plan of Care Reviewed With patient   OTHER   Outcome Summary Pt presents with PNA from subacute rehab, history of trach, g-tube and recent back surgery. Pt particpated in UB eval. awaiting back brace to progress with OT. pt may beneift from skilled OT services prior to return to SNF for continued rehab.

## 2018-10-11 NOTE — PLAN OF CARE
Problem: Patient Care Overview  Goal: Plan of Care Review   10/11/18 6950   Coping/Psychosocial   Plan of Care Reviewed With patient;spouse   OTHER   Outcome Summary Limited bedside swallow d/t complexity/severity of swallow. Pt with trach (Shiley 6 cuffed and deflated); has a PMSV; but not present and pt does not use it at home. No voicing. ST attempted a single ice chip. No cough observed; however, pt sat rates dropped from 98 to 92. Pt with known dysphagia per VFSS's. Most recent VFSS on 8/20/18 stated pt had a moderate oropharyngeal dysphagia. It was felt pt was safe only with trials of HTL by SLP and recommended possible integration of NMES with swallow therapy. ST spoke to the Rehab SLP at [facility] and she verbalized that pt had not had trials of any po d/t pt was unable to sit up to even 45 degrees and she felt he was not safe for any po. Do not feel pt is safe for po at present. Did speak to pt/wife re: would like to reassess swallow via VFSS with PMSV as this may assist with swallow improvements. ST awaiting order for PMSV. If pt can tolerate, repeat VFSS with PMSV. Would continue NPO with PEG TF and strict oral care 4-5 times daily. Provided handouts re: Communication Board for hospital stay and ALphabet board to assist with communicaiton. Pt is able to write wants/needs.

## 2018-10-11 NOTE — CONSULTS
CHIEF COMPLAINT: Abnormal bladder on CT scan.    HISTORY OF PRESENT ILLNESS: This very pleasant, nonverbal, white male was admitted to the hospital with a pneumonia. He originally presented to the emergency room complaining of diffuse abdominal pain. The patient is nonverbal. There is no family available at this time. He has been having diffuse abdominal pain for the last 5 days. It appears to be of mild-to-moderate severity and constant. Patient presented to the emergency room. He has an indwelling Chandler that was placed earlier this year for chronic and severe urinary incontinence. This Chandler catheter was changed in the emergency room without difficulty. It is currently draining clear yellow urine. Patient has a history of severe urinary incontinence and has undergone Botox injection in the bladder wall. In 09/2018, he was treated with Zyvox for an MRSA urinary tract infection. The patient does not have an elevated white count. He is not febrile. The patient usually sees Willy Cheek MD.     PAST MEDICAL HISTORY:   1.  Ankylosing spondylitis.  2.  Neurogenic bladder.  3.  Coronary artery disease.  4.  Diabetes.  5.  Aphagia.  6.  Hypothyroidism.   7.  Atrial fibrillation.   8.  Seizure disorder.  9.  Indwelling tracheostomy.    PAST SURGICAL HISTORY:   1.  Previous cardiac bypass surgery.  2.  G tube insertion.  3.  Tracheostomy.    SOCIAL HISTORY: The patient never smoked. He does not drink ethanol. He does not drink anything by mouth. He is .    PHYSICAL EXAMINATION:  GENERAL: He is a well-nourished, well-developed, pleasant white male, nonverbal, lying in bed, appears comfortable.  COR: Regular rate and rhythm.  LUNGS: Clear.  ABDOMEN AND FLANKS: Benign. He states he does have some abdominal pain, worse in the left mid abdomen, but there is no discomfort to palpation. He has no suprapubic tenderness.   EXTERNAL GENITALIA: Normal with exception of some mild penile edema. He has an indwelling  Chandler catheter draining clear yellow urine.    DIAGNOSTIC DATA: CT scan recently performed reveals the patient to have some mild haziness around the bladder. He also is noted to have a very thick-walled bladder with a Chandler catheter in place.    IMPRESSION: This patient has no acute genitourinary abnormalities. The thickening in the bladder wall is due to haziness of either his indwelling Chandler and his chronic neurogenic bladder. He, undoubtedly, has colonization of his bladder wall from his indwelling Chandler.     PLAN: I will follow up the patient on an as-needed basis. Please call if we can help any further. Thank you very much for asking us to help with his care.

## 2018-10-11 NOTE — H&P
PCP: Suman Jaimes III, MD    Chief complaint   Chief Complaint   Patient presents with   • Abdominal Pain     pt c/o lower abd pain x5 days. denies n/v/d       HPI  Patient is a 88 y.o. male presents with history of A. fib, diabetes, chronic trach, Chandler, G-tube who presents to the ER due to abdominal pain all over.  Patient underwent back surgery by Dr. Tyler a couple weeks ago and has been at [facility] for [facility] for the last 7 days.  He started having abdominal pain all over his abdomen for the last 5 days, mostly constant, moderate, and the Chandler was changed in the ER.  Wife at bedside says patient often forgets and doesn't remember certain details.    Patient denies any choking however again due to poor memory - not sure if this is reliable.  Occasional cough, denies any fevers or chills, does state that he gets some secretions caught in the back of his throat.  Patient is on tube feeds 250 cc 4 times a day of Jevity 1.5 as well as the same amount of free water.    Patient has had a chronic Chandler for the last 3 years.  They've tried Botox about a month ago but it did not help.    PAST MEDICAL HISTORY  Past Medical History:   Diagnosis Date   • Ankylosing spondylitis lumbar region (CMS/HCC)    • BPH (benign prostatic hyperplasia)    • C. difficile diarrhea    • Constipation    • Coronary artery disease    • Diabetes mellitus (CMS/HCC)    • Dysphagia    • Hyperlipidemia    • Hypertension    • Hypothyroidism    • MRSA infection    • Neuromuscular dysfunction of bladder    • PAF (paroxysmal atrial fibrillation) (CMS/HCC)    • Pain    • Pressure ulcer of sacral region    • Seizures (CMS/HCC)    • Tracheostomy status (CMS/HCC)        PAST SURGICAL HISTORY  Past Surgical History:   Procedure Laterality Date   • CARDIAC SURGERY     • CORONARY ARTERY BYPASS GRAFT      x 5   • GTUBE INSERTION     • LUMBAR DISCECTOMY FUSION INSTRUMENTATION N/A 9/17/2018    Procedure: T12-L2 POSTERIOR ARTHODESIS FUSION  WITH MEDTRONIC NAVIGATION;  Surgeon: Jose Daniel Tyler MD;  Location: [facility] OR;  Service: Neurosurgery   • TRACHEOSTOMY         FAMILY HISTORY  History reviewed. No pertinent family history.    SOCIAL HISTORY  Social History   Substance Use Topics   • Smoking status: Former Smoker   • Smokeless tobacco: Never Used   • Alcohol use No       MEDICATIONS:  Prescriptions Prior to Admission   Medication Sig Dispense Refill Last Dose   • acetaminophen (TYLENOL) 160 MG/5ML solution 20.3 mL by Per G Tube route Every 4 (Four) Hours As Needed for Mild Pain .      • albuterol (PROVENTIL) (5 MG/ML) 0.5% nebulizer solution Take 2.5 mg by nebulization Every 4 (Four) Hours As Needed for Wheezing or Shortness of Air.   Taking   • albuterol (PROVENTIL) (5 MG/ML) 0.5% nebulizer solution Take 5 mg by nebulization 2 (Two) Times a Day.   Taking   • apixaban (ELIQUIS) 5 MG tablet tablet 5 mg by Per G Tube route 2 (Two) Times a Day.   Taking   • atorvastatin (LIPITOR) 40 MG tablet 40 mg by Per G Tube route Daily.   Taking   • budesonide (PULMICORT) 0.5 MG/2ML nebulizer solution Take 0.5 mg by nebulization 2 (Two) Times a Day.   Taking   • finasteride (PROSCAR) 5 MG tablet 5 mg by Per G Tube route Daily.   Taking   • lacosamide (VIMPAT) 50 MG tablet tablet 100 mg by Per G Tube route Every 12 (Twelve) Hours.   Taking   • levETIRAcetam (KEPPRA) 750 MG tablet 1,500 mg by Per G Tube route 2 (Two) Times a Day.   Taking   • levothyroxine (SYNTHROID, LEVOTHROID) 75 MCG tablet 75 mcg by Per G Tube route Daily.   Taking   • midodrine (PROAMATINE) 10 MG tablet 10 mg by Per G Tube route 3 (Three) Times a Day.      • sitaGLIPtin-metFORMIN (JANUMET)  MG per tablet 1 tablet by Per G Tube route 2 (Two) Times a Day With Meals.   Taking   • tiotropium (SPIRIVA) 18 MCG per inhalation capsule Place 1 capsule into inhaler and inhale Daily.   Taking   • docusate sodium 100 MG capsule Take 100 mg by mouth 2 (Two) Times a Day As Needed for  "Constipation.          Allergies:  Patient has no known allergies.    Review of Systems:  Unable to obtain due to memory problems      Vital Signs  Temp:  [97.3 °F (36.3 °C)-98.3 °F (36.8 °C)] 98.3 °F (36.8 °C)  Heart Rate:  [] 113  Resp:  [18-22] 20  BP: (138-162)/(75-85) 141/81  Flowsheet Rows      First Filed Value   Admission Height  182.9 cm (72\") Documented at 10/10/2018 1312   Admission Weight  66.6 kg (146 lb 12.8 oz) Documented at 10/10/2018 1714           Physical Exam:  General Appearance:    Alert, cooperative, in no acute distress, chronically ill appearing    Head:    Normocephalic, without obvious abnormality, atraumatic   Eyes:         conjunctivae and sclerae normal, no icterus, PERRLA   ENT:    Ears grossly intact, oral mucosa dry, poor oral hygiene   Neck:   No adenopathy, supple, trachea midline, trach    Back:     Normal to inspection, range of motion normal   Lungs:     Clear to auscultation,respirations regular, even and                   unlabored    Heart:    Regular rhythm and normal rate,  no murmur, normal S1 and S2,   Abdomen:     Normal bowel sounds, no masses,  soft non-tender, non-distended, + Gtube in place, clarke   Extremities:   Moves all extremities well, no cyanosis, no edema,             Pulses:   Pulses palpable and equal bilaterally   Skin:   No bleeding, rash,  Occasional  bruising ; few abrasion on right shin, healed surgical scar on medial aspect of entire left leg   Neurologic:    Psych:   Cranial nerves 2 - 12 grossly intact, sensation intact,     Moves all extremities well, equal bilateral strength 4/5    Alert and Oriented x 2, Normal Affect   LABS:  Admission on 10/10/2018   Component Date Value Ref Range Status   • Glucose 10/10/2018 88  65 - 99 mg/dL Final   • BUN 10/10/2018 27* 8 - 23 mg/dL Final   • Creatinine 10/10/2018 0.85  0.76 - 1.27 mg/dL Final   • Sodium 10/10/2018 137  136 - 145 mmol/L Final   • Potassium 10/10/2018 4.4  3.5 - 5.2 mmol/L Final   • " Chloride 10/10/2018 94* 98 - 107 mmol/L Final   • CO2 10/10/2018 34.8* 22.0 - 29.0 mmol/L Final   • Calcium 10/10/2018 9.9  8.6 - 10.5 mg/dL Final   • Total Protein 10/10/2018 7.8  6.0 - 8.5 g/dL Final   • Albumin 10/10/2018 3.40* 3.50 - 5.20 g/dL Final   • ALT (SGPT) 10/10/2018 14  1 - 41 U/L Final   • AST (SGOT) 10/10/2018 19  1 - 40 U/L Final   • Alkaline Phosphatase 10/10/2018 161* 39 - 117 U/L Final   • Total Bilirubin 10/10/2018 0.2  0.1 - 1.2 mg/dL Final   • eGFR Non African Amer 10/10/2018 85  >60 mL/min/1.73 Final   • Globulin 10/10/2018 4.4  gm/dL Final   • A/G Ratio 10/10/2018 0.8  g/dL Final   • BUN/Creatinine Ratio 10/10/2018 31.8* 7.0 - 25.0 Final   • Anion Gap 10/10/2018 8.2  mmol/L Final   • Lipase 10/10/2018 60  13 - 60 U/L Final   • Color, UA 10/10/2018 Yellow  Yellow, Straw Final   • Appearance, UA 10/10/2018 Clear  Clear Final   • pH, UA 10/10/2018 8.5* 5.0 - 8.0 Final   • Specific Gravity, UA 10/10/2018 1.019  1.005 - 1.030 Final   • Glucose, UA 10/10/2018 Negative  Negative Final   • Ketones, UA 10/10/2018 Negative  Negative Final   • Bilirubin, UA 10/10/2018 Negative  Negative Final   • Blood, UA 10/10/2018 Trace* Negative Final   • Protein, UA 10/10/2018 100 mg/dL (2+)* Negative Final   • Leuk Esterase, UA 10/10/2018 Moderate (2+)* Negative Final   • Nitrite, UA 10/10/2018 Negative  Negative Final   • Urobilinogen, UA 10/10/2018 0.2 E.U./dL  0.2 - 1.0 E.U./dL Final   • Magnesium 10/10/2018 2.4  1.6 - 2.4 mg/dL Final   • WBC 10/10/2018 6.71  4.50 - 10.70 10*3/mm3 Final   • RBC 10/10/2018 3.27* 4.60 - 6.00 10*6/mm3 Final   • Hemoglobin 10/10/2018 11.0* 13.7 - 17.6 g/dL Final   • Hematocrit 10/10/2018 34.4* 40.4 - 52.2 % Final   • MCV 10/10/2018 105.2* 79.8 - 96.2 fL Final   • MCH 10/10/2018 33.6* 27.0 - 32.7 pg Final   • MCHC 10/10/2018 32.0* 32.6 - 36.4 g/dL Final   • RDW 10/10/2018 14.0  11.5 - 14.5 % Final   • RDW-SD 10/10/2018 53.2  37.0 - 54.0 fl Final   • MPV 10/10/2018 9.8  6.0 - 12.0 fL  Final   • Platelets 10/10/2018 402  140 - 500 10*3/mm3 Final   • Neutrophil % 10/10/2018 70.2  42.7 - 76.0 % Final   • Lymphocyte % 10/10/2018 20.9  19.6 - 45.3 % Final   • Monocyte % 10/10/2018 6.9  5.0 - 12.0 % Final   • Eosinophil % 10/10/2018 1.9  0.3 - 6.2 % Final   • Basophil % 10/10/2018 0.1  0.0 - 1.5 % Final   • Immature Grans % 10/10/2018 0.3  0.0 - 0.5 % Final   • Neutrophils, Absolute 10/10/2018 4.71  1.90 - 8.10 10*3/mm3 Final   • Lymphocytes, Absolute 10/10/2018 1.40  0.90 - 4.80 10*3/mm3 Final   • Monocytes, Absolute 10/10/2018 0.46  0.20 - 1.20 10*3/mm3 Final   • Eosinophils, Absolute 10/10/2018 0.13  0.00 - 0.70 10*3/mm3 Final   • Basophils, Absolute 10/10/2018 0.01  0.00 - 0.20 10*3/mm3 Final   • Immature Grans, Absolute 10/10/2018 0.02  0.00 - 0.03 10*3/mm3 Final   • RBC, UA 10/10/2018 0-2  None Seen, 0-2 /HPF Final   • WBC, UA 10/10/2018 21-30* None Seen, 0-2 /HPF Final   • Bacteria, UA 10/10/2018 4+* None Seen /HPF Final   • Squamous Epithelial Cells, UA 10/10/2018 0-2  None Seen, 0-2 /HPF Final   • Hyaline Casts, UA 10/10/2018 3-6  None Seen /LPF Final   • Methodology 10/10/2018 Automated Microscopy   Final   • Procalcitonin 10/10/2018 0.15  0.10 - 0.25 ng/mL Final   • Lactate 10/10/2018 1.7  0.5 - 2.0 mmol/L Final   • Glucose 10/10/2018 103  70 - 130 mg/dL Final   • Glucose 10/10/2018 111  70 - 130 mg/dL Final       DIAGNOSTICS:  Ct Abdomen Pelvis With Contrast    Result Date: 10/10/2018  1. Acute left lower lobe pneumonia superimposed on chronic changes in the left lower lobe is suspected. 2. The haziness surrounding the collapsed urinary bladder suggests possible cystitis. Please correlate clinically for UTI/cystitis. 3. There is a moderate volume of stool within the colon. There is no evidence for a fecal impaction.  Discussed with Dr. Mccauley.           Results Review:   I reviewed the patient's new clinical results.  Discussed with ER physician  I personally viewed and interpreted the  patient's EKG/Telemetry data- sinus rhythm  Old records reviewed a1c at [facility]    ASSESSMENT AND PLAN      +Healthcare-associated pneumonia  Vs aspiration with h/o copd / Tracheostomy status  -suction/trach care  --Bld cultures,  sputum cultures,   -IV Vanc and zosyn,    -Incentive spirometer  -Recommend Follow-up Chest X-ray 4-6 weeks as an outpatient  -chest PT  -start on duonebs and change Albuterol to when necessary    +abd pain/Cystitis/ Neuromuscular dysfunction of bladder/ Chronic indwelling Clarke catheter  -+/-UTI versus colonization  -clarke  was changed the ER  -Consult urology    +Hypertension/Hypothyroidism/Diabetes type 2, controlled   -Stable, continue medical management    + History of dysphasia /PEG   -Nothing by mouth, patient uses Jevity 1.5 and does 250 cc 4 times a day as well as 250 of free water 4 times a day      +Seizures / PAF / Anticoagulated  -Continue medical management      +DVT proph: on eliquis  +Medical decision maker:wife    I discussed the patients findings and my recommendations with the patient and/or family.  Please reference all orders placed.    Leatha Griffiths MD  10/10/18  8:33 PM

## 2018-10-11 NOTE — THERAPY EVALUATION
Acute Care - Physical Therapy Initial Evaluation  [facility]     Patient Name: Lam Viramontes  : 1930  MRN: [id number]  Today's Date: 10/11/2018   Onset of Illness/Injury or Date of Surgery: 10/10/18            Admit Date: 10/10/2018    Visit Dx:     ICD-10-CM ICD-9-CM   1. Healthcare-associated pneumonia J18.9 486   2. Cystitis N30.90 595.9   3. Generalized abdominal pain R10.84 789.07     Patient Active Problem List   Diagnosis   • Chest pain (non-cardiac)   • Atrial fibrillation (CMS/[facility])   • C. difficile diarrhea   • Coronary artery disease   • Diabetes mellitus (CMS/[facility])   • Disease of thyroid gland   • Hypertension   • Pressure ulcer of sacral region   • Ankylosing spondylitis lumbar region (CMS/[facility])   • Neurogenic bladder due to ankylosing spondylitis   • Tracheostomy in place (CMS/[facility])   • PEG (percutaneous endoscopic gastrostomy) status (CMS/[facility])   • Acute UTI   • Sepsis (CMS/[facility])   • Clostridium difficile colitis   • Anticoagulated   • Closed compression fracture of L1 lumbar vertebra (CMS/[facility])   • Tracheostomy status (CMS/[facility])   • Seizures (CMS/[facility])   • PAF (paroxysmal atrial fibrillation) (CMS/[facility])   • Neuromuscular dysfunction of bladder   • Hypothyroidism   • Gastrostomy tube dependent (CMS/[facility])   • COPD (chronic obstructive pulmonary disease) (CMS/[facility])   • CAD (coronary artery disease)   • AV block, 1st degree   • Bifascicular block   • Macrocytic anemia   • Diabetes type 2, controlled (CMS/[facility])   • History of Clostridium difficile colitis   • Anticoagulated   • Healthcare-associated pneumonia   • Chronic indwelling Chandler catheter   • Cystitis     Past Medical History:   Diagnosis Date   • Ankylosing spondylitis lumbar region (CMS/[facility])    • BPH (benign prostatic hyperplasia)    • C. difficile diarrhea    • Constipation    • Coronary artery disease    • Diabetes mellitus (CMS/[facility])    • Dysphagia    • Hyperlipidemia    • Hypertension    • Hypothyroidism    • MRSA infection    • Neuromuscular  dysfunction of bladder    • PAF (paroxysmal atrial fibrillation) (CMS/HCC)    • Pain    • Pressure ulcer of sacral region    • Seizures (CMS/HCC)    • Tracheostomy status (CMS/HCC)      Past Surgical History:   Procedure Laterality Date   • CARDIAC SURGERY     • CORONARY ARTERY BYPASS GRAFT      x 5   • GTUBE INSERTION     • LUMBAR DISCECTOMY FUSION INSTRUMENTATION N/A 9/17/2018    Procedure: T12-L2 POSTERIOR ARTHODESIS FUSION WITH MEDTRONIC NAVIGATION;  Surgeon: Jose Daniel Tyler MD;  Location: [facility];  Service: Neurosurgery   • TRACHEOSTOMY          PT ASSESSMENT (last 12 hours)      Physical Therapy Evaluation     Row Name 10/11/18 1130          PT Evaluation Time/Intention    Subjective Information no complaints  -AR     Document Type evaluation  -AR     Mode of Treatment physical therapy  -AR     Patient Effort good  -AR     Symptoms Noted During/After Treatment none  -AR     Row Name 10/11/18 1130          General Information    Patient Profile Reviewed? yes  -AR     Onset of Illness/Injury or Date of Surgery 10/10/18  -AR     Prior Level of Function --   transferring to chair w/ assist at [facility]  -AR     Equipment Currently Used at Home none  -AR     Pertinent History of Current Functional Problem ankylosing spond., pna, trach  -AR     Existing Precautions/Restrictions fall;brace worn when out of bed   spouse reports pt must wear brace when out of bed  -AR     Barriers to Rehab none identified  -AR     Row Name 10/11/18 1130          Relationship/Environment    Lives With --   has been at [facility] for ~ 1 wk  -AR     Row Name 10/11/18 1130          Cognitive Assessment/Intervention- PT/OT    Orientation Status (Cognition) oriented to;person;unable/difficult to assess   non-verbal 2/2 trach  -AR     Row Name 10/11/18 1130          Bed Mobility Assessment/Treatment    Comment (Bed Mobility) not tested - spouse getting brace today  -AR     Row Name 10/11/18 1130          General ROM    GENERAL ROM COMMENTS B  LE WFL  -AR     Row Name 10/11/18 1130          MMT (Manual Muscle Testing)    General MMT Comments B LE 3/5   -AR     Row Name 10/11/18 1130          Motor Assessment/Intervention    Additional Documentation Therapeutic Exercise Interventions (Group);Therapeutic Exercise (Group)  -AR     Row Name 10/11/18 1130          Therapeutic Exercise    Comment (Therapeutic Exercise) 5x AP, heel slides  -AR     Row Name 10/11/18 1130          Pain Assessment    Additional Documentation Pain Scale: Word Pre/Post-Treatment (Group)  -AR     Row Name 10/11/18 1130          Pain Scale: Numbers Pre/Post-Treatment    Pain Intervention(s) Repositioned  -AR     Row Name 10/11/18 1130          Pain Scale: Word Pre/Post-Treatment    Pain: Word Scale, Pretreatment 0 - no pain  -AR     Pain: Word Scale, Post-Treatment 0 - no pain  -AR     Row Name             Wound 09/15/18 0021 Bilateral coccyx pressure injury    Wound - Properties Group Date first assessed: 09/15/18  -PB Time first assessed: 0021  -PB Present On Admission : picture taken  -PB Side: Bilateral  -PB Location: coccyx  -PB Type: pressure injury  -PB Stage, Pressure Injury: Stage 1  -PB Additional Comments: existing pressure injury on admission. New picture taken 10/10/18  -JT    Row Name             Wound 09/17/18 1657 Other (See comments) back incision    Wound - Properties Group Date first assessed: 09/17/18  -JTA Time first assessed: 1657  -JTA Side: Other (See comments)  -JTA Location: back  -JTA Type: incision  -JTA Additional Comments: exisitg incision. new picture taken 10/10.  -JT    Row Name             Wound 10/10/18 1752 Right distal;anterior leg    Wound - Properties Group Date first assessed: 10/10/18  -JT Time first assessed: 1752  -JT Present On Admission : yes  -JT Side: Right  -JT Orientation: distal;anterior  -JT Location: leg  -JT Additional Comments: SCABS  -JT    Row Name             Wound 10/10/18 1753 Right distal;anterior leg    Wound - Properties  Group Date first assessed: 10/10/18  -JT Time first assessed: 1753  -JT Present On Admission : yes  -JT Side: Right  -JT Orientation: distal;anterior  -JT Location: leg  -JT Additional Comments: Scab  -JT    Row Name 10/11/18 1130          Physical Therapy Clinical Impression    Patient/Family Goals Statement (PT Clinical Impression) DC back to [facility]  -AR     Criteria for Skilled Interventions Met (PT Clinical Impression) yes  -AR     Pathology/Pathophysiology Noted (Describe Specifically for Each System) musculoskeletal  -AR     Impairments Found (describe specific impairments) aerobic capacity/endurance;gait, locomotion, and balance  -AR     Rehab Potential (PT Clinical Summary) good, to achieve stated therapy goals  -AR     Row Name 10/11/18 1130          Vital Signs    O2 Delivery Pre Treatment trach collar  -AR     Row Name 10/11/18 1130          Physical Therapy Goals    Bed Mobility Goal Selection (PT) bed mobility, PT goal 1  -AR     Transfer Goal Selection (PT) transfer, PT goal 1  -AR     Row Name 10/11/18 1130          Bed Mobility Goal 1 (PT)    Activity/Assistive Device (Bed Mobility Goal 1, PT) sit to supine/supine to sit  -AR     [place] Level/Cues Needed (Bed Mobility Goal 1, PT) minimum assist (75% or more patient effort)  -AR     Time Frame (Bed Mobility Goal 1, PT) 1 week  -AR     Row Name 10/11/18 1130          Transfer Goal 1 (PT)    Activity/Assistive Device (Transfer Goal 1, PT) bed-to-chair/chair-to-bed;sit-to-stand/stand-to-sit  -AR     [place] Level/Cues Needed (Transfer Goal 1, PT) moderate assist (50-74% patient effort)  -AR     Time Frame (Transfer Goal 1, PT) 1 week  -AR     Row Name 10/11/18 1130          Positioning and Restraints    Pre-Treatment Position in bed  -AR     Post Treatment Position bed  -AR     In Bed supine;call light within reach;encouraged to call for assist;with family/caregiver  -AR       User Key  (r) = Recorded By, (t) = Taken By, (c) = Cosigned By     Initials Name Provider Type    PB Emely Mcqueen RN Registered Nurse    Adria Ramos, RN Registered Nurse    Patience Marie, PT Physical Therapist    Altagracia Downing, RN Registered Nurse          Physical Therapy Education     Title: PT OT SLP Therapies (Done)     Topic: Physical Therapy (Done)     Point: Mobility training (Done)    Learning Progress Summary     Learner Status Readiness Method Response Comment Documented by    Patient Done Acceptance E VU  AR 10/11/18 1215    Family Done Acceptance E VU  AR 10/11/18 1215          Point: Home exercise program (Done)    Learning Progress Summary     Learner Status Readiness Method Response Comment Documented by    Patient Done Acceptance E VU  AR 10/11/18 1215    Family Done Acceptance E VU  AR 10/11/18 1215          Point: Body mechanics (Done)    Learning Progress Summary     Learner Status Readiness Method Response Comment Documented by    Patient Done Acceptance E VU  AR 10/11/18 1215    Family Done Acceptance E VU  AR 10/11/18 1215          Point: Precautions (Done)    Learning Progress Summary     Learner Status Readiness Method Response Comment Documented by    Patient Done Acceptance E VU  AR 10/11/18 1215    Family Done Acceptance E VU  AR 10/11/18 1215                      User Key     Initials Effective Dates Name Provider Type Discipline    AR 04/03/18 -  Patience Walker, PT Physical Therapist PT                PT Recommendation and Plan  Anticipated Discharge Disposition (PT): skilled nursing facility  Planned Therapy Interventions (PT Eval): bed mobility training, balance training, gait training, home exercise program, patient/family education, ROM (range of motion), stair training, strengthening  Therapy Frequency (PT Clinical Impression): 5 times/wk  Outcome Summary/Treatment Plan (PT)  Anticipated Discharge Disposition (PT): skilled nursing facility  Outcome Summary: Pt admitted 10/10 from rehab with pneumonia.  H/o anklyosing  spondylitis, trach, G tube; recent surgery - spouse reports must wear back brace when out of bed.  At rehab, pt was working on standing and transferring to chair w/ assist.  Today pt able to perform exercises in bed, held further activity due to no brace present - spouse planning to get it today.  Anticipate return to rehab.          Time Calculation:         PT Charges     Row Name 10/11/18 1225             Time Calculation    Start Time 1130  -AR      Stop Time 1144  -AR      Time Calculation (min) 14 min  -AR      PT Received On 10/11/18  -AR      PT - Next Appointment 10/12/18  -AR      PT Goal Re-Cert Due Date 10/18/18  -AR        User Key  (r) = Recorded By, (t) = Taken By, (c) = Cosigned By    Initials Name Provider Type    AR Patience Walker PT Physical Therapist        Therapy Suggested Charges     Code   Minutes Charges    None           Therapy Charges for Today     Code Description Service Date Service Provider Modifiers Qty    20865776887 HC PT EVAL MOD COMPLEXITY 2 10/11/2018 Patience Walker, PT GP 1    08957602227 HC PT THER PROC EA 15 MIN 10/11/2018 Patience Walker, PT GP 1    37347360970 HC PT THER SUPP EA 15 MIN 10/11/2018 Patience Walker, PT GP 1                 Patience Walker PT  10/11/2018

## 2018-10-11 NOTE — PLAN OF CARE
Problem: Patient Care Overview  Goal: Plan of Care Review   10/11/18 8091   Coping/Psychosocial   Plan of Care Reviewed With patient;spouse   Plan of Care Review   Progress improving   OTHER   Outcome Summary Bolus changed to TID with 300mL H2O flushes. Pt c/o feeling full with 1700 bolus. Trach care completed this shift. ST evaluated patient today. Wound care consult completed today. Atb therapy continues for pneumonia. All medication via peg tube. Air mattress placed today. Patient is wearing 24 hour Holter Monitore. VS and labs monitored.      Goal: Individualization and Mutuality  Outcome: Ongoing (interventions implemented as appropriate)    Goal: Discharge Needs Assessment  Outcome: Ongoing (interventions implemented as appropriate)    Goal: Interprofessional Rounds/Family Conf  Outcome: Ongoing (interventions implemented as appropriate)      Problem: Pneumonia (Adult)  Goal: Signs and Symptoms of Listed Potential Problems Will be Absent, Minimized or Managed (Pneumonia)  Outcome: Ongoing (interventions implemented as appropriate)      Problem: Wound (Includes Pressure Injury) (Adult)  Goal: Signs and Symptoms of Listed Potential Problems Will be Absent, Minimized or Managed (Wound)  Outcome: Ongoing (interventions implemented as appropriate)      Problem: Fall Risk (Adult)  Goal: Identify Related Risk Factors and Signs and Symptoms  Outcome: Ongoing (interventions implemented as appropriate)    Goal: Absence of Fall  Outcome: Ongoing (interventions implemented as appropriate)      Problem: Pain, Acute (Adult)  Goal: Identify Related Risk Factors and Signs and Symptoms  Outcome: Ongoing (interventions implemented as appropriate)    Goal: Acceptable Pain Control/Comfort Level  Outcome: Ongoing (interventions implemented as appropriate)

## 2018-10-11 NOTE — PLAN OF CARE
Problem: Patient Care Overview  Goal: Plan of Care Review  Outcome: Ongoing (interventions implemented as appropriate)   10/11/18 0502   Coping/Psychosocial   Plan of Care Reviewed With patient   Plan of Care Review   Progress no change   OTHER   Outcome Summary VSS, am labs, 2nd deg block, cardio consulted, neuro sx and urology to see, IV antibx, IVF, freq inline suctioning, turn Q2, wound/skin care, blous feeds, nutrition, and WOCN to see, TCDB       Problem: Pneumonia (Adult)  Goal: Signs and Symptoms of Listed Potential Problems Will be Absent, Minimized or Managed (Pneumonia)  Outcome: Ongoing (interventions implemented as appropriate)      Problem: Wound (Includes Pressure Injury) (Adult)  Goal: Signs and Symptoms of Listed Potential Problems Will be Absent, Minimized or Managed (Wound)  Outcome: Ongoing (interventions implemented as appropriate)      Problem: Fall Risk (Adult)  Goal: Identify Related Risk Factors and Signs and Symptoms  Outcome: Ongoing (interventions implemented as appropriate)      Problem: Pain, Acute (Adult)  Goal: Identify Related Risk Factors and Signs and Symptoms  Outcome: Ongoing (interventions implemented as appropriate)

## 2018-10-11 NOTE — DISCHARGE PLACEMENT REQUEST
"Gino Viramontes (88 y.o. Male)     Date of Birth Social Security Number Address Home Phone MRN    09/02/1930  [id number] [facility] [phone] [id number]    [employer] Marital Status          Religion        Admission Date Admission Type Admitting Provider Attending Provider Department, Room/Bed    10/10/18 Emergency Leatha Griffiths MD Lykins, Matthew D, MD [address], [address]/1    Discharge Date Discharge Disposition Discharge Destination                       Attending Provider:  Adiel Burrell MD    Allergies:  No Known Allergies    Isolation:  Contact   Infection:  MRSA (01/21/18)   Code Status:  CPR    Ht:  175.3 cm (69\")   Wt:  66.6 kg (146 lb 12.8 oz)    Admission Cmt:  None   Principal Problem:  None                Active Insurance as of 10/10/2018     Primary Coverage     Payor Plan Insurance Group Employer/Plan Group    [facility] MEDICARE REPLACEMENT [facility] [id number]     Payor Plan Address Payor Plan Phone Number Effective From Effective To    [address] [address]  3/1/2017     [facility] [id number]       Subscriber Name Subscriber Birth Date Member ID       GINO VIRAMONTES 9/2/1930 [id number]                 Emergency Contacts      (Rel.) Home Phone Work Phone Mobile Phone    WanderRola (Spouse) [phone] -- --              "

## 2018-10-11 NOTE — THERAPY EVALUATION
Acute Care - Speech Language Pathology   Swallow Initial Evaluation [facility]     Patient Name: Lam Viramontes  : 1930  MRN: [id number]  Today's Date: 10/11/2018  Onset of Illness/Injury or Date of Surgery: 10/10/18     Referring Physician: David      Admit Date: 10/10/2018    Visit Dx:     ICD-10-CM ICD-9-CM   1. Healthcare-associated pneumonia J18.9 486   2. Cystitis N30.90 595.9   3. Generalized abdominal pain R10.84 789.07     Patient Active Problem List   Diagnosis   • Chest pain (non-cardiac)   • Atrial fibrillation (CMS/[facility])   • C. difficile diarrhea   • Coronary artery disease   • Diabetes mellitus (CMS/[facility])   • Disease of thyroid gland   • Hypertension   • Pressure ulcer of sacral region   • Ankylosing spondylitis lumbar region (CMS/[facility])   • Neurogenic bladder due to ankylosing spondylitis   • Tracheostomy in place (CMS/[facility])   • PEG (percutaneous endoscopic gastrostomy) status (CMS/[facility])   • Acute UTI   • Sepsis (CMS/[facility])   • Clostridium difficile colitis   • Anticoagulated   • Closed compression fracture of L1 lumbar vertebra (CMS/[facility])   • Tracheostomy status (CMS/[facility])   • Seizures (CMS/[facility])   • PAF (paroxysmal atrial fibrillation) (CMS/[facility])   • Neuromuscular dysfunction of bladder   • Hypothyroidism   • Gastrostomy tube dependent (CMS/[facility])   • COPD (chronic obstructive pulmonary disease) (CMS/[facility])   • CAD (coronary artery disease)   • AV block, 1st degree   • Bifascicular block   • Macrocytic anemia   • Diabetes type 2, controlled (CMS/[facility])   • History of Clostridium difficile colitis   • Anticoagulated   • Healthcare-associated pneumonia   • Chronic indwelling Chandler catheter   • Cystitis     Past Medical History:   Diagnosis Date   • Ankylosing spondylitis lumbar region (CMS/[facility])    • BPH (benign prostatic hyperplasia)    • C. difficile diarrhea    • Constipation    • Coronary artery disease    • Diabetes mellitus (CMS/[facility])    • Dysphagia    • Hyperlipidemia    • Hypertension    •  Hypothyroidism    • MRSA infection    • Neuromuscular dysfunction of bladder    • PAF (paroxysmal atrial fibrillation) (CMS/H[facility])    • Pain    • Pressure ulcer of sacral region    • Seizures (CMS/H[facility])    • Tracheostomy status (CMS/H[facility])      Past Surgical History:   Procedure Laterality Date   • CARDIAC SURGERY     • CORONARY ARTERY BYPASS GRAFT      x 5   • GTUBE INSERTION     • LUMBAR DISCECTOMY FUSION INSTRUMENTATION N/A 9/17/2018    Procedure: T12-L2 POSTERIOR ARTHODESIS FUSION WITH MEDTRONIC NAVIGATION;  Surgeon: Jose Daniel Tyler MD;  Location: [facility] OR;  Service: Neurosurgery   • TRACHEOSTOMY            SWALLOW EVALUATION (last 72 hours)      SLP Adult Swallow Evaluation     Row Name 10/11/18 9286                   Rehab Evaluation    Document Type evaluation  -SA        Subjective Information no complaints  -SA        Patient Observations alert;cooperative;agree to therapy  -SA        Patient/Family Observations Pt with trach (cuffed and deflated); has a PMSV; at home does not use per wife; No voicing  -SA        Patient Effort good  -SA        Symptoms Noted During/After Treatment none  -SA           General Information    Patient Profile Reviewed yes  -SA        Pertinent History Of Current Problem Pt with complex medical hx: admited w/ PNA; pt with known dysphagia and NPO; only allowed feeds by ST. 2 VFSS at this facility in past year; most recent being 8/20/18. Pt was in rehab at [place] and receiving ST from 12/13/17-2/13/18. During that time had 2 hospitalizations. Returned home and apparently fell and with recent back sx a couple of weeks ago; s/p lumbar discectomy fusion w/ instrumentation (T12-L2). Currently NPO  -SA        Current Method of Nutrition NPO  -SA        Precautions/Limitations, Vision WFL;for purposes of eval  -SA        Precautions/Limitations, Hearing WFL;for purposes of eval  -SA        Prior Level of Function-Swallowing NPO  -SA        Plans/Goals Discussed with  patient;spouse/S.O.;agreed upon  -        Barriers to Rehab medically complex  -        Patient's Goals for Discharge patient did not state  -SA           General Eating/Swallowing Observations    Respiratory Support Currently in Use tracheostomy;oxygen mask  -        Positioning During Eating semi-reclined  -SA        Utensils Used spoon  -SA        Consistencies Trialed --   ice only  -SA        Pre SpO2 (%) 98  -SA        Post SpO2 (%) 92  -SA           Respiratory    Respiratory Status reduction in SpO2  -SA           Clinical Swallow Eval    Pharyngeal Phase suspected pharyngeal impairment  -SA        Clinical Swallow Evaluation Summary Limited bedside swallow d/t complexity/severity of swallow. Pt with trach (Shiley 6 cuffed and deflated); has a PMSV; but not present and pt does not use it at home. No voicing. ST attempted a single ice chip. No cough observed; however, pt sat rates dropped from 98 to 92. Pt with known dysphagia per VFSS's. Most recent VFSS on 8/20/18 stated pt had a moderate oropharyngeal dysphagia. It was felt pt was safe only with trials of HTL by SLP and recommended possible integration of NMES with swallow therapy. ST spoke to the Rehab SLP at [place] and she verbalized that pt had not had trials of any po d/t pt was unable to sit up to even 45 degrees and she felt he was not safe for any po. Do not feel pt is safe for po at present. Did speak to pt/wife re: would like to reassess swallow with VFSS with PMSV as this may assist with swallow. ST awaiting order for PMSV. If pt can tolerate, repeat VFSS with PMSV. Would continue NPO with PEG TF and strict oral care 4-5 times daily. Provided handouts re: Communication Board for hospital stay and ALphabet board to assist with communicaiton. Pt is able to write wants/needs.    -SA           Clinical Impression    SLP Swallowing Diagnosis severe;suspected pharyngeal dysfunction  -        Functional Impact risk of aspiration/pneumonia   -        Rehab Potential/Prognosis, Swallowing adequate, monitor progress closely  -        Swallow Criteria for Skilled Therapeutic Interventions Met demonstrates skilled criteria  -           Recommendations    Therapy Frequency (Swallow) PRN  -        Predicted Duration Therapy Intervention (Days) until discharge  -        SLP Diet Recommendation NPO;long term alternate methods of nutrition/hydration  -        Recommended Diagnostics reassess via VFSS (MBS);other (see comments)   if MD orders PMSV  -        SLP Rec. for Method of Medication Administration meds via alternate route  -        Monitor for Signs of Aspiration yes;notify SLP if any concerns  -        Anticipated Dischage Disposition long term acute care facility;extended care facility  -           Swallow Goals (SLP)    Oral Nutrition/Hydration Goal Selection (SLP) oral nutrition/hydration, SLP goal 1  -          User Key  (r) = Recorded By, (t) = Taken By, (c) = Cosigned By    Initials Name Effective Dates    Noreen Garza, MS CCC-SLP 04/03/18 -         EDUCATION  The patient has been educated in the following areas:   Communication Impairment Tracheo-Esophageal Prosthesis Speaking Valve Dysphagia (Swallowing Impairment) Oral Care/Hydration NPO rationale.    SLP Recommendation and Plan  SLP Swallowing Diagnosis: severe, suspected pharyngeal dysfunction  SLP Diet Recommendation: NPO, long term alternate methods of nutrition/hydration        Monitor for Signs of Aspiration: yes, notify SLP if any concerns  Recommended Diagnostics: reassess via VFSS (MBS), other (see comments) (if MD orders PMSV)  Swallow Criteria for Skilled Therapeutic Interventions Met: demonstrates skilled criteria  Anticipated Dischage Disposition: long term acute care facility, extended care facility  Rehab Potential/Prognosis, Swallowing: adequate, monitor progress closely  Therapy Frequency (Swallow): PRN  Predicted Duration Therapy Intervention (Days):  until discharge       Plan of Care Reviewed With: patient, spouse  Plan of Care Review  Plan of Care Reviewed With: patient, spouse  Outcome Summary: Limited bedside swallow d/t complexity/severity of swallow. Pt with trach (Shiley 6 cuffed and deflated); has a PMSV; but not present and pt does not use it at home. No voicing. ST attempted a single ice chip. No cough observed; however, pt sat rates dropped from 98 to 92. Pt with known dysphagia per VFSS's. Most recent VFSS on 8/20/18 stated pt had a moderate oropharyngeal dysphagia. It was felt pt was safe only with trials of HTL by SLP and recommended possible integration of NMES with swallow therapy. ST spoke to the Rehab SLP at [place] and she verbalized that pt had not had trials of any po d/t pt was unable to sit up to even 45 degrees and she felt he was not safe for any po. Do not feel pt is safe for po at present. Did speak to pt/wife re: would like to reassess swallow with VFSS with PMSV as this may assist with swallow. ST awaiting order for PMSV. If pt can tolerate, repeat VFSS with PMSV. Would continue NPO with PEG TF and strict oral care 4-5 times daily. Provided handouts re: Communication Board for hospital stay and ALphabet board to assist with communicaiton. Pt is able to write wants/needs.             SLP Outcome Measures (last 72 hours)      SLP Outcome Measures     Row Name 10/11/18 1700             SLP Outcome Measures    Outcome Measure Used? Adult NOMS  -         Adult FCM Scores    FCM Chosen Swallowing  -      Swallowing FCM Score 1  -        User Key  (r) = Recorded By, (t) = Taken By, (c) = Cosigned By    Initials Name Effective Dates    Noreen Garza MS CCC-SLP 04/03/18 -            Time Calculation:         Time Calculation- SLP     Row Name 10/11/18 1715             Time Calculation- SLP    SLP Start Time 0830  -      SLP Stop Time 1000  -      SLP Time Calculation (min) 90 min  -      SLP Received On 10/11/18  -         User Key  (r) = Recorded By, (t) = Taken By, (c) = Cosigned By    Initials Name Provider Type    Noreen Garza MS CCC-SLP Speech and Language Pathologist          Therapy Charges for Today     Code Description Service Date Service Provider Modifiers Qty    [id number]  ST EVAL ORAL PHARYNG SWALLOW 6 10/11/2018 Noreen Pedraza MS CCC-SLP GN 1               MS SIXTO Lim  10/11/2018

## 2018-10-11 NOTE — CONSULTS
[facility]  Cardiology Consult Note    Patient Identification:  Name: Lam Viramontes  Age: 88 y.o.  Sex: male  :  1930  MRN: [id number]             Requesting Physician:  MARK Pugh  Reason for Consultation / Chief Complaint: new 2nd degree AV block    History of Present Illness:   Patient is a 88 y.o. male presents with history of Afib (eliquis), CAD s/p CABG, diastolic dysfunction, h/o transient AV block's, hypertension, COPD, seizures, tracheostomy,  G-tube, hypothyroidism, HLD, and DM who presented to ER due to abdominal pain. A couple of weeks ago he had back surgery and is staying at [facility] for rehabilitation.      Patient is nonverbal, but would nod his head yes or no. When asked if he was in pain he would nod no to chest pain/pressure an denies n/v but would point to his abdomen regarding pain.               Comorbid cardiac risk factors: DM, HTN, HLD    Past Medical History:  Past Medical History:   Diagnosis Date   • Ankylosing spondylitis lumbar region (CMS/HCC)    • BPH (benign prostatic hyperplasia)    • C. difficile diarrhea    • Constipation    • Coronary artery disease    • Diabetes mellitus (CMS/HCC)    • Dysphagia    • Hyperlipidemia    • Hypertension    • Hypothyroidism    • MRSA infection    • Neuromuscular dysfunction of bladder    • PAF (paroxysmal atrial fibrillation) (CMS/HCC)    • Pain    • Pressure ulcer of sacral region    • Seizures (CMS/HCC)    • Tracheostomy status (CMS/[facility])      Past Surgical History:  Past Surgical History:   Procedure Laterality Date   • CARDIAC SURGERY     • CORONARY ARTERY BYPASS GRAFT      x 5   • GTUBE INSERTION     • LUMBAR DISCECTOMY FUSION INSTRUMENTATION N/A 2018    Procedure: T12-L2 POSTERIOR ARTHODESIS FUSION WITH MEDTRONIC NAVIGATION;  Surgeon: Jose Daniel Tyler MD;  Location: [facility] OR;  Service: Neurosurgery   • TRACHEOSTOMY        Allergies:  No Known Allergies  Home Meds:  Prescriptions Prior to Admission    Medication Sig Dispense Refill Last Dose   • acetaminophen (TYLENOL) 160 MG/5ML solution 20.3 mL by Per G Tube route Every 4 (Four) Hours As Needed for Mild Pain .      • albuterol (PROVENTIL) (5 MG/ML) 0.5% nebulizer solution Take 2.5 mg by nebulization Every 4 (Four) Hours As Needed for Wheezing or Shortness of Air.   Taking   • albuterol (PROVENTIL) (5 MG/ML) 0.5% nebulizer solution Take 5 mg by nebulization 2 (Two) Times a Day.   Taking   • apixaban (ELIQUIS) 5 MG tablet tablet 5 mg by Per G Tube route 2 (Two) Times a Day.   Taking   • atorvastatin (LIPITOR) 40 MG tablet 40 mg by Per G Tube route Daily.   Taking   • budesonide (PULMICORT) 0.5 MG/2ML nebulizer solution Take 0.5 mg by nebulization 2 (Two) Times a Day.   Taking   • finasteride (PROSCAR) 5 MG tablet 5 mg by Per G Tube route Daily.   Taking   • lacosamide (VIMPAT) 50 MG tablet tablet 100 mg by Per G Tube route Every 12 (Twelve) Hours.   Taking   • levETIRAcetam (KEPPRA) 750 MG tablet 1,500 mg by Per G Tube route 2 (Two) Times a Day.   Taking   • levothyroxine (SYNTHROID, LEVOTHROID) 75 MCG tablet 75 mcg by Per G Tube route Daily.   Taking   • midodrine (PROAMATINE) 10 MG tablet 10 mg by Per G Tube route 3 (Three) Times a Day.      • sitaGLIPtin-metFORMIN (JANUMET)  MG per tablet 1 tablet by Per G Tube route 2 (Two) Times a Day With Meals.   Taking   • tiotropium (SPIRIVA) 18 MCG per inhalation capsule Place 1 capsule into inhaler and inhale Daily.   Taking   • docusate sodium 100 MG capsule Take 100 mg by mouth 2 (Two) Times a Day As Needed for Constipation.        Current Meds:      Social History:   Social History   Substance Use Topics   • Smoking status: Former Smoker   • Smokeless tobacco: Never Used   • Alcohol use No      Family History:  History reviewed. No pertinent family history.     Review of Systems    Constitutional: Nonverbal weakness,fatigue, fever, rigors, chills   Eyes:   eye pain   ENT/oropharynx: sore throat, epistaxis,  changes in hearing   Cardiovascular: Denies chest pain, chest tightness, palpitations, paroxysmal nocturnal dyspnea, orthopnea, diaphoresis, dizziness / syncopal episode   Respiratory: No shortness of breath,  No wheezing hemoptysis   Gastrointestinal:  abdominal pain, denies nausea, vomiting,     Genitourinary: clarke   Neurological: No headache, tremors, numbness,  one-sided weakness    Musculoskeletal: No cramps, myalgias,  joint pain, joint swelling   Integument: No rash, edema           Constitutional:  Temp:  [97.3 °F (36.3 °C)-98.4 °F (36.9 °C)] 97.9 °F (36.6 °C)  Heart Rate:  [] 66  Resp:  [18-22] 18  BP: (132-162)/(75-87) 132/87    Physical Exam   General:  Appears in no acute distress, resting, nonverbal, nods yes and no for communication.  Eyes: PERTL,  HEENT:  No JVD. Thyroid not visibly enlarged. No mucosal pallor or cyanosis  Respiratory: On 7 liters at 28%, Respirations regular and unlabored at rest. BBS with good air entry in all fields. No crackles, rubs or wheezes auscultated  Cardiovascular: S1S2 Regular rate and rhythm. No murmur, rub or gallop auscultated. No carotid bruits. DP/PT pulses    . No pretibial pitting edema  Gastrointestinal: Abdomen soft, flat, tender. Bowel sounds present. No hepatosplenomegaly.   Musculoskeletal: LUNSFORD x4. No abnormal movements  Extremities: No digital clubbing or cyanosis  Skin:   Skin warm and dry to touch. No rashes  No xanthoma  Neuro: AAO x3 CN II-XII grossly intact              Cardiographics  ECG: 10/11/18            Comparison [id number]        Telemetry:  10/11/18        Echocardiogram:      Stress test: 17    Interpretation Summary     · Equivocal ECG evidence of myocardial ischemia.Negative clinical evidence of myocardial ischemia.  · Left ventricular ejection fraction is normal (Calculated EF = 64%).  · Myocardial perfusion imaging indicates a normal myocardial perfusion study with no evidence of ischemia.  · Impressions are consistent with  a low risk study.            Imaging  Chest X-ray:     Lab Review         Results from last 7 days  Lab Units 10/10/18  1334   MAGNESIUM mg/dL 2.4       Results from last 7 days  Lab Units 10/10/18  1334   SODIUM mmol/L 137   POTASSIUM mmol/L 4.4   BUN mg/dL 27*   CREATININE mg/dL 0.85   CALCIUM mg/dL 9.9     @[id number]@    Results from last 7 days  Lab Units 10/10/18  1334   WBC 10*3/mm3 6.71   HEMOGLOBIN g/dL 11.0*   HEMATOCRIT % 34.4*   PLATELETS 10*3/mm3 402             Assessment:  Healthcare associated Pneumonia  abdomonial pain  HTN  Hx of dysphasia  Seizures  DVT proph  HLD  Hx of Afib  DM  Hypothyrodism  Hx of COPD         Plan    In summary, this is an 87 yo with Healthcare associated Pneumonia, who has 2nd degree AV block and a risk of cardia risk factors. He had a stress test 12/29/17 which showed Left ventricular ejection fraction is normal (Calculated EF = 64%). Myocardial perfusion imaging indicates a normal myocardial perfusion study with no evidence of ischemia. Potassium 4.1. Cr. .80, WBC 7.68, Hgb 9.5, Hct 30. Vitals were /87, HR 49-91, O2 Sat 99 on 7 liters at 28%. We will check CE, echo, and 24 Hour holter monitor with final decision awaiting results.       Jonh Fitch MD  10/11/2018, 8:07 AM      EMR Dragon/Transcription:   Dictated utilizing Dragon dictation

## 2018-10-11 NOTE — PROGRESS NOTES
Name: Lam Viramontes ADMIT: 10/10/2018   : 1930  PCP: Suman Jaimes III, MD    MRN: [id number] LOS: 1 days   AGE/SEX: 88 y.o. male  ROOM: [facility]   Subjective   CC: abdominal discomfort  No acute events.  Abdominal pain has improved.  Dyspnea is chronic and stable.  Not getting much from suctioning per RT.  No f/c/n/v/d.  Tolerating TF.      Objective   Vital Signs  Temp:  [97.9 °F (36.6 °C)-98.4 °F (36.9 °C)] 98.1 °F (36.7 °C)  Heart Rate:  [] 89  Resp:  [16-20] 16  BP: (107-141)/(66-87) 107/75  SpO2:  [96 %-100 %] 99 %  on  Flow (L/min):  [7-8] 8;   Device (Oxygen Therapy): T - piece  Body mass index is 21.68 kg/m².    Physical Exam   Constitutional: No distress.   HENT:   Head: Normocephalic and atraumatic.   Mouth/Throat: Oropharynx is clear and moist.   Tracheostomy in place with t-piece   Eyes: Pupils are equal, round, and reactive to light. Conjunctivae and EOM are normal.   Neck: Normal range of motion. Neck supple. No JVD present.   Cardiovascular: Normal rate, regular rhythm and intact distal pulses.    Pulmonary/Chest: Effort normal. He has decreased breath sounds in the right lower field and the left lower field.   Coarse breath sounds   Abdominal: Soft. Bowel sounds are normal. There is no tenderness.   PEG tube in place   Musculoskeletal: He exhibits no edema or tenderness.   Neurological: He is alert.   Skin: Skin is warm and dry. He is not diaphoretic.   Psychiatric: He has a normal mood and affect. His behavior is normal.   Nursing note and vitals reviewed.      Results Review:       I reviewed the patient's new clinical results.    Results from last 7 days  Lab Units 10/11/18  0759 10/10/18  1334   WBC 10*3/mm3 7.68 6.71   HEMOGLOBIN g/dL 9.5* 11.0*   PLATELETS 10*3/mm3 333 402       Results from last 7 days  Lab Units 10/11/18  0759 10/10/18  1334   SODIUM mmol/L 137 137   POTASSIUM mmol/L 4.1 4.4   CHLORIDE mmol/L 100 94*   CO2 mmol/L 29.3* 34.8*   BUN mg/dL 20 27*   CREATININE  mg/dL 0.80 0.85   GLUCOSE mg/dL 151* 88   Estimated Creatinine Clearance: 60.1 mL/min (by C-G formula based on SCr of 0.8 mg/dL).    Results from last 7 days  Lab Units 10/11/18  0759 10/10/18  1334   CALCIUM mg/dL 8.9 9.9   ALBUMIN g/dL  --  3.40*   MAGNESIUM mg/dL  --  2.4         apixaban 5 mg Per G Tube Q12H   atorvastatin 40 mg Per G Tube Daily   budesonide 0.5 mg Nebulization BID - RT   doxazosin 2 mg Per G Tube Q24H   insulin aspart 0-7 Units Subcutaneous 4x Daily With Meals & Nightly   ipratropium-albuterol 3 mL Nebulization 4x Daily - RT   lacosamide 100 mg Per G Tube Q12H   levETIRAcetam 1,500 mg Per G Tube Q12H   levothyroxine 75 mcg Per G Tube Q AM   linagliptin 5 mg Per G Tube Daily   midodrine 10 mg Per G Tube TID   piperacillin-tazobactam 4.5 g Intravenous Q8H   polyethylene glycol 17 g Per G Tube Daily   sodium chloride 3 mL Intravenous Q12H   vancomycin 750 mg Intravenous Q12H       Pharmacy to dose vancomycin     Pharmacy to Dose Zosyn     sodium chloride 125 mL/hr Last Rate: 125 mL/hr (10/11/18 1623)   NPO Diet      Assessment/Plan      Active Hospital Problems    Diagnosis Date Noted   • Healthcare-associated pneumonia [J18.9] 10/10/2018   • Chronic indwelling Chandler catheter [Z92.89] 10/10/2018   • Cystitis [N30.90] 10/10/2018   • Tracheostomy status (CMS/[facility]) [Z93.0] 09/15/2018   • Seizures (CMS/[facility]) [R56.9] 09/15/2018   • PAF (paroxysmal atrial fibrillation) (CMS/[facility]) [I48.0] 09/15/2018   • Neuromuscular dysfunction of bladder [N31.9] 09/15/2018   • Hypothyroidism [E03.9] 09/15/2018   • Diabetes type 2, controlled (CMS/[facility]) [E11.9] 09/15/2018   • Anticoagulated [Z79.01] 09/15/2018   • PEG (percutaneous endoscopic gastrostomy) status (CMS/[facility]) [Z93.1] 12/29/2017   • Hypertension [I10] 12/29/2017      Resolved Hospital Problems    Diagnosis Date Noted Date Resolved   No resolved problems to display.   Abdominal Discomfort  - likely related to constipation, no other significant abdominal findings  on CT  - start on bowel regimen  - tolerating TF well    Abnormal Chest Imaging  - ?atelectasis vs pneumonia in LLL which is new from last month  - procalcitonin is low, WBC normal, and patient is afebrile  - send respiratory culture and MRSA screen, RVP is negative  - will de-escalate abx soon if above is negative    Neurogenic Bladder  - has chronic clarke catheter  - no urinary complaints, no urine cx indicated  - urology has seen, appreciate recs    Type 2 DM  - a1c 6.50%  - continue linagliptin   - cover with ssi    Second Degree AV block  - 24h holter pending  - cardiology following, appreciate recs    DVT Prophylaxis  - on eliquis for afib      Adiel Burrell MD  [facility]  10/11/18  6:32 PM

## 2018-10-11 NOTE — PROGRESS NOTES
Discharge Planning Assessment  [facility]     Patient Name: Lam Viramontes  MRN: [id number]  Today's Date: 10/11/2018    Admit Date: 10/10/2018          Discharge Needs Assessment     Row Name 10/11/18 5343       Living Environment    Lives With spouse    Unique Family Situation Patient lives with his wife, Rola Viramontes ( [phone]), but has been at [facility] about a week to get rehab    Current Living Arrangements home/apartment/condo    Primary Care Provided by self    Provides Primary Care For no one    Family Caregiver if Needed none    Quality of Family Relationships helpful;involved;supportive       Resource/Environmental Concerns    Resource/Environmental Concerns none    Transportation Concerns car, none       Transition Planning    Patient/Family Anticipates Transition to inpatient rehabilitation facility    Patient/Family Anticipated Services at Transition none    Transportation Anticipated health plan transportation       Discharge Needs Assessment    Readmission Within the Last 30 Days other (see comments)   patient is post- op back surgery- now readmitted with [facility]     Concerns to be Addressed discharge planning    Equipment Currently Used at Home trach supplies;feeding device    Anticipated Changes Related to Illness none    Equipment Needed After Discharge none            Discharge Plan     Row Name 10/11/18 9169       Plan    Plan return to skilled bed at [facility] pending bed availability and [facility] pre-cert    Patient/Family in Agreement with Plan yes    Plan Comments Spoke with patient and wife, Rola Viramontes ( [phone]).  Patient was at [facility] prior to admit and would like to return there at [facility] to complete his rehab.  Spoke with [facility] and patient is from a skilled bed, no bed hold.  They can accept back pending bed availability.  Transfer packet started and in CCP offiice.  CCP will follow. Soha Kovacs RN        Destination     Service Request Status Selected  Specialties Address Phone Number Fax Number    [facility] Pending - [facility] N/A [id number] [facility] [address] [id number] [phone]      Durable Medical Equipment     No service coordination in this encounter.      Dialysis/Infusion     No service coordination in this encounter.      Home Medical Care     No service coordination in this encounter.      Social Care     No service coordination in this encounter.                Demographic Summary     Row Name 10/11/18 1323       General Information    Admission Type inpatient    Arrived From emergency department    Required Notices Provided Important Message from Medicare    Referral Source admission list    Reason for Consult discharge planning    Preferred Language English            Functional Status     Row Name 10/11/18 1323       Functional Status    Usual Activity Tolerance moderate    Current Activity Tolerance poor       Functional Status, IADL    Medications completely dependent    Meal Preparation completely dependent    Housekeeping completely dependent    Laundry completely dependent    Shopping completely dependent       Mental Status    General Appearance WDL WDL       Mental Status Summary    Recent Changes in Mental Status/Cognitive Functioning no changes            Psychosocial    No documentation.           Abuse/Neglect    No documentation.           Legal    No documentation.           Substance Abuse    No documentation.           Patient Forms    No documentation.         Soha Kovacs RN

## 2018-10-11 NOTE — NURSING NOTE
CWOCN consult for buttocks- patient has areas of dryness and scattered redness with some partial thickness broken skin. His wife reports that he often has this at home and their home aid keeps the skin intact with barrier ointment. She states that having been in rehab and now in the hospital, it has stayed more red overall. The area is generalized over the buttocks more than just at the coccyx/bony prominence. There is likely a friction, moisture and pressure component (stage 1, as the broken skin appears to be more at the buttocks than the coccyx) in addition to patient's current health status. Last admission, he had a stage 1 at the coccyx and buttocks noted.  Recommend to continue barrier ointment or to use a silicone border dressing if that will stay on, to reduce friction and manage any moisture. Additionally, I will order a low air loss mattress for patient for skin protection. They are agreeable to plan.   Call placed to [facility] and LEVAR mattress requested.

## 2018-10-12 ENCOUNTER — APPOINTMENT (OUTPATIENT)
Dept: CARDIOLOGY | Facility: HOSPITAL | Age: 83
End: 2018-10-12

## 2018-10-12 LAB
ANION GAP SERPL CALCULATED.3IONS-SCNC: 9 MMOL/L
BASOPHILS # BLD AUTO: 0 10*3/MM3 (ref 0–0.2)
BASOPHILS NFR BLD AUTO: 0 % (ref 0–1.5)
BH CV ECHO MEAS - ACS: 2 CM
BH CV ECHO MEAS - AO MAX PG (FULL): 4.2 MMHG
BH CV ECHO MEAS - AO MAX PG: 8.4 MMHG
BH CV ECHO MEAS - AO MEAN PG (FULL): 3 MMHG
BH CV ECHO MEAS - AO MEAN PG: 5 MMHG
BH CV ECHO MEAS - AO ROOT AREA (BSA CORRECTED): 1.6
BH CV ECHO MEAS - AO ROOT AREA: 6.6 CM^2
BH CV ECHO MEAS - AO ROOT DIAM: 2.9 CM
BH CV ECHO MEAS - AO V2 MAX: 145 CM/SEC
BH CV ECHO MEAS - AO V2 MEAN: 99 CM/SEC
BH CV ECHO MEAS - AO V2 VTI: 28.9 CM
BH CV ECHO MEAS - AVA(I,A): 2.4 CM^2
BH CV ECHO MEAS - AVA(I,D): 2.4 CM^2
BH CV ECHO MEAS - AVA(V,A): 2 CM^2
BH CV ECHO MEAS - AVA(V,D): 2 CM^2
BH CV ECHO MEAS - BSA(HAYCOCK): 1.8 M^2
BH CV ECHO MEAS - BSA: 1.8 M^2
BH CV ECHO MEAS - BZI_BMI: 21.6 KILOGRAMS/M^2
BH CV ECHO MEAS - BZI_METRIC_HEIGHT: 175.3 CM
BH CV ECHO MEAS - BZI_METRIC_WEIGHT: 66.2 KG
BH CV ECHO MEAS - EDV(CUBED): 85.2 ML
BH CV ECHO MEAS - EDV(MOD-SP2): 102 ML
BH CV ECHO MEAS - EDV(MOD-SP4): 97 ML
BH CV ECHO MEAS - EDV(TEICH): 87.7 ML
BH CV ECHO MEAS - EF(CUBED): 74.2 %
BH CV ECHO MEAS - EF(MOD-BP): 65 %
BH CV ECHO MEAS - EF(MOD-SP2): 66.7 %
BH CV ECHO MEAS - EF(MOD-SP4): 64.9 %
BH CV ECHO MEAS - EF(TEICH): 66.3 %
BH CV ECHO MEAS - ESV(CUBED): 22 ML
BH CV ECHO MEAS - ESV(MOD-SP2): 34 ML
BH CV ECHO MEAS - ESV(MOD-SP4): 34 ML
BH CV ECHO MEAS - ESV(TEICH): 29.6 ML
BH CV ECHO MEAS - FS: 36.4 %
BH CV ECHO MEAS - IVS/LVPW: 1
BH CV ECHO MEAS - IVSD: 1.2 CM
BH CV ECHO MEAS - LAT PEAK E' VEL: 15.3 CM/SEC
BH CV ECHO MEAS - LV DIASTOLIC VOL/BSA (35-75): 53.7 ML/M^2
BH CV ECHO MEAS - LV MASS(C)D: 191.3 GRAMS
BH CV ECHO MEAS - LV MASS(C)DI: 105.9 GRAMS/M^2
BH CV ECHO MEAS - LV MAX PG: 4.2 MMHG
BH CV ECHO MEAS - LV MEAN PG: 2 MMHG
BH CV ECHO MEAS - LV SYSTOLIC VOL/BSA (12-30): 18.8 ML/M^2
BH CV ECHO MEAS - LV V1 MAX: 103 CM/SEC
BH CV ECHO MEAS - LV V1 MEAN: 65.2 CM/SEC
BH CV ECHO MEAS - LV V1 VTI: 24 CM
BH CV ECHO MEAS - LVIDD: 4.4 CM
BH CV ECHO MEAS - LVIDS: 2.8 CM
BH CV ECHO MEAS - LVLD AP2: 6.9 CM
BH CV ECHO MEAS - LVLD AP4: 7.9 CM
BH CV ECHO MEAS - LVLS AP2: 5.6 CM
BH CV ECHO MEAS - LVLS AP4: 6.8 CM
BH CV ECHO MEAS - LVOT AREA (M): 2.8 CM^2
BH CV ECHO MEAS - LVOT AREA: 2.8 CM^2
BH CV ECHO MEAS - LVOT DIAM: 1.9 CM
BH CV ECHO MEAS - LVPWD: 1.2 CM
BH CV ECHO MEAS - MED PEAK E' VEL: 13.2 CM/SEC
BH CV ECHO MEAS - MR MAX PG: 100.4 MMHG
BH CV ECHO MEAS - MR MAX VEL: 501 CM/SEC
BH CV ECHO MEAS - MV DEC SLOPE: 761 CM/SEC^2
BH CV ECHO MEAS - MV DEC TIME: 0.15 SEC
BH CV ECHO MEAS - MV E MAX VEL: 108 CM/SEC
BH CV ECHO MEAS - MV MAX PG: 7 MMHG
BH CV ECHO MEAS - MV MEAN PG: 2 MMHG
BH CV ECHO MEAS - MV P1/2T MAX VEL: 129 CM/SEC
BH CV ECHO MEAS - MV P1/2T: 49.6 MSEC
BH CV ECHO MEAS - MV V2 MAX: 132 CM/SEC
BH CV ECHO MEAS - MV V2 MEAN: 65 CM/SEC
BH CV ECHO MEAS - MV V2 VTI: 24.5 CM
BH CV ECHO MEAS - MVA P1/2T LCG: 1.7 CM^2
BH CV ECHO MEAS - MVA(P1/2T): 4.4 CM^2
BH CV ECHO MEAS - MVA(VTI): 2.8 CM^2
BH CV ECHO MEAS - PA ACC TIME: 0.1 SEC
BH CV ECHO MEAS - PA MAX PG (FULL): 3.3 MMHG
BH CV ECHO MEAS - PA MAX PG: 6.2 MMHG
BH CV ECHO MEAS - PA PR(ACCEL): 33.1 MMHG
BH CV ECHO MEAS - PA V2 MAX: 124 CM/SEC
BH CV ECHO MEAS - PVA(V,A): 2.4 CM^2
BH CV ECHO MEAS - PVA(V,D): 2.4 CM^2
BH CV ECHO MEAS - QP/QS: 0.73
BH CV ECHO MEAS - RAP SYSTOLE: 15 MMHG
BH CV ECHO MEAS - RV MAX PG: 2.8 MMHG
BH CV ECHO MEAS - RV MEAN PG: 1 MMHG
BH CV ECHO MEAS - RV V1 MAX: 84.2 CM/SEC
BH CV ECHO MEAS - RV V1 MEAN: 46.9 CM/SEC
BH CV ECHO MEAS - RV V1 VTI: 14.3 CM
BH CV ECHO MEAS - RVOT AREA: 3.5 CM^2
BH CV ECHO MEAS - RVOT DIAM: 2.1 CM
BH CV ECHO MEAS - RVSP: 49 MMHG
BH CV ECHO MEAS - SI(AO): 105.6 ML/M^2
BH CV ECHO MEAS - SI(CUBED): 35 ML/M^2
BH CV ECHO MEAS - SI(LVOT): 37.7 ML/M^2
BH CV ECHO MEAS - SI(MOD-SP2): 37.6 ML/M^2
BH CV ECHO MEAS - SI(MOD-SP4): 34.9 ML/M^2
BH CV ECHO MEAS - SI(TEICH): 32.2 ML/M^2
BH CV ECHO MEAS - SV(AO): 190.9 ML
BH CV ECHO MEAS - SV(CUBED): 63.2 ML
BH CV ECHO MEAS - SV(LVOT): 68 ML
BH CV ECHO MEAS - SV(MOD-SP2): 68 ML
BH CV ECHO MEAS - SV(MOD-SP4): 63 ML
BH CV ECHO MEAS - SV(RVOT): 49.5 ML
BH CV ECHO MEAS - SV(TEICH): 58.1 ML
BH CV ECHO MEAS - TAPSE (>1.6): 1.6 CM2
BH CV ECHO MEAS - TR MAX VEL: 290 CM/SEC
BH CV ECHO MEASUREMENTS AVERAGE E/E' RATIO: 7.58
BH CV VAS BP RIGHT ARM: NORMAL MMHG
BH CV XLRA - RV BASE: 3.68 CM
BH CV XLRA - TDI S': 10.6 CM/SEC
BUN BLD-MCNC: 19 MG/DL (ref 8–23)
BUN/CREAT SERPL: 24.7 (ref 7–25)
CALCIUM SPEC-SCNC: 8.8 MG/DL (ref 8.6–10.5)
CHLORIDE SERPL-SCNC: 104 MMOL/L (ref 98–107)
CO2 SERPL-SCNC: 27 MMOL/L (ref 22–29)
CREAT BLD-MCNC: 0.77 MG/DL (ref 0.76–1.27)
DEPRECATED RDW RBC AUTO: 55.3 FL (ref 37–54)
EOSINOPHIL # BLD AUTO: 0.28 10*3/MM3 (ref 0–0.7)
EOSINOPHIL NFR BLD AUTO: 3.7 % (ref 0.3–6.2)
ERYTHROCYTE [DISTWIDTH] IN BLOOD BY AUTOMATED COUNT: 14.3 % (ref 11.5–14.5)
GFR SERPL CREATININE-BSD FRML MDRD: 95 ML/MIN/1.73
GLUCOSE BLD-MCNC: 86 MG/DL (ref 65–99)
GLUCOSE BLDC GLUCOMTR-MCNC: 212 MG/DL (ref 70–130)
GLUCOSE BLDC GLUCOMTR-MCNC: 229 MG/DL (ref 70–130)
GLUCOSE BLDC GLUCOMTR-MCNC: 238 MG/DL (ref 70–130)
HCT VFR BLD AUTO: 32.1 % (ref 40.4–52.2)
HGB BLD-MCNC: 9.5 G/DL (ref 13.7–17.6)
IMM GRANULOCYTES # BLD: 0.03 10*3/MM3 (ref 0–0.03)
IMM GRANULOCYTES NFR BLD: 0.4 % (ref 0–0.5)
LEFT ATRIUM VOLUME INDEX: 27 ML/M2
LV EF 2D ECHO EST: 65 %
LYMPHOCYTES # BLD AUTO: 1.1 10*3/MM3 (ref 0.9–4.8)
LYMPHOCYTES NFR BLD AUTO: 14.7 % (ref 19.6–45.3)
MAXIMAL PREDICTED HEART RATE: 132 BPM
MCH RBC QN AUTO: 32 PG (ref 27–32.7)
MCHC RBC AUTO-ENTMCNC: 29.6 G/DL (ref 32.6–36.4)
MCV RBC AUTO: 108.1 FL (ref 79.8–96.2)
MONOCYTES # BLD AUTO: 0.7 10*3/MM3 (ref 0.2–1.2)
MONOCYTES NFR BLD AUTO: 9.3 % (ref 5–12)
NEUTROPHILS # BLD AUTO: 5.39 10*3/MM3 (ref 1.9–8.1)
NEUTROPHILS NFR BLD AUTO: 71.9 % (ref 42.7–76)
PLATELET # BLD AUTO: 313 10*3/MM3 (ref 140–500)
PMV BLD AUTO: 10.1 FL (ref 6–12)
POTASSIUM BLD-SCNC: 4.2 MMOL/L (ref 3.5–5.2)
RBC # BLD AUTO: 2.97 10*6/MM3 (ref 4.6–6)
SODIUM BLD-SCNC: 140 MMOL/L (ref 136–145)
STRESS TARGET HR: 112 BPM
WBC NRBC COR # BLD: 7.5 10*3/MM3 (ref 4.5–10.7)

## 2018-10-12 PROCEDURE — 93005 ELECTROCARDIOGRAM TRACING: CPT | Performed by: NURSE PRACTITIONER

## 2018-10-12 PROCEDURE — 94799 UNLISTED PULMONARY SVC/PX: CPT

## 2018-10-12 PROCEDURE — 93306 TTE W/DOPPLER COMPLETE: CPT

## 2018-10-12 PROCEDURE — 99232 SBSQ HOSP IP/OBS MODERATE 35: CPT | Performed by: NURSE PRACTITIONER

## 2018-10-12 PROCEDURE — 25010000002 VANCOMYCIN 750 MG RECONSTITUTED SOLUTION: Performed by: INTERNAL MEDICINE

## 2018-10-12 PROCEDURE — 93306 TTE W/DOPPLER COMPLETE: CPT | Performed by: INTERNAL MEDICINE

## 2018-10-12 PROCEDURE — 97110 THERAPEUTIC EXERCISES: CPT | Performed by: PHYSICAL THERAPIST

## 2018-10-12 PROCEDURE — 25010000002 PIPERACILLIN SOD-TAZOBACTAM PER 1 G: Performed by: INTERNAL MEDICINE

## 2018-10-12 PROCEDURE — 63710000001 INSULIN ASPART PER 5 UNITS: Performed by: INTERNAL MEDICINE

## 2018-10-12 PROCEDURE — 97530 THERAPEUTIC ACTIVITIES: CPT

## 2018-10-12 PROCEDURE — 82962 GLUCOSE BLOOD TEST: CPT

## 2018-10-12 PROCEDURE — 80048 BASIC METABOLIC PNL TOTAL CA: CPT | Performed by: INTERNAL MEDICINE

## 2018-10-12 PROCEDURE — 85025 COMPLETE CBC W/AUTO DIFF WBC: CPT | Performed by: INTERNAL MEDICINE

## 2018-10-12 PROCEDURE — 93010 ELECTROCARDIOGRAM REPORT: CPT | Performed by: INTERNAL MEDICINE

## 2018-10-12 RX ORDER — DIAZEPAM 2 MG/1
2 TABLET ORAL EVERY 6 HOURS PRN
Status: DISCONTINUED | OUTPATIENT
Start: 2018-10-12 | End: 2018-10-14 | Stop reason: HOSPADM

## 2018-10-12 RX ORDER — NYSTATIN 100000 [USP'U]/G
POWDER TOPICAL EVERY 12 HOURS SCHEDULED
Status: DISCONTINUED | OUTPATIENT
Start: 2018-10-12 | End: 2018-10-14 | Stop reason: HOSPADM

## 2018-10-12 RX ADMIN — TAZOBACTAM SODIUM AND PIPERACILLIN SODIUM 4.5 G: 500; 4 INJECTION, SOLUTION INTRAVENOUS at 16:05

## 2018-10-12 RX ADMIN — NYSTATIN: 100000 POWDER TOPICAL at 21:37

## 2018-10-12 RX ADMIN — APIXABAN 5 MG: 5 TABLET, FILM COATED ORAL at 09:32

## 2018-10-12 RX ADMIN — TAZOBACTAM SODIUM AND PIPERACILLIN SODIUM 4.5 G: 500; 4 INJECTION, SOLUTION INTRAVENOUS at 00:18

## 2018-10-12 RX ADMIN — LACOSAMIDE 100 MG: 100 TABLET, FILM COATED ORAL at 21:30

## 2018-10-12 RX ADMIN — LACOSAMIDE 100 MG: 100 TABLET, FILM COATED ORAL at 09:47

## 2018-10-12 RX ADMIN — INSULIN ASPART 3 UNITS: 100 INJECTION, SOLUTION INTRAVENOUS; SUBCUTANEOUS at 13:07

## 2018-10-12 RX ADMIN — IPRATROPIUM BROMIDE AND ALBUTEROL SULFATE 3 ML: 2.5; .5 SOLUTION RESPIRATORY (INHALATION) at 19:31

## 2018-10-12 RX ADMIN — LINAGLIPTIN 5 MG: 5 TABLET, FILM COATED ORAL at 09:32

## 2018-10-12 RX ADMIN — LEVETIRACETAM 1500 MG: 100 SOLUTION ORAL at 21:37

## 2018-10-12 RX ADMIN — APIXABAN 5 MG: 5 TABLET, FILM COATED ORAL at 21:30

## 2018-10-12 RX ADMIN — MIDODRINE HYDROCHLORIDE 10 MG: 5 TABLET ORAL at 16:16

## 2018-10-12 RX ADMIN — ATORVASTATIN CALCIUM 40 MG: 20 TABLET, FILM COATED ORAL at 09:32

## 2018-10-12 RX ADMIN — MIDODRINE HYDROCHLORIDE 10 MG: 5 TABLET ORAL at 09:33

## 2018-10-12 RX ADMIN — SODIUM CHLORIDE 125 ML/HR: 9 INJECTION, SOLUTION INTRAVENOUS at 01:57

## 2018-10-12 RX ADMIN — INSULIN ASPART 3 UNITS: 100 INJECTION, SOLUTION INTRAVENOUS; SUBCUTANEOUS at 17:43

## 2018-10-12 RX ADMIN — IPRATROPIUM BROMIDE AND ALBUTEROL SULFATE 3 ML: 2.5; .5 SOLUTION RESPIRATORY (INHALATION) at 15:54

## 2018-10-12 RX ADMIN — Medication 3 ML: at 09:33

## 2018-10-12 RX ADMIN — DIAZEPAM 2 MG: 2 TABLET ORAL at 21:30

## 2018-10-12 RX ADMIN — POLYETHYLENE GLYCOL 3350 17 G: 17 POWDER, FOR SOLUTION ORAL at 09:47

## 2018-10-12 RX ADMIN — BUDESONIDE 0.5 MG: 0.5 INHALANT RESPIRATORY (INHALATION) at 07:12

## 2018-10-12 RX ADMIN — BUDESONIDE 0.5 MG: 0.5 INHALANT RESPIRATORY (INHALATION) at 19:31

## 2018-10-12 RX ADMIN — MIDODRINE HYDROCHLORIDE 10 MG: 5 TABLET ORAL at 21:30

## 2018-10-12 RX ADMIN — SODIUM CHLORIDE 750 MG: 900 INJECTION, SOLUTION INTRAVENOUS at 06:31

## 2018-10-12 RX ADMIN — Medication 3 ML: at 21:38

## 2018-10-12 RX ADMIN — IPRATROPIUM BROMIDE AND ALBUTEROL SULFATE 3 ML: 2.5; .5 SOLUTION RESPIRATORY (INHALATION) at 07:11

## 2018-10-12 RX ADMIN — LEVETIRACETAM 1500 MG: 100 SOLUTION ORAL at 09:32

## 2018-10-12 RX ADMIN — TAZOBACTAM SODIUM AND PIPERACILLIN SODIUM 4.5 G: 500; 4 INJECTION, SOLUTION INTRAVENOUS at 08:12

## 2018-10-12 RX ADMIN — ALBUTEROL SULFATE 2.5 MG: 2.5 SOLUTION RESPIRATORY (INHALATION) at 22:42

## 2018-10-12 RX ADMIN — DOXAZOSIN 2 MG: 4 TABLET ORAL at 09:32

## 2018-10-12 RX ADMIN — INSULIN ASPART 3 UNITS: 100 INJECTION, SOLUTION INTRAVENOUS; SUBCUTANEOUS at 21:37

## 2018-10-12 RX ADMIN — IPRATROPIUM BROMIDE AND ALBUTEROL SULFATE 3 ML: 2.5; .5 SOLUTION RESPIRATORY (INHALATION) at 12:25

## 2018-10-12 NOTE — NURSING NOTE
CWOCN follow up- called by RN to check patient related to rash to buttocks that was not there yesterday. We have used zguard to buttocks prior and a silicone border dressing. Today there is patchy redness at edge of silicone border dressing/upper buttocks and left groin. Wife states that patient does have some skin sensitivities and was worried that some of the rash might be related to the zguard. It presents to me more as fungal rash than as a skin irritation but will change to hydraguard or recommended that wife bring in the ointment they use at home. She verbalized understanding. Recommend to apply light dusting of nystatin to groins and to upper buttocks at the patchy rash. Discussed with RN.   While present, patient did have a very large loose brown BM. We were able to clean him help and apply skin barrier topically. Removed the silicone border dressing. If patient continues with loose BM, would d/c use of dressing as stool will be trapped under dressing and use barrier ointment.

## 2018-10-12 NOTE — THERAPY TREATMENT NOTE
Acute Care - Occupational Therapy Treatment Note  [facility]     Patient Name: Lam Viramontes  : 1930  MRN: [id number]  Today's Date: 10/12/2018  Onset of Illness/Injury or Date of Surgery: 10/10/18  Date of Referral to OT: 10/10/18  Referring Physician: David    Admit Date: 10/10/2018       ICD-10-CM ICD-9-CM   1. Healthcare-associated pneumonia J18.9 486   2. Cystitis N30.90 595.9   3. Generalized abdominal pain R10.84 789.07     Patient Active Problem List   Diagnosis   • Chest pain (non-cardiac)   • Atrial fibrillation (CMS/[facility])   • C. difficile diarrhea   • Coronary artery disease   • Diabetes mellitus (CMS/[facility])   • Disease of thyroid gland   • Hypertension   • Pressure ulcer of sacral region   • Ankylosing spondylitis lumbar region (CMS/[facility])   • Neurogenic bladder due to ankylosing spondylitis   • Tracheostomy in place (CMS/[facility])   • PEG (percutaneous endoscopic gastrostomy) status (CMS/[facility])   • Acute UTI   • Sepsis (CMS/[facility])   • Clostridium difficile colitis   • Anticoagulated   • Closed compression fracture of L1 lumbar vertebra (CMS/[facility])   • Tracheostomy status (CMS/[facility])   • Seizures (CMS/[facility])   • PAF (paroxysmal atrial fibrillation) (CMS/[facility])   • Neuromuscular dysfunction of bladder   • Hypothyroidism   • Gastrostomy tube dependent (CMS/[facility])   • COPD (chronic obstructive pulmonary disease) (CMS/[facility])   • CAD (coronary artery disease)   • AV block, 1st degree   • Bifascicular block   • Macrocytic anemia   • Diabetes type 2, controlled (CMS/[facility])   • History of Clostridium difficile colitis   • Anticoagulated   • Healthcare-associated pneumonia   • Chronic indwelling Chandler catheter   • Cystitis     Past Medical History:   Diagnosis Date   • Ankylosing spondylitis lumbar region (CMS/[facility])    • BPH (benign prostatic hyperplasia)    • C. difficile diarrhea    • Constipation    • Coronary artery disease    • Diabetes mellitus (CMS/[facility])    • Dysphagia    • Hyperlipidemia    • Hypertension    •  Hypothyroidism    • MRSA infection    • Neuromuscular dysfunction of bladder    • PAF (paroxysmal atrial fibrillation) (CMS/[facility])    • Pain    • Pressure ulcer of sacral region    • Seizures (CMS/[facility])    • Tracheostomy status (CMS/[facility])      Past Surgical History:   Procedure Laterality Date   • CARDIAC SURGERY     • CORONARY ARTERY BYPASS GRAFT      x 5   • GTUBE INSERTION     • LUMBAR DISCECTOMY FUSION INSTRUMENTATION N/A 9/17/2018    Procedure: T12-L2 POSTERIOR ARTHODESIS FUSION WITH MEDTRONIC NAVIGATION;  Surgeon: Jose Daniel Tyler MD;  Location: [facility] OR;  Service: Neurosurgery   • TRACHEOSTOMY         Therapy Treatment          Rehabilitation Treatment Summary     Row Name 10/12/18 1321             Treatment Time/Intention    Discipline occupational therapist  -RP      Document Type therapy note (daily note)  -RP      Subjective Information no complaints  -RP      Mode of Treatment occupational therapy  -RP      Patient/Family Observations pt received semi-supine in bed w/ no signs of acute distress- pt spouse present  -RP      Care Plan Review evaluation/treatment results reviewed;care plan/treatment goals reviewed;patient/other agree to care plan  -RP      Care Plan Review, Other Participant(s) spouse  -RP      Patient Effort good  -RP      Comment agreeble to sit @ EOB w/ encouragement  -RP      Existing Precautions/Restrictions fall;brace worn when out of bed  -RP      Recorded by [RP] Suki Boucher OT 10/12/18 1409      Row Name 10/12/18 1321             Cognitive Assessment/Intervention- PT/OT    Orientation Status (Cognition) oriented to;person  -RP      Follows Commands (Cognition) follows one step commands;over 90% accuracy;verbal cues/prompting required;repetition of directions required  -RP      Recorded by [RP] Suki Boucher OT 10/12/18 1409      Row Name 10/12/18 1321             Bed Mobility Assessment/Treatment    Bed Mobility Assessment/Treatment supine-sit;sit-supine  -RP       Supine-Sit Monitor (Bed Mobility) moderate assist (50% patient effort);2 person assist;verbal cues;nonverbal cues (demo/gesture)  -RP      Sit-Supine Monitor (Bed Mobility) moderate assist (50% patient effort);2 person assist;verbal cues;nonverbal cues (demo/gesture)  -RP      Bed Mobility, Safety Issues decreased use of arms for pushing/pulling;decreased use of legs for bridging/pushing;impaired trunk control for bed mobility  -RP      Assistive Device (Bed Mobility) bed rails;draw sheet;head of bed elevated  -RP      Comment (Bed Mobility) supine <> sit x2 trials d/t needing to be suctioned upon initial supine > sit transition; VCs and educ regarding log roll technique for bed mobility skills  -RP      Recorded by [RP] Suki Boucher, OT 10/12/18 1409      Row Name 10/12/18 1321             Transfer Assessment/Treatment    Comment (Transfers) NT d/t EKG arrived upon 2nd supine > sit transition- pt does have brace now, will attempt sit > stand next session   -RP      Recorded by [RP] Suki Boucher OT 10/12/18 1409      Row Name 10/12/18 1321             Motor Skills Assessment/Interventions    Additional Documentation Balance (Group)  -RP      Recorded by [RP] Suki Boucher OT 10/12/18 1409      Row Name 10/12/18 1321             Balance    Balance static sitting balance  -RP      Recorded by [RP] Suki Boucher, OT 10/12/18 1409      Row Name 10/12/18 1321             Static Sitting Balance    Level of Monitor (Unsupported Sitting, Static Balance) contact guard assist;standby assist  -RP      Sitting Position (Unsupported Sitting, Static Balance) sitting on edge of bed  -RP      Time Able to Maintain Position (Unsupported Sitting, Static Balance) 1 to 2 minutes  -RP      Comment (Unsupported Sitting, Static Balance) therapy interrupted d/t EKG- likely could have tolerated sitting @ EOB longer  -RP      Recorded by [RP] Suki Boucher OT 10/12/18 1409      Row Name 10/12/18 1329              Positioning and Restraints    Pre-Treatment Position in bed  -RP      Post Treatment Position bed  -RP      In Bed fowlers;call light within reach;encouraged to call for assist;with family/caregiver;with other staff  -RP      Recorded by [RP] Suki Boucher, OT 10/12/18 1409      Row Name 10/12/18 1321             Pain Assessment    Additional Documentation Pain Scale: Word Pre/Post-Treatment (Group)  -RP      Recorded by [RP] Suki Boucher OT 10/12/18 1409      Row Name 10/12/18 1321             Pain Scale: Word Pre/Post-Treatment    Pain: Word Scale, Pretreatment 0 - no pain  -RP      Pain: Word Scale, Post-Treatment 0 - no pain  -RP      Recorded by [RP] Suki Boucher OT 10/12/18 1409      Row Name                Wound 09/15/18 0021 Bilateral coccyx pressure injury    Wound - Properties Group Date first assessed: 09/15/18 [PB] Time first assessed: 0021 [PB] Present On Admission : picture taken [PB] Side: Bilateral [PB] Location: coccyx [PB] Type: pressure injury [PB] Stage, Pressure Injury: Stage 1 [PB] Additional Comments: existing pressure injury on admission. New picture taken 10/10/18 [JT] Recorded by:  [JT] Adria Jaimes RN 10/10/18 1748 [PB] Emely Mcqueen RN 09/15/18 0123    Row Name                Wound 09/17/18 1657 Other (See comments) back incision    Wound - Properties Group Date first assessed: 09/17/18 [JTA] Time first assessed: 1657 [JTA] Side: Other (See comments) [JTA] Location: back [JTA] Type: incision [JTA] Additional Comments: exisitg incision. new picture taken 10/10. [JT] Recorded by:  [JT] Adria Jaimes RN 10/10/18 1752 [JTA] Altagracia Bob RN 09/17/18 1657    Row Name                Wound 10/10/18 1752 Right distal;anterior leg    Wound - Properties Group Date first assessed: 10/10/18 [JT] Time first assessed: 1752 [JT] Present On Admission : yes [JT2] Side: Right [JT] Orientation: distal;anterior [JT] Location: leg [JT] Additional Comments: SCABS [JT] Recorded by:  [JT]  Adria Jaimes RN 10/10/18 1753 [JT2] Adria Jaimes RN 10/10/18 1754    Row Name                Wound 10/10/18 1753 Right distal;anterior leg    Wound - Properties Group Date first assessed: 10/10/18 [JT] Time first assessed: 1753 [JT] Present On Admission : yes [JT] Side: Right [JT] Orientation: distal;anterior [JT] Location: leg [JT] Additional Comments: Scab [JT] Recorded by:  [JT] Adria Jaimes RN 10/10/18 1754    Row Name 10/12/18 1321             Coping    Observed Emotional State accepting;calm;cooperative  -RP      Verbalized Emotional State acceptance  -RP      Recorded by [RP] Suki Boucher, OT 10/12/18 1409      Row Name 10/12/18 1321             Plan of Care Review    Plan of Care Reviewed With patient  -RP      Recorded by [RP] Suki Boucher, OT 10/12/18 1409      Row Name 10/12/18 1321             Outcome Summary/Treatment Plan (OT)    Anticipated Discharge Disposition (OT) skilled nursing facility  -RP      Recorded by [RP] Suki Boucher, OT 10/12/18 1409        User Key  (r) = Recorded By, (t) = Taken By, (c) = Cosigned By    Initials Name Effective Dates Discipline    PB Emely Mcqueen RN 06/16/16 -  Nurse    Adria Ramos RN 06/16/16 -  Nurse    Altagracia Downing RN 06/16/16 -  Nurse    Suki Murillo, OT 05/03/18 -  OT        Wound 09/15/18 0021 Bilateral coccyx pressure injury (Active)   Dressing Appearance dry;intact 10/12/2018  9:45 AM   Dressing Care, Wound border dressing 10/11/2018  8:46 PM       Wound 09/17/18 1657 Other (See comments) back incision (Active)   Dressing Appearance dry;intact 10/12/2018  9:45 AM   Closure Staples 10/12/2018  9:45 AM   Base clean 10/12/2018  9:45 AM   Periwound intact 10/12/2018  9:45 AM   Edges rolled/closed 10/12/2018  9:45 AM   Dressing Care, Wound border dressing 10/12/2018  9:45 AM       Wound 10/10/18 1752 Right distal;anterior leg (Active)   Dressing Appearance open to air 10/12/2018  9:45 AM   Closure Open to air 10/12/2018   9:45 AM   Base scab 10/12/2018  9:45 AM   Periwound intact 10/12/2018  9:45 AM   Edges irregular 10/12/2018  9:45 AM   Drainage Amount none 10/12/2018  9:45 AM   Dressing Care, Wound open to air 10/12/2018  9:45 AM         Occupational Therapy Education     Title: PT OT SLP Therapies (Active)     Topic: Occupational Therapy (Active)     Point: ADL training (Done)     Description: Instruct learner(s) on proper safety adaptation and remediation techniques during self care or transfers.   Instruct in proper use of assistive devices.   Learning Progress Summary     Learner Status Readiness Method Response Comment Documented by    Patient Done Acceptance E VU,NR educated on OT, discussed PLOF AF 10/11/18 1544                      User Key     Initials Effective Dates Name Provider Type Discipline     04/03/18 -  Ryann Gupta, OTR Occupational Therapist OT                OT Recommendation and Plan  Outcome Summary/Treatment Plan (OT)  Anticipated Discharge Disposition (OT): skilled nursing facility  Plan of Care Review  Plan of Care Reviewed With: patient, spouse  Plan of Care Reviewed With: patient, spouse  Outcome Summary: Pt now has brace for OOB activity. With encouragement, pt agreeable to sit @ EOB. Pt completed supine <> sit transition x2 trials d/t needing to be suctioned upon initial supine > sit transition. Following suction and upon sitting @ EOB on second trial, EKG arrived, cutting therapy short. However, pt able to sit @ EOB approx. 2 min w/ CGA-SBA w/ fair static sitting balance noted. Will attempt sit <> stand transfer next time w/ brace donned.        Outcome Measures     Row Name 10/12/18 1400 10/12/18 1320 10/11/18 1548       How much help from another is currently needed...    Putting on and taking off regular lower body clothing?  -- 1  -RP 1  -AF    Bathing (including washing, rinsing, and drying)  -- 2  -RP 2  -AF    Toileting (which includes using toilet bed pan or urinal)  -- 1  -RP 1  -AF     Putting on and taking off regular upper body clothing  -- 2  -RP 2  -AF    Taking care of personal grooming (such as brushing teeth)  -- 3  -RP 3  -AF    Eating meals  -- 1  -RP 1  -AF    Score  -- 10  -RP 10  -AF       Functional Assessment    Outcome Measure Options AM-PAC 6 Clicks Daily Activity (OT)  -RP  -- AM-PAC 6 Clicks Daily Activity (OT)  -AF      User Key  (r) = Recorded By, (t) = Taken By, (c) = Cosigned By    Initials Name Provider Type    AF Ryann Gupta, OTR Occupational Therapist    RP Suki Boucher, OT Occupational Therapist           Time Calculation:         Time Calculation- OT     Row Name 10/12/18 1416             Time Calculation- OT    OT Start Time 1258  -RP      OT Stop Time 1321  -RP      OT Time Calculation (min) 23 min  -RP      Total Timed Code Minutes- OT 23 minute(s)  -RP        User Key  (r) = Recorded By, (t) = Taken By, (c) = Cosigned By    Initials Name Provider Type    Suki Murillo, OT Occupational Therapist           Therapy Suggested Charges     Code   Minutes Charges    None           Therapy Charges for Today     Code Description Service Date Service Provider Modifiers Qty    [id number]  OT THERAPEUTIC ACT EA 15 MIN 10/12/2018 Suki Boucher OT GO 2               Suki Boucher OT  10/12/2018

## 2018-10-12 NOTE — PLAN OF CARE
Problem: Patient Care Overview  Goal: Plan of Care Review  Outcome: Ongoing (interventions implemented as appropriate)   10/12/18 2058   Plan of Care Review   Progress improving   OTHER   Outcome Summary Patient able to sit EOB but slow moving due to pain and difficulty communication due to trach. Unable to stand due to too much back pain in sitting.

## 2018-10-12 NOTE — PROGRESS NOTES
Continued Stay Note  [facility]     Patient Name: Lam Viramontes  MRN: [id number]  Today's Date: 10/12/2018    Admit Date: 10/10/2018          Discharge Plan     Row Name 10/12/18 1343       Plan    Plan return to [place]- [facility] notification to be obtained today and bed available Sunday    Patient/Family in Agreement with Plan yes    Plan Comments Per Dr Burrell, patient may be ready for dc over the weekend.  Spoke with [facility] and she will ask facility to obtain [facility] notification today.  She anticipates they will have a bed on Sunday.  If dc'd Sunday, weekend CCP to call [facility] to find out bed assignment.  Transfer packet in [facility].  CCP will follow. Soha Kovacs RN              Discharge Codes    No documentation.           Soha Kovacs RN

## 2018-10-12 NOTE — CONSULTS
Dr. Tyler currently out of town. Asked to see this patient given recent thoracolumbar surgery with Dr. Tyler.       Patient is now POD 25 T12-L2 posterior instrumented arthrodesis and pedicle screw fixation for an unstable L1 fracture that resulted from a fall.  The patient was scheduled to be seen by Dr. Tyler in his office October 4, 2018 but was a no-show.  He was rescheduled in the office for today but unfortunately the patient has subsequently been admitted to the hospital.      Mr. Viramontes has a history of ankylosing spondylitis. He also has a trach and PEG. He is quite debilitated he had been at [place] for rehabilitation.  According to the H&P.  Began having abdominal pain for the last 5 days.  Workup in the emergency room revealed left lower lobe pneumonia and possible cystitis UTI.  He is subsequently admitted to the hospital.  We have been asked to give a neurosurgical opinion regarding his progress since the recent surgery.  The patient is able to nod his head yes and no to questions.  He denies any back pain or leg pain.     .  Vitals:    10/12/18 0855   BP: 125/63   Pulse: 81   Resp: 16   Temp: 96.5 °F (35.8 °C)   SpO2: 96%       The thoracolumbar incision is practically healed with no redness, swelling, or drainage.  The staples are still intact and will be removed today.  He is moving his legs bilaterally without difficulty.  Sensory deficits on exam.  Great leg raise negative bilaterally.      POD 25 T12-L2 posterior instrumented arthrodesis and pedicle screw fixation for an unstable L1 fracture   Ankylosing spondylitis  PNA      DC thoracolumbar incision staples  Follow-up appointment with Dr. Tyler has been rearranged October 29, 2018 at 1:00 PM at the [facility] in [facility].  Please do not hesitate to call us if there are any further questions or concerns.

## 2018-10-12 NOTE — PROGRESS NOTES
Name: Lam Viramontes ADMIT: 10/10/2018   : 1930  PCP: Suman Jaimes III, MD    MRN: [id number] LOS: 2 days   AGE/SEX: 88 y.o. male  ROOM: [facility]   Subjective   CC: abdominal discomfort  No acute events.  Abdominal pain has improved.  Dyspnea is chronic and stable.  Not getting much from suctioning per RT.  No f/c/n/v/d.  Tolerating TF.      Objective   Vital Signs  Temp:  [96.5 °F (35.8 °C)-97.3 °F (36.3 °C)] 96.5 °F (35.8 °C)  Heart Rate:  [] 93  Resp:  [16-20] 20  BP: (116-128)/(63-73) 128/73  SpO2:  [90 %-100 %] 96 %  on  Flow (L/min):  [8] 8;   Device (Oxygen Therapy): T - piece  Body mass index is 21.56 kg/m².    Physical Exam   Constitutional: No distress.   HENT:   Head: Normocephalic and atraumatic.   Mouth/Throat: Oropharynx is clear and moist.   Tracheostomy in place with t-piece   Eyes: Pupils are equal, round, and reactive to light. Conjunctivae and EOM are normal.   Neck: Normal range of motion. Neck supple. No JVD present.   Cardiovascular: Normal rate, regular rhythm and intact distal pulses.    Pulmonary/Chest: Effort normal. He has decreased breath sounds in the right lower field and the left lower field.   Coarse breath sounds   Abdominal: Soft. Bowel sounds are normal. There is no tenderness.   PEG tube in place   Musculoskeletal: He exhibits no edema or tenderness.   Neurological: He is alert.   Skin: Skin is warm and dry. He is not diaphoretic.   Psychiatric: He has a normal mood and affect. His behavior is normal.   Nursing note and vitals reviewed.      Results Review:       I reviewed the patient's new clinical results.    Results from last 7 days  Lab Units 10/12/18  0619 10/11/18  0759 10/10/18  1334   WBC 10*3/mm3 7.50 7.68 6.71   HEMOGLOBIN g/dL 9.5* 9.5* 11.0*   PLATELETS 10*3/mm3 313 333 402       Results from last 7 days  Lab Units 10/12/18  0618 10/11/18  0759 10/10/18  1334   SODIUM mmol/L 140 137 137   POTASSIUM mmol/L 4.2 4.1 4.4   CHLORIDE mmol/L 104 100 94*    CO2 mmol/L 27.0 29.3* 34.8*   BUN mg/dL 19 20 27*   CREATININE mg/dL 0.77 0.80 0.85   GLUCOSE mg/dL 86 151* 88   Estimated Creatinine Clearance: 59.8 mL/min (by C-G formula based on SCr of 0.77 mg/dL).    Results from last 7 days  Lab Units 10/12/18  0618 10/11/18  0759 10/10/18  1334   CALCIUM mg/dL 8.8 8.9 9.9   ALBUMIN g/dL  --   --  3.40*   MAGNESIUM mg/dL  --   --  2.4         apixaban 5 mg Per G Tube Q12H   atorvastatin 40 mg Per G Tube Daily   budesonide 0.5 mg Nebulization BID - RT   doxazosin 2 mg Per G Tube Q24H   insulin aspart 0-7 Units Subcutaneous 4x Daily With Meals & Nightly   ipratropium-albuterol 3 mL Nebulization 4x Daily - RT   lacosamide 100 mg Per G Tube Q12H   levETIRAcetam 1,500 mg Per G Tube Q12H   levothyroxine 75 mcg Per G Tube Q AM   linagliptin 5 mg Per G Tube Daily   midodrine 10 mg Per G Tube TID   piperacillin-tazobactam 4.5 g Intravenous Q8H   polyethylene glycol 17 g Per G Tube Daily   sodium chloride 3 mL Intravenous Q12H       Pharmacy to Dose Zosyn    NPO Diet      Assessment/Plan      Active Hospital Problems    Diagnosis Date Noted   • Healthcare-associated pneumonia [J18.9] 10/10/2018   • Chronic indwelling Chandler catheter [Z92.89] 10/10/2018   • Cystitis [N30.90] 10/10/2018   • Tracheostomy status (CMS/[facility]) [Z93.0] 09/15/2018   • Seizures (CMS/[facility]) [R56.9] 09/15/2018   • PAF (paroxysmal atrial fibrillation) (CMS/[facility]) [I48.0] 09/15/2018   • Neuromuscular dysfunction of bladder [N31.9] 09/15/2018   • Hypothyroidism [E03.9] 09/15/2018   • Diabetes type 2, controlled (CMS/[facility]) [E11.9] 09/15/2018   • Anticoagulated [Z79.01] 09/15/2018   • PEG (percutaneous endoscopic gastrostomy) status (CMS/HCC) [Z93.1] 12/29/2017   • Hypertension [I10] 12/29/2017      Resolved Hospital Problems    Diagnosis Date Noted Date Resolved   No resolved problems to display.   Abdominal Discomfort  - likely related to constipation, no other significant abdominal findings on CT  - continue bowel  regimen  - tolerating TF well    LLL PNA  - MRSA screen negative  - respiratory culture growing pseudomonas  - stop vancomycin, continue on zosyn  - will await sensitivities but hopefully can switch to levofloxacin  - pulm toilet, wean oxygen as tolerated-maintaining high 90s sats with 28% O2 through T-piece    Neurogenic Bladder  - has chronic clarke catheter  - no urinary complaints, no urine cx indicated  - urology has seen, appreciate recs    Type 2 DM  - a1c 6.50%  - continue linagliptin   - cover with ssi    Second Degree AV block  - asymptomatic  - 24h holter pending, echo results noted  - cardiology following, appreciate recs    DVT Prophylaxis  - on eliquis for afib    Disposition  [facility] Home over the weekend if okay with cardiology    Adiel Burrell MD  [facility]  10/12/18  4:19 PM

## 2018-10-12 NOTE — PLAN OF CARE
Problem: Patient Care Overview  Goal: Plan of Care Review  Outcome: Ongoing (interventions implemented as appropriate)   10/12/18 1410   Coping/Psychosocial   Plan of Care Reviewed With patient;spouse   Plan of Care Review   Progress improving   OTHER   Outcome Summary Pt now has brace for OOB activity. With encouragement, pt agreeable to sit @ EOB. Pt completed supine <> sit transition x2 trials d/t needing to be suctioned upon initial supine > sit transition. Following suction and upon sitting @ EOB on second trial, EKG arrived, cutting therapy short. However, pt able to sit @ EOB approx. 2 min w/ CGA-SBA w/ fair static sitting balance noted. Will attempt sit <> stand transfer next time w/ brace donned.

## 2018-10-12 NOTE — THERAPY TREATMENT NOTE
Acute Care - Physical Therapy Treatment Note  [facility]     Patient Name: Lam Viramontes  : 1930  MRN: [id number]  Today's Date: 10/12/2018  Onset of Illness/Injury or Date of Surgery: 10/10/18     Referring Physician: David    Admit Date: 10/10/2018    Visit Dx:    ICD-10-CM ICD-9-CM   1. Healthcare-associated pneumonia J18.9 486   2. Cystitis N30.90 595.9   3. Generalized abdominal pain R10.84 789.07     Patient Active Problem List   Diagnosis   • Chest pain (non-cardiac)   • Atrial fibrillation (CMS/[facility])   • C. difficile diarrhea   • Coronary artery disease   • Diabetes mellitus (CMS/[facility])   • Disease of thyroid gland   • Hypertension   • Pressure ulcer of sacral region   • Ankylosing spondylitis lumbar region (CMS/[facility])   • Neurogenic bladder due to ankylosing spondylitis   • Tracheostomy in place (CMS/[facility])   • PEG (percutaneous endoscopic gastrostomy) status (CMS/[facility])   • Acute UTI   • Sepsis (CMS/[facility])   • Clostridium difficile colitis   • Anticoagulated   • Closed compression fracture of L1 lumbar vertebra (CMS/[facility])   • Tracheostomy status (CMS/[facility])   • Seizures (CMS/[facility])   • PAF (paroxysmal atrial fibrillation) (CMS/[facility])   • Neuromuscular dysfunction of bladder   • Hypothyroidism   • Gastrostomy tube dependent (CMS/[facility])   • COPD (chronic obstructive pulmonary disease) (CMS/[facility])   • CAD (coronary artery disease)   • AV block, 1st degree   • Bifascicular block   • Macrocytic anemia   • Diabetes type 2, controlled (CMS/[facility])   • History of Clostridium difficile colitis   • Anticoagulated   • Healthcare-associated pneumonia   • Chronic indwelling Chandler catheter   • Cystitis       Therapy Treatment          Rehabilitation Treatment Summary     Row Name 10/12/18 1550 10/12/18 1321          Treatment Time/Intention    Discipline physical therapist  -HARRISON occupational therapist  -RP     Document Type therapy note (daily note)  -HARRISON therapy note (daily note)  -RP     Subjective Information complains of;pain   -KH no complaints  -RP     Mode of Treatment individual therapy;physical therapy  -KH occupational therapy  -RP     Patient/Family Observations  -- pt received semi-supine in bed w/ no signs of acute distress- pt spouse present  -RP     Care Plan Review  -- evaluation/treatment results reviewed;care plan/treatment goals reviewed;patient/other agree to care plan  -RP     Care Plan Review, Other Participant(s)  -- spouse  -RP     Therapy Frequency (PT Clinical Impression) 5 times/wk  -KH  --     Patient Effort good  -KH good  -RP     Comment Patient agreeable to therapy but having some trouble breathing and in visible pain  -KH agreeble to sit @ EOB w/ encouragement  -RP     Existing Precautions/Restrictions fall;brace worn when out of bed  -KH fall;brace worn when out of bed  -RP     Recorded by [KH] Rosa Elena De La Rosa, PT 10/12/18 1553 [RP] Suki Boucher, OT 10/12/18 1409     Row Name 10/12/18 1550             Vital Signs    Pre SpO2 (%) 100  -KH      O2 Delivery Pre Treatment trach collar  -KH      Post SpO2 (%) 98  -KH      O2 Delivery Post Treatment supplemental O2  -KH      Recorded by [KH] Rosa Elena De La Rosa, PT 10/12/18 1553      Row Name 10/12/18 1550 10/12/18 1321          Cognitive Assessment/Intervention- PT/OT    Orientation Status (Cognition) oriented to;person;place;situation  -KH oriented to;person  -RP     Follows Commands (Cognition) follows one step commands;over 90% accuracy;verbal cues/prompting required;repetition of directions required  -KH follows one step commands;over 90% accuracy;verbal cues/prompting required;repetition of directions required  -RP     Recorded by [KH] Rosa Elena De La Rosa, PT 10/12/18 1553 [RP] Suki Boucher, OT 10/12/18 1409     Row Name 10/12/18 1550 10/12/18 1321          Bed Mobility Assessment/Treatment    Bed Mobility Assessment/Treatment supine-sit;sit-supine;scooting/bridging  -KH supine-sit;sit-supine  -RP     Scooting/Bridging [place] (Bed Mobility) 2 person assist  -  --      Supine-Sit Kay (Bed Mobility) moderate assist (50% patient effort);maximum assist (25% patient effort);2 person assist  -KH moderate assist (50% patient effort);2 person assist;verbal cues;nonverbal cues (demo/gesture)  -RP     Sit-Supine [place] (Bed Mobility) moderate assist (50% patient effort);maximum assist (25% patient effort);2 person assist  -KH moderate assist (50% patient effort);2 person assist;verbal cues;nonverbal cues (demo/gesture)  -RP     Bed Mobility, Safety Issues decreased use of arms for pushing/pulling;decreased use of legs for bridging/pushing;impaired trunk control for bed mobility  -KH decreased use of arms for pushing/pulling;decreased use of legs for bridging/pushing;impaired trunk control for bed mobility  -RP     Assistive Device (Bed Mobility) bed rails;draw sheet;head of bed elevated  -KH bed rails;draw sheet;head of bed elevated  -RP     Comment (Bed Mobility)  -- supine <> sit x2 trials d/t needing to be suctioned upon initial supine > sit transition; VCs and educ regarding log roll technique for bed mobility skills  -RP     Recorded by [KH] Rosa Elena De La Rosa, PT 10/12/18 1553 [RP] Suki Boucher, OT 10/12/18 1409     Row Name 10/12/18 1550 10/12/18 1321          Transfer Assessment/Treatment    Comment (Transfers) unable to attempt transfer- too much back pain when sitting  -KH NT d/t EKG arrived upon 2nd supine > sit transition- pt does have brace now, will attempt sit > stand next session   -RP     Recorded by [KH] Rosa Elena De La Rosa, PT 10/12/18 1553 [RP] Suki Boucher, OT 10/12/18 1409     Row Name 10/12/18 1321             Motor Skills Assessment/Interventions    Additional Documentation Balance (Group)  -RP      Recorded by [RP] Suki Boucher OT 10/12/18 1409      Row Name 10/12/18 1321             Balance    Balance static sitting balance  -RP      Recorded by [RP] Suki Boucher, OT 10/12/18 1409      Row Name 10/12/18 1550 10/12/18 1321          Static Sitting  Balance    Level of [place] (Unsupported Sitting, Static Balance) minimal assist, 75% patient effort  - contact guard assist;standby assist  -RP     Sitting Position (Unsupported Sitting, Static Balance) sitting on edge of bed  -KH sitting on edge of bed  -RP     Time Able to Maintain Position (Unsupported Sitting, Static Balance) 1 to 2 minutes  -KH 1 to 2 minutes  -RP     Comment (Unsupported Sitting, Static Balance) patient laid back down due to back pain  - therapy interrupted d/t EKG- likely could have tolerated sitting @ EOB longer  -RP     Recorded by [KH] Rosa Elena De La Rosa, PT 10/12/18 1553 [RP] Suki Boucher, OT 10/12/18 1409     Row Name 10/12/18 1550 10/12/18 1321          Positioning and Restraints    Pre-Treatment Position in bed  -KH in bed  -RP     Post Treatment Position bed  - bed  -RP     In Bed supine;call light within reach;encouraged to call for assist  - fowlers;call light within reach;encouraged to call for assist;with family/caregiver;with other staff  -RP     Recorded by [KH] Rosa Elena De La Rosa, PT 10/12/18 1553 [RP] Suki Boucher, OT 10/12/18 1409     Row Name 10/12/18 1550 10/12/18 1321          Pain Assessment    Additional Documentation Pain Scale: Numbers Pre/Post-Treatment (Group)  - Pain Scale: Word Pre/Post-Treatment (Group)  -RP     Recorded by [KH] Rosa Elena De La Rosa, PT 10/12/18 155 [RP] Suki Boucher, OT 10/12/18 1409     Row Name 10/12/18 1550             Pain Scale: Numbers Pre/Post-Treatment    Pain Scale: Numbers, Pretreatment 9/10  -KH      Pain Scale: Numbers, Post-Treatment 9/10  -KH      Pain Location back  -      Pain Intervention(s) Repositioned  -KH      Recorded by [KH] Rosa Elena De La Rosa, PT 10/12/18 1555      Row Name 10/12/18 1321             Pain Scale: Word Pre/Post-Treatment    Pain: Word Scale, Pretreatment 0 - no pain  -RP      Pain: Word Scale, Post-Treatment 0 - no pain  -RP      Recorded by [RP] Suki Boucher, OT 10/12/18 1409      Row Name                 Wound 09/15/18 0021 Bilateral coccyx pressure injury    Wound - Properties Group Date first assessed: 09/15/18 [PB] Time first assessed: 0021 [PB] Present On Admission : picture taken [PB] Side: Bilateral [PB] Location: coccyx [PB] Type: pressure injury [PB] Stage, Pressure Injury: Stage 1 [PB] Additional Comments: existing pressure injury on admission. New picture taken 10/10/18 [JT] Recorded by:  [JT] Adria Jaimes RN 10/10/18 1748 [PB] Emely Mcqueen RN 09/15/18 0123    Row Name                Wound 09/17/18 1657 Other (See comments) back incision    Wound - Properties Group Date first assessed: 09/17/18 [JTA] Time first assessed: 1657 [JTA] Side: Other (See comments) [JTA] Location: back [JTA] Type: incision [JTA] Additional Comments: exisitg incision. new picture taken 10/10. [JT] Recorded by:  [JT] Adria aJimes RN 10/10/18 1752 [JTA] Altagracia Bob RN 09/17/18 1657    Row Name                Wound 10/10/18 1752 Right distal;anterior leg    Wound - Properties Group Date first assessed: 10/10/18 [JT] Time first assessed: 1752 [JT] Present On Admission : yes [JT2] Side: Right [JT] Orientation: distal;anterior [JT] Location: leg [JT] Additional Comments: SCABS [JT] Recorded by:  [JT] Adria Jaimes RN 10/10/18 1753 [JT2] Adria Jaimes RN 10/10/18 1754    Row Name                Wound 10/10/18 1753 Right distal;anterior leg    Wound - Properties Group Date first assessed: 10/10/18 [JT] Time first assessed: 1753 [JT] Present On Admission : yes [JT] Side: Right [JT] Orientation: distal;anterior [JT] Location: leg [JT] Additional Comments: Scab [JT] Recorded by:  [JT] Adria Jaimes RN 10/10/18 1754    Row Name 10/12/18 1321             Coping    Observed Emotional State accepting;calm;cooperative  -RP      Verbalized Emotional State acceptance  -RP      Recorded by [RP] Suki Boucher, OT 10/12/18 1409      Row Name 10/12/18 1321             Plan of Care Review    Plan of Care Reviewed With  patient  -RP      Recorded by [RP] uSki Boucher, OT 10/12/18 1409      Row Name 10/12/18 1321             Outcome Summary/Treatment Plan (OT)    Anticipated Discharge Disposition (OT) skilled nursing facility  -RP      Recorded by [RP] Suki Boucher, OT 10/12/18 1409        User Key  (r) = Recorded By, (t) = Taken By, (c) = Cosigned By    Initials Name Effective Dates Discipline    PB Emely Mcqueen RN 06/16/16 -  Nurse    Adria Ramos, RN 06/16/16 -  Nurse    Altagracia Downing RN 06/16/16 -  Nurse    Rosa Elena Faust, KAMI 06/22/16 -  PT    RP Suki Boucher OT 05/03/18 -  OT          Wound 09/15/18 0021 Bilateral coccyx pressure injury (Active)   Dressing Appearance dry;intact 10/12/2018  9:45 AM   Dressing Care, Wound border dressing 10/11/2018  8:46 PM       Wound 09/17/18 1657 Other (See comments) back incision (Active)   Dressing Appearance dry;intact 10/12/2018  9:45 AM   Closure Staples 10/12/2018  9:45 AM   Base clean 10/12/2018  9:45 AM   Periwound intact 10/12/2018  9:45 AM   Edges rolled/closed 10/12/2018  9:45 AM   Dressing Care, Wound border dressing 10/12/2018  9:45 AM       Wound 10/10/18 1752 Right distal;anterior leg (Active)   Dressing Appearance open to air 10/12/2018  9:45 AM   Closure Open to air 10/12/2018  9:45 AM   Base scab 10/12/2018  9:45 AM   Periwound intact 10/12/2018  9:45 AM   Edges irregular 10/12/2018  9:45 AM   Drainage Amount none 10/12/2018  9:45 AM   Dressing Care, Wound open to air 10/12/2018  9:45 AM             Physical Therapy Education     Title: PT OT SLP Therapies (Active)     Topic: Physical Therapy (Done)     Point: Mobility training (Done)    Learning Progress Summary     Learner Status Readiness Method Response Comment Documented by    Patient Done Acceptance E,TB LUCIA  KH 10/12/18 1556     Done Acceptance E VU  AR 10/11/18 1215    Family Done Acceptance E VU  AR 10/11/18 1215          Point: Home exercise program (Done)    Learning Progress  Summary     Learner Status Readiness Method Response Comment Documented by    Patient Done Acceptance E VU  AR 10/11/18 1215    Family Done Acceptance E VU  AR 10/11/18 1215          Point: Body mechanics (Done)    Learning Progress Summary     Learner Status Readiness Method Response Comment Documented by    Patient Done Acceptance EALHAJI  KH 10/12/18 1556     Done Acceptance E VU  AR 10/11/18 1215    Family Done Acceptance E VU  AR 10/11/18 1215          Point: Precautions (Done)    Learning Progress Summary     Learner Status Readiness Method Response Comment Documented by    Patient Done Acceptance E,ALHAJI VU  KH 10/12/18 1556     Done Acceptance E VU  AR 10/11/18 1215    Family Done Acceptance E VU  AR 10/11/18 1215                      User Key     Initials Effective Dates Name Provider Type Discipline    AR 04/03/18 -  Patience Walker, PT Physical Therapist PT     06/22/16 -  Rosa Elena De La Rosa, PT Physical Therapist PT                    PT Recommendation and Plan  Therapy Frequency (PT Clinical Impression): 5 times/wk  Progress: improving  Outcome Summary: Patient able to sit EOB but slow moving due to pain and difficulty communication due to trach. Unable to stand due to too much back pain in sitting.           Outcome Measures     Row Name 10/12/18 1500 10/12/18 1400 10/12/18 1320       How much help from another person do you currently need...    Turning from your back to your side while in flat bed without using bedrails? 2  -KH  --  --    Moving from lying on back to sitting on the side of a flat bed without bedrails? 2  -KH  --  --    Moving to and from a bed to a chair (including a wheelchair)? 1  -KH  --  --    Standing up from a chair using your arms (e.g., wheelchair, bedside chair)? 1  -KH  --  --    Climbing 3-5 steps with a railing? 1  -KH  --  --    To walk in hospital room? 1  -KH  --  --    AM-PAC 6 Clicks Score 8  -KH  --  --       How much help from another is currently needed...    Putting on  and taking off regular lower body clothing?  --  -- 1  -RP    Bathing (including washing, rinsing, and drying)  --  -- 2  -RP    Toileting (which includes using toilet bed pan or urinal)  --  -- 1  -RP    Putting on and taking off regular upper body clothing  --  -- 2  -RP    Taking care of personal grooming (such as brushing teeth)  --  -- 3  -RP    Eating meals  --  -- 1  -RP    Score  --  -- 10  -RP       Functional Assessment    Outcome Measure Options AM-PAC 6 Clicks Basic Mobility (PT)  - AM-PAC 6 Clicks Daily Activity (OT)  -RP  --    Row Name 10/11/18 1548             How much help from another is currently needed...    Putting on and taking off regular lower body clothing? 1  -AF      Bathing (including washing, rinsing, and drying) 2  -AF      Toileting (which includes using toilet bed pan or urinal) 1  -AF      Putting on and taking off regular upper body clothing 2  -AF      Taking care of personal grooming (such as brushing teeth) 3  -AF      Eating meals 1  -AF      Score 10  -AF         Functional Assessment    Outcome Measure Options AM-PAC 6 Clicks Daily Activity (OT)  -AF        User Key  (r) = Recorded By, (t) = Taken By, (c) = Cosigned By    Initials Name Provider Type    Ryann Mcclain, OTR Occupational Therapist    Rosa Elena Faust, PT Physical Therapist    Suki Murillo, OT Occupational Therapist           Time Calculation:         PT Charges     Row Name 10/12/18 1549 10/12/18 0953          Time Calculation    Start Time 1530  -KH  --     Stop Time 1555  -KH  --     Time Calculation (min) 25 min  -KH  --     PT Received On 10/12/18  -HARRISON  --     PT - Next Appointment 10/15/18  - 10/12/18  -MA       User Key  (r) = Recorded By, (t) = Taken By, (c) = Cosigned By    Initials Name Provider Type    Rosa Elena Faust, PT Physical Therapist    Anita Mina, PT Physical Therapist        Therapy Suggested Charges     Code   Minutes Charges    None           Therapy Charges for  Today     Code Description Service Date Service Provider Modifiers Qty    92278830823  PT THER PROC EA 15 MIN 10/12/2018 Rosa Elena De La Rosa, PT GP 1    31762324177 HC PT THER SUPP EA 15 MIN 10/12/2018 Rosa Elena De La Rosa, PT GP 1          PT G-Codes  Outcome Measure Options: AM-PAC 6 Clicks Basic Mobility (PT)  AM-PAC 6 Clicks Score: 8  Score: 10    Rosa Elena De La Rosa, PT  10/12/2018

## 2018-10-12 NOTE — PROGRESS NOTES
[facility]  Cardiology Progress Note    Patient Identification:  Name: Lam Viramontes  Age: 88 y.o.  Sex: male  :  1930  MRN: [id number]                 Follow Up / Chief Complaint: new 2nd degree AV block    Interval History:  Patient is a 88 y.o. male presents with history of Afib (eliquis), CAD s/p CABG, diastolic dysfunction, h/o transient AV block's, hypertension, COPD, seizures, tracheostomy,  G-tube, hypothyroidism, HLD, and DM who presented to ER due to abdominal pain.  Echo pending plans for later this morning.        Subjective:   Patient nonverbal but communicates with nodding yes or no. denies chest pain/pressure, n/v, ha, and dizziness. He continues to communicate abdominal pain.    Objective:    Temp:  [96.5 °F (35.8 °C)-98.1 °F (36.7 °C)] 96.5 °F (35.8 °C)  Heart Rate:  [62-89] 81  Resp:  [16-18] 16  BP: (107-125)/(63-75) 125/63  SpO2:  [90 %-100 %] 96 %        Past Medical History:  Past Medical History:   Diagnosis Date   • Ankylosing spondylitis lumbar region (CMS/HCC)    • BPH (benign prostatic hyperplasia)    • C. difficile diarrhea    • Constipation    • Coronary artery disease    • Diabetes mellitus (CMS/HCC)    • Dysphagia    • Hyperlipidemia    • Hypertension    • Hypothyroidism    • MRSA infection    • Neuromuscular dysfunction of bladder    • PAF (paroxysmal atrial fibrillation) (CMS/HCC)    • Pain    • Pressure ulcer of sacral region    • Seizures (CMS/HCC)    • Tracheostomy status (CMS/HCC)      Past Surgical History:  Past Surgical History:   Procedure Laterality Date   • CARDIAC SURGERY     • CORONARY ARTERY BYPASS GRAFT      x 5   • GTUBE INSERTION     • LUMBAR DISCECTOMY FUSION INSTRUMENTATION N/A 2018    Procedure: T12-L2 POSTERIOR ARTHODESIS FUSION WITH MEDTRONIC NAVIGATION;  Surgeon: Jose Daniel Tyler MD;  Location: [facility];  Service: Neurosurgery   • TRACHEOSTOMY          Social History:   Social History   Substance Use Topics   • Smoking  status: Former Smoker   • Smokeless tobacco: Never Used   • Alcohol use No      Family History:  History reviewed. No pertinent family history.       Allergies:  No Known Allergies  Scheduled Meds:    apixaban 5 mg Q12H   atorvastatin 40 mg Daily   budesonide 0.5 mg BID - RT   doxazosin 2 mg Q24H   insulin aspart 0-7 Units 4x Daily With Meals & Nightly   ipratropium-albuterol 3 mL 4x Daily - RT   lacosamide 100 mg Q12H   levETIRAcetam 1,500 mg Q12H   levothyroxine 75 mcg Q AM   linagliptin 5 mg Daily   midodrine 10 mg TID   piperacillin-tazobactam 4.5 g Q8H   polyethylene glycol 17 g Daily   sodium chloride 3 mL Q12H   vancomycin 750 mg Q12H           INTAKE AND OUTPUT:    Intake/Output Summary (Last 24 hours) at 10/12/18 0917  Last data filed at 10/12/18 0812   Gross per 24 hour   Intake             4100 ml   Output             3450 ml   Net              650 ml       Review of Systems:    GI: Denies n/v, complains of abdominal pain  Cardiac:  Denies chest pain/pressure  Pulmonary: denies shortness of breath    Constitutional:  Temp:  [96.5 °F (35.8 °C)-98.1 °F (36.7 °C)] 96.5 °F (35.8 °C)  Heart Rate:  [62-89] 81  Resp:  [16-18] 16  BP: (107-125)/(63-75) 125/63   SpO2:  [90 %-100 %] 96 %    Physical Exam:  General:  Appears in no acute distress, resting, nonverbal will nod yes or no   Eyes:  Nonicteric, no conjunctival drainage  HEENT:  No JVD. Thyroid not visibly enlarged. No mucosal pallor or cyanosis  Respiratory: Respirations regular and unlabored at rest. BBS with good air entry in all fields. No crackles, rubs or wheezes auscultated, trach collar on O2  Cardiovascular: S1S2 Regular rate and rhythm. No murmur, rub or gallop.   DP/PT pulses. No pretibial pitting edema  Gastrointestinal: Abdomen soft, flat, non tender. Bowel sounds present. No hepatosplenomegaly. No ascites  Musculoskeletal: LUNSFORD x4. No abnormal movements  Extremities: No digital clubbing or cyanosis  Skin:  Skin warm and dry to touch. No rashes     Neuro: AAO x3 CN II-XII grossly intact  Psych: Mood and affect normal, pleasant and cooperative          Cardiographics  Telemetry: Awaiting new 12 lead EKG for comparison for definitive assessment    10/12/18: possible AV Block Type 2, 2 to 1 at 12:48 am        10/12/18 at 8:56 am        ECG: 10/12/18 pending    10/11/18               09/23/18         Echocardiogram: Pending    Stress test:  12/29/17     Interpretation Summary      · Equivocal ECG evidence of myocardial ischemia.Negative clinical evidence of myocardial ischemia.  · Left ventricular ejection fraction is normal (Calculated EF = 64%).  · Myocardial perfusion imaging indicates a normal myocardial perfusion study with no evidence of ischemia.  · Impressions are consistent with a low risk study.            Lab Review     Results from last 7 days  Lab Units 10/11/18  1750 10/11/18  1156   TROPONIN T ng/mL <0.010 <0.010       Results from last 7 days  Lab Units 10/10/18  1334   MAGNESIUM mg/dL 2.4       Results from last 7 days  Lab Units 10/12/18  0618   SODIUM mmol/L 140   POTASSIUM mmol/L 4.2   BUN mg/dL 19   CREATININE mg/dL 0.77   CALCIUM mg/dL 8.8     Probnp: 343.6    Results from last 7 days  Lab Units 10/12/18  0619 10/11/18  0759 10/10/18  1334   WBC 10*3/mm3 7.50 7.68 6.71   HEMOGLOBIN g/dL 9.5* 9.5* 11.0*   HEMATOCRIT % 32.1* 30.1* 34.4*   PLATELETS 10*3/mm3 313 333 402             Assessment:    Healthcare associated Pneumonia  Second degree block type 1  Bradycardia  HTN  Hx of Afib  abdomonial pain  HLD  Hx of dysphasia  Seizures  DVT proph  DM  Hypothyrodism  Hx of COPD   accelerated Junctional rhythm     Plan:  -Second degree block type 1-asymptomatic, no chronotropic medications noted on home medications or hosp. Medications, Trop. X's 2 neg., TSH wnl, Echo pending   -Bradycardia- asymptomatic, currently 80's  -HTN: well controlled on medications  -hx of afib- Spoke with Dr. Akers regarding EKG (10/12/18 at 13:21pm). He  agreed the  "rhythm was an accelerated Junctional rhythm with no changes to current medical management. Will continue to monitor rhythm. Currently on eliquis 5 mg.      Labs/tests ordered for am: ekg    30 min spent in total at bedside, chart, telemetry review, and coordination in care with other care team members.    10/12/2018  MARK Leahy/Transcription:   \"Dictated utilizing Dragon dictation\".       "

## 2018-10-13 LAB
ANION GAP SERPL CALCULATED.3IONS-SCNC: 9.2 MMOL/L
BACTERIA SPEC RESP CULT: ABNORMAL
BACTERIA SPEC RESP CULT: ABNORMAL
BASOPHILS # BLD AUTO: 0.01 10*3/MM3 (ref 0–0.2)
BASOPHILS NFR BLD AUTO: 0.1 % (ref 0–1.5)
BUN BLD-MCNC: 19 MG/DL (ref 8–23)
BUN/CREAT SERPL: 24.1 (ref 7–25)
CALCIUM SPEC-SCNC: 8.8 MG/DL (ref 8.6–10.5)
CHLORIDE SERPL-SCNC: 103 MMOL/L (ref 98–107)
CO2 SERPL-SCNC: 25.8 MMOL/L (ref 22–29)
CREAT BLD-MCNC: 0.79 MG/DL (ref 0.76–1.27)
DEPRECATED RDW RBC AUTO: 57.6 FL (ref 37–54)
EOSINOPHIL # BLD AUTO: 0.25 10*3/MM3 (ref 0–0.7)
EOSINOPHIL NFR BLD AUTO: 3 % (ref 0.3–6.2)
ERYTHROCYTE [DISTWIDTH] IN BLOOD BY AUTOMATED COUNT: 14.6 % (ref 11.5–14.5)
GFR SERPL CREATININE-BSD FRML MDRD: 93 ML/MIN/1.73
GLUCOSE BLD-MCNC: 92 MG/DL (ref 65–99)
GLUCOSE BLDC GLUCOMTR-MCNC: 106 MG/DL (ref 70–130)
GLUCOSE BLDC GLUCOMTR-MCNC: 187 MG/DL (ref 70–130)
GLUCOSE BLDC GLUCOMTR-MCNC: 195 MG/DL (ref 70–130)
GLUCOSE BLDC GLUCOMTR-MCNC: 230 MG/DL (ref 70–130)
GRAM STN SPEC: ABNORMAL
HCT VFR BLD AUTO: 30.6 % (ref 40.4–52.2)
HGB BLD-MCNC: 9.1 G/DL (ref 13.7–17.6)
IMM GRANULOCYTES # BLD: 0.02 10*3/MM3 (ref 0–0.03)
IMM GRANULOCYTES NFR BLD: 0.2 % (ref 0–0.5)
LYMPHOCYTES # BLD AUTO: 1.51 10*3/MM3 (ref 0.9–4.8)
LYMPHOCYTES NFR BLD AUTO: 18.4 % (ref 19.6–45.3)
MCH RBC QN AUTO: 32.6 PG (ref 27–32.7)
MCHC RBC AUTO-ENTMCNC: 29.7 G/DL (ref 32.6–36.4)
MCV RBC AUTO: 109.7 FL (ref 79.8–96.2)
MONOCYTES # BLD AUTO: 0.58 10*3/MM3 (ref 0.2–1.2)
MONOCYTES NFR BLD AUTO: 7.1 % (ref 5–12)
MRSA SPEC QL CULT: NORMAL
NEUTROPHILS # BLD AUTO: 5.86 10*3/MM3 (ref 1.9–8.1)
NEUTROPHILS NFR BLD AUTO: 71.4 % (ref 42.7–76)
NRBC BLD MANUAL-RTO: 0 /100 WBC (ref 0–0)
PLATELET # BLD AUTO: 296 10*3/MM3 (ref 140–500)
PMV BLD AUTO: 10 FL (ref 6–12)
POTASSIUM BLD-SCNC: 4.3 MMOL/L (ref 3.5–5.2)
RBC # BLD AUTO: 2.79 10*6/MM3 (ref 4.6–6)
SODIUM BLD-SCNC: 138 MMOL/L (ref 136–145)
VANCOMYCIN TROUGH SERPL-MCNC: 18.2 MCG/ML (ref 5–20)
WBC NRBC COR # BLD: 8.21 10*3/MM3 (ref 4.5–10.7)

## 2018-10-13 PROCEDURE — 25010000002 PIPERACILLIN SOD-TAZOBACTAM PER 1 G: Performed by: INTERNAL MEDICINE

## 2018-10-13 PROCEDURE — 94799 UNLISTED PULMONARY SVC/PX: CPT

## 2018-10-13 PROCEDURE — 80048 BASIC METABOLIC PNL TOTAL CA: CPT | Performed by: INTERNAL MEDICINE

## 2018-10-13 PROCEDURE — 93227 XTRNL ECG REC<48 HR R&I: CPT | Performed by: INTERNAL MEDICINE

## 2018-10-13 PROCEDURE — 63710000001 INSULIN ASPART PER 5 UNITS: Performed by: INTERNAL MEDICINE

## 2018-10-13 PROCEDURE — 82962 GLUCOSE BLOOD TEST: CPT

## 2018-10-13 PROCEDURE — 80202 ASSAY OF VANCOMYCIN: CPT | Performed by: INTERNAL MEDICINE

## 2018-10-13 PROCEDURE — 99231 SBSQ HOSP IP/OBS SF/LOW 25: CPT | Performed by: NURSE PRACTITIONER

## 2018-10-13 PROCEDURE — 25010000002 HYDROMORPHONE 1 MG/ML SOLUTION: Performed by: INTERNAL MEDICINE

## 2018-10-13 PROCEDURE — 85025 COMPLETE CBC W/AUTO DIFF WBC: CPT | Performed by: INTERNAL MEDICINE

## 2018-10-13 RX ADMIN — LEVETIRACETAM 1500 MG: 100 SOLUTION ORAL at 10:25

## 2018-10-13 RX ADMIN — LACOSAMIDE 100 MG: 100 TABLET, FILM COATED ORAL at 19:58

## 2018-10-13 RX ADMIN — LINAGLIPTIN 5 MG: 5 TABLET, FILM COATED ORAL at 08:36

## 2018-10-13 RX ADMIN — DIAZEPAM 2 MG: 2 TABLET ORAL at 03:19

## 2018-10-13 RX ADMIN — LACOSAMIDE 100 MG: 100 TABLET, FILM COATED ORAL at 08:36

## 2018-10-13 RX ADMIN — APIXABAN 5 MG: 5 TABLET, FILM COATED ORAL at 19:59

## 2018-10-13 RX ADMIN — IPRATROPIUM BROMIDE AND ALBUTEROL SULFATE 3 ML: 2.5; .5 SOLUTION RESPIRATORY (INHALATION) at 07:02

## 2018-10-13 RX ADMIN — BUDESONIDE 0.5 MG: 0.5 INHALANT RESPIRATORY (INHALATION) at 20:54

## 2018-10-13 RX ADMIN — LEVETIRACETAM 1500 MG: 100 SOLUTION ORAL at 19:59

## 2018-10-13 RX ADMIN — INSULIN ASPART 2 UNITS: 100 INJECTION, SOLUTION INTRAVENOUS; SUBCUTANEOUS at 20:34

## 2018-10-13 RX ADMIN — IPRATROPIUM BROMIDE AND ALBUTEROL SULFATE 3 ML: 2.5; .5 SOLUTION RESPIRATORY (INHALATION) at 10:30

## 2018-10-13 RX ADMIN — Medication 3 ML: at 09:00

## 2018-10-13 RX ADMIN — MIDODRINE HYDROCHLORIDE 10 MG: 5 TABLET ORAL at 08:36

## 2018-10-13 RX ADMIN — LEVOTHYROXINE SODIUM 75 MCG: 75 TABLET ORAL at 05:24

## 2018-10-13 RX ADMIN — Medication 3 ML: at 20:00

## 2018-10-13 RX ADMIN — IPRATROPIUM BROMIDE AND ALBUTEROL SULFATE 3 ML: 2.5; .5 SOLUTION RESPIRATORY (INHALATION) at 20:54

## 2018-10-13 RX ADMIN — IPRATROPIUM BROMIDE AND ALBUTEROL SULFATE 3 ML: 2.5; .5 SOLUTION RESPIRATORY (INHALATION) at 14:46

## 2018-10-13 RX ADMIN — NYSTATIN: 100000 POWDER TOPICAL at 20:00

## 2018-10-13 RX ADMIN — DIAZEPAM 2 MG: 2 TABLET ORAL at 19:58

## 2018-10-13 RX ADMIN — NYSTATIN: 100000 POWDER TOPICAL at 08:37

## 2018-10-13 RX ADMIN — BUDESONIDE 0.5 MG: 0.5 INHALANT RESPIRATORY (INHALATION) at 07:02

## 2018-10-13 RX ADMIN — HYDROMORPHONE HYDROCHLORIDE 0.25 MG: 1 INJECTION, SOLUTION INTRAMUSCULAR; INTRAVENOUS; SUBCUTANEOUS at 17:11

## 2018-10-13 RX ADMIN — CEFTAZIDIME 2 G: 2 INJECTION, POWDER, FOR SOLUTION INTRAVENOUS at 17:12

## 2018-10-13 RX ADMIN — TAZOBACTAM SODIUM AND PIPERACILLIN SODIUM 4.5 G: 500; 4 INJECTION, SOLUTION INTRAVENOUS at 08:37

## 2018-10-13 RX ADMIN — ACETAMINOPHEN 650 MG: 160 SOLUTION ORAL at 21:34

## 2018-10-13 RX ADMIN — ATORVASTATIN CALCIUM 40 MG: 20 TABLET, FILM COATED ORAL at 08:36

## 2018-10-13 RX ADMIN — DOXAZOSIN 2 MG: 4 TABLET ORAL at 08:36

## 2018-10-13 RX ADMIN — MIDODRINE HYDROCHLORIDE 10 MG: 5 TABLET ORAL at 16:00

## 2018-10-13 RX ADMIN — INSULIN ASPART 2 UNITS: 100 INJECTION, SOLUTION INTRAVENOUS; SUBCUTANEOUS at 17:11

## 2018-10-13 RX ADMIN — APIXABAN 5 MG: 5 TABLET, FILM COATED ORAL at 08:36

## 2018-10-13 RX ADMIN — TAZOBACTAM SODIUM AND PIPERACILLIN SODIUM 4.5 G: 500; 4 INJECTION, SOLUTION INTRAVENOUS at 00:06

## 2018-10-13 NOTE — PLAN OF CARE
Problem: Patient Care Overview  Goal: Plan of Care Review   10/13/18 0352   OTHER   Outcome Summary Pt is on 28% FiO2 with O2 sats in mid to upper 90s. Pt calls out very frequenly for different thing.Extremely needy of attention . NPO with all meds throughout the night. Intermittent tube feedings during the daytime. Valium given and a 1x breathing tx given to patient throughout the night.

## 2018-10-13 NOTE — PROGRESS NOTES
[facility]  Cardiology Progress Note    Patient Identification:  Name: Lam Viramontes  Age: 88 y.o.  Sex: male  :  1930  MRN: [id number]                 Follow Up / Chief Complaint: new 2nd degree AV block    Interval History:  Patient is a 88 y.o. male presents with history of Afib (eliquis), CAD s/p CABG, diastolic dysfunction, h/o transient AV block's, hypertension, COPD, seizures, tracheostomy,  G-tube, hypothyroidism, HLD, and DM who presented to ER due to abdominal pain.       Subjective:   Patient nonverbal but communicates with nodding yes or no.  Continues with some abd pain about same as yesterday.  Denies cp/pressure or SHOB      Objective:    SpO2:  [96 %-100 %] 100 %    Temp:  [97.9 °F (36.6 °C)-98.6 °F (37 °C)] 98.6 °F (37 °C)  Heart Rate:  [79-93] 87  Resp:  [16-18] 16  BP: (127-149)/(66-80) 129/77         Past Medical History:  Past Medical History:   Diagnosis Date   • Ankylosing spondylitis lumbar region (CMS/HCC)    • BPH (benign prostatic hyperplasia)    • C. difficile diarrhea    • Constipation    • Coronary artery disease    • Diabetes mellitus (CMS/HCC)    • Dysphagia    • Hyperlipidemia    • Hypertension    • Hypothyroidism    • MRSA infection    • Neuromuscular dysfunction of bladder    • PAF (paroxysmal atrial fibrillation) (CMS/HCC)    • Pain    • Pressure ulcer of sacral region    • Seizures (CMS/HCC)    • Tracheostomy status (CMS/HCC)      Past Surgical History:  Past Surgical History:   Procedure Laterality Date   • CARDIAC SURGERY     • CORONARY ARTERY BYPASS GRAFT      x 5   • GTUBE INSERTION     • LUMBAR DISCECTOMY FUSION INSTRUMENTATION N/A 2018    Procedure: T12-L2 POSTERIOR ARTHODESIS FUSION WITH MEDTRONIC NAVIGATION;  Surgeon: Jose Daniel Tyler MD;  Location: [facility];  Service: Neurosurgery   • TRACHEOSTOMY          Social History:   Social History   Substance Use Topics   • Smoking status: Former Smoker   • Smokeless tobacco: Never Used   •  Alcohol use No      Family History:  History reviewed. No pertinent family history.       Allergies:  No Known Allergies  Scheduled Meds:    apixaban 5 mg Q12H   atorvastatin 40 mg Daily   budesonide 0.5 mg BID - RT   cefTAZidime 2 g Q8H   doxazosin 2 mg Q24H   insulin aspart 0-7 Units 4x Daily With Meals & Nightly   ipratropium-albuterol 3 mL 4x Daily - RT   lacosamide 100 mg Q12H   levETIRAcetam 1,500 mg Q12H   levothyroxine 75 mcg Q AM   linagliptin 5 mg Daily   midodrine 10 mg TID   nystatin  Q12H   sodium chloride 3 mL Q12H           INTAKE AND OUTPUT:    Intake/Output Summary (Last 24 hours) at 10/13/18 1916  Last data filed at 10/13/18 1753   Gross per 24 hour   Intake             3080 ml   Output             2875 ml   Net              205 ml       Review of Systems:    GI: Denies n/v, complains of abdominal pain  Cardiac:  Denies chest pain/pressure  Pulmonary: denies shortness of breath    Constitutional:  Temp:  [97.9 °F (36.6 °C)-98.6 °F (37 °C)] 98.6 °F (37 °C)  Heart Rate:  [79-93] 87  Resp:  [16-18] 16  BP: (127-149)/(66-80) 129/77   SpO2:  [96 %-100 %] 100 %    Physical Exam:  General:  Appears in no acute distress, resting, nonverbal will nod yes or no   Eyes:  Nonicteric, no conjunctival drainage  HEENT:  No JVD. Thyroid not visibly enlarged. No mucosal pallor or cyanosis  Respiratory: Respirations regular and unlabored at rest. Trach with Trach collar with 02 in place. Rhonchi noted LLL, no rubs or wheezes auscultated,   Cardiovascular: S1S2 Regular rate and rhythm. No murmur, rub or gallop.   DP/PT pulses 2+. No pretibial pitting edema  Gastrointestinal: Abdomen soft, flat, non tender. Bowel sounds present.  No ascites  Musculoskeletal: LUNSFORD x4. No abnormal movements  Extremities: No digital clubbing or cyanosis  Skin:  Skin warm and dry to touch. No rashes  Scabbed lesion to Right shin noted.    Neuro: Awake and alert. Grossly intact  Psych: Mood and affect normal, pleasant and  cooperative    Cardiographics    Tele: Multifocal atrial rhythm  VS Accelerated Junctional           Holter: 10/11/18  Monitor Procedure     Study Description Monitor hooked-up on 10/11/2018 at 15:20 EDT. The monitor was scanned on 10/13/2018. The patient was monitored for 23 hours and 59 minutes. Indications for this exam include av block. Total beats: 06210. Average HR: 68. Min HR: 36. Max HR: 108.      Study Findings Patient diary was not submitted.No symptoms reported during the monitoring period. No complications noted. The predominant rhythm noted during the testing period was sinus rhythm. Premature atrial contractions occured frequently. Consisting primarily of isolated PAC's and atrial bigeminy. Evidence of atrial fibrillation was noted. Atrial fibrillation burden 0.8%. Premature ventricular contractions occured rarely. There were no episodes of ventricular tachycardia. Sinoatrial node conduction was normal. 2nd degree (Mobitz 1) atrioventricular block noted.      Study Impressions An abnormal monitor study.              ECG:     10/12/18            10/11/18               09/23/18         Echocardiogram:    Interpretation Summary 10/12/18    · Left ventricular systolic function is normal. Calculated EF = 65%. Estimated EF was in agreement with the calculated EF. Estimated EF = 65%. Normal left ventricular cavity size noted. All left ventricular wall segments contract normally. Left ventricular wall thickness is consistent with moderate concentric hypertrophy. Left ventricular diastolic function was indeterminate.  · Right ventricular cavity is moderately dilated.  · Right atrial cavity size is mildly dilated.  · The aortic valve is abnormal in structure. There is thickening of the aortic valve. There is nodular echo bright structure on the lung coronary cusp likely sclerosis though cannot rule out fibroelastoma.  · Mild mitral valve regurgitation is present.  · Mild to moderate tricuspid valve regurgitation  is present. Estimated right ventricular systolic pressure from tricuspid regurgitation is moderately elevated (45-55 mmHg). Calculated right ventricular systolic pressure from tricuspid regurgitation is 49 mmHg.         Stress test:  12/29/17     Interpretation Summary      · Equivocal ECG evidence of myocardial ischemia.Negative clinical evidence of myocardial ischemia.  · Left ventricular ejection fraction is normal (Calculated EF = 64%).  · Myocardial perfusion imaging indicates a normal myocardial perfusion study with no evidence of ischemia.  · Impressions are consistent with a low risk study.            Lab Review     Results from last 7 days  Lab Units 10/11/18  1750 10/11/18  1156   TROPONIN T ng/mL <0.010 <0.010       Results from last 7 days  Lab Units 10/10/18  1334   MAGNESIUM mg/dL 2.4       Results from last 7 days  Lab Units 10/13/18  0433   SODIUM mmol/L 138   POTASSIUM mmol/L 4.3   BUN mg/dL 19   CREATININE mg/dL 0.79   CALCIUM mg/dL 8.8     Probnp: 343.6    Results from last 7 days  Lab Units 10/13/18  0433 10/12/18  0619 10/11/18  0759   WBC 10*3/mm3 8.21 7.50 7.68   HEMOGLOBIN g/dL 9.1* 9.5* 9.5*   HEMATOCRIT % 30.6* 32.1* 30.1*   PLATELETS 10*3/mm3 296 313 333             Assessment:    Healthcare associated Pneumonia  Second degree block type 1  Bradycardia  HTN  Hx of Afib  abdomonial pain  HLD  Hx of dysphasia  Seizures  DVT proph  DM  Hypothyrodism  Hx of COPD   accelerated Junctional rhythm     Plan:  -Second degree block type 1-asymptomatic, no chronotropic medications noted on home medications or hosp. Medications, Trop. X's 2 neg., TSH wnl.  Echo  -Bradycardia- asymptomatic, currently 80's  -HTN: well controlled on medications  -hx of afib- Afib burden on holter 0.8 %.   Noted Accelerated Junctional rhythm vs multifocal atrial rhythm.  Is on eliquis for PAF for elevated CHADsVASc.     Pt is asymptomatic with his arrythymias. Rhythm issues likely will improve as he continues to recover.    "Ok to transfer to [place] tomorrow from CV standpoint.  Would recommend avoiding neg chronotropic agents with his documented 2nd degree AVB Type I.  Continue eliquis for  brief PAF noted on holter.         10/13/2018  MARK Be/Transcription:   \"Dictated utilizing Dragon dictation\".       "

## 2018-10-13 NOTE — PLAN OF CARE
Problem: Patient Care Overview  Goal: Plan of Care Review  Outcome: Ongoing (interventions implemented as appropriate)   10/13/18 3500   Coping/Psychosocial   Plan of Care Reviewed With patient   Plan of Care Review   Progress no change   OTHER   Outcome Summary VSS. Bolus tube feeds x 3. Trach care complete, dressings changed. Q2T skin care on buttocks completed. Continue to monitor and assess       Problem: Pneumonia (Adult)  Goal: Signs and Symptoms of Listed Potential Problems Will be Absent, Minimized or Managed (Pneumonia)  Outcome: Ongoing (interventions implemented as appropriate)      Problem: Wound (Includes Pressure Injury) (Adult)  Goal: Signs and Symptoms of Listed Potential Problems Will be Absent, Minimized or Managed (Wound)  Outcome: Ongoing (interventions implemented as appropriate)      Problem: Fall Risk (Adult)  Goal: Absence of Fall  Outcome: Ongoing (interventions implemented as appropriate)      Problem: Pain, Acute (Adult)  Goal: Acceptable Pain Control/Comfort Level  Outcome: Ongoing (interventions implemented as appropriate)

## 2018-10-13 NOTE — PROGRESS NOTES
Continued Stay Note  [facility]     Patient Name: Lam Viramontes  MRN: [id number]  Today's Date: 10/13/2018    Admit Date: 10/10/2018          Discharge Plan     Row Name 10/13/18 1054       Plan    Plan Return to [facility]- skilled level- Ok to send with Peripheral IV for 4 days of IV antibiotic    Plan Comments Incoming call from OKSANA Flynn inquiring if [facility] will accept patient with peripheral IV for 4 days of IV antibiotic instead of having to insert PICC.  Spoke with Zahraa at [facility] at [phone] and she states they will accept patient with peripheral IV for 4 days of IV antibiotics.  Informed OKSANA Flynn that Zahraa with [facility] states they will accept patient with peripheral IV for 4 days of IV antibiotics.... Emely Barrett RN,CCP              Discharge Codes    No documentation.       Expected Discharge Date and Time     Expected Discharge Date Expected Discharge Time    Oct 15, 2018             Emely Barrett RN

## 2018-10-13 NOTE — PROGRESS NOTES
Name: Lam Viramontes ADMIT: 10/10/2018   : 1930  PCP: Suman Jaimes III, MD    MRN: [id number] LOS: 3 days   AGE/SEX: 88 y.o. male  ROOM: [facility]   Subjective   CC: abdominal discomfort  No acute events.  Abdominal pain has improved.  Dyspnea is chronic and stable.  Coughing up a little more.  No f/c/n/v/d.  Tolerating TF.      Objective   Vital Signs  Temp:  [97.9 °F (36.6 °C)-98.2 °F (36.8 °C)] 98 °F (36.7 °C)  Heart Rate:  [] 89  Resp:  [16-20] 16  BP: (127-149)/(66-80) 149/80  SpO2:  [96 %-100 %] 97 %  on  Flow (L/min):  [8] 8;   Device (Oxygen Therapy): T - piece  Body mass index is 21.56 kg/m².    Physical Exam   Constitutional: No distress.   HENT:   Head: Normocephalic and atraumatic.   Mouth/Throat: Oropharynx is clear and moist.   Tracheostomy in place with t-piece   Eyes: Pupils are equal, round, and reactive to light. Conjunctivae and EOM are normal.   Neck: Normal range of motion. Neck supple. No JVD present.   Cardiovascular: Normal rate, regular rhythm and intact distal pulses.    Pulmonary/Chest: Effort normal. He has decreased breath sounds in the right lower field and the left lower field.   Coarse breath sounds   Abdominal: Soft. Bowel sounds are normal. There is no tenderness.   PEG tube in place   Musculoskeletal: He exhibits no edema or tenderness.   Neurological: He is alert.   Skin: Skin is warm and dry. He is not diaphoretic.   Psychiatric: He has a normal mood and affect. His behavior is normal.   Nursing note and vitals reviewed.      Results Review:       I reviewed the patient's new clinical results.    Results from last 7 days  Lab Units 10/13/18  0433 10/12/18  0619 10/11/18  0759 10/10/18  1334   WBC 10*3/mm3 8.21 7.50 7.68 6.71   HEMOGLOBIN g/dL 9.1* 9.5* 9.5* 11.0*   PLATELETS 10*3/mm3 296 313 333 402       Results from last 7 days  Lab Units 10/13/18  0433 10/12/18  0618 10/11/18  0759 10/10/18  1334   SODIUM mmol/L 138 140 137 137   POTASSIUM mmol/L 4.3 4.2 4.1  4.4   CHLORIDE mmol/L 103 104 100 94*   CO2 mmol/L 25.8 27.0 29.3* 34.8*   BUN mg/dL 19 19 20 27*   CREATININE mg/dL 0.79 0.77 0.80 0.85   GLUCOSE mg/dL 92 86 151* 88   Estimated Creatinine Clearance: 59.8 mL/min (by C-G formula based on SCr of 0.79 mg/dL).    Results from last 7 days  Lab Units 10/13/18  0433 10/12/18  0618 10/11/18  0759 10/10/18  1334   CALCIUM mg/dL 8.8 8.8 8.9 9.9   ALBUMIN g/dL  --   --   --  3.40*   MAGNESIUM mg/dL  --   --   --  2.4         apixaban 5 mg Per G Tube Q12H   atorvastatin 40 mg Per G Tube Daily   budesonide 0.5 mg Nebulization BID - RT   cefTAZidime 2 g Intravenous Q8H   doxazosin 2 mg Per G Tube Q24H   insulin aspart 0-7 Units Subcutaneous 4x Daily With Meals & Nightly   ipratropium-albuterol 3 mL Nebulization 4x Daily - RT   lacosamide 100 mg Per G Tube Q12H   levETIRAcetam 1,500 mg Per G Tube Q12H   levothyroxine 75 mcg Per G Tube Q AM   linagliptin 5 mg Per G Tube Daily   midodrine 10 mg Per G Tube TID   nystatin  Topical Q12H   sodium chloride 3 mL Intravenous Q12H       Pharmacy to Dose Zosyn    NPO Diet      Assessment/Plan      Active Hospital Problems    Diagnosis Date Noted   • Healthcare-associated pneumonia [J18.9] 10/10/2018   • Chronic indwelling Chandler catheter [Z92.89] 10/10/2018   • Cystitis [N30.90] 10/10/2018   • Tracheostomy status (CMS/[facility]) [Z93.0] 09/15/2018   • Seizures (CMS/[facility]) [R56.9] 09/15/2018   • PAF (paroxysmal atrial fibrillation) (CMS/[facility]) [I48.0] 09/15/2018   • Neuromuscular dysfunction of bladder [N31.9] 09/15/2018   • Hypothyroidism [E03.9] 09/15/2018   • Diabetes type 2, controlled (CMS/[facility]) [E11.9] 09/15/2018   • Anticoagulated [Z79.01] 09/15/2018   • PEG (percutaneous endoscopic gastrostomy) status (CMS/HCC) [Z93.1] 12/29/2017   • Hypertension [I10] 12/29/2017      Resolved Hospital Problems    Diagnosis Date Noted Date Resolved   No resolved problems to display.   Abdominal Discomfort  - likely related to constipation, no other significant  abdominal findings on CT  - continue bowel regimen  - tolerating TF well    LLL PNA  - MRSA screen negative  - respiratory culture growing pseudomonas  - will switch zosyn to fortaz per sensitivities-will need to discharge on this as we cannot use levaquin due to long QT-CCP s/w [facility] and he can get this through peripheral IV  - pulm toilet, wean oxygen as tolerated-maintaining high 90s sats with 28% O2 through T-piece    Neurogenic Bladder  - has chronic clarke catheter  - no urinary complaints, no urine cx indicated  - urology has seen, appreciate recs    Type 2 DM  - a1c 6.50%  - continue linagliptin   - cover with ssi    Second Degree AV block  - asymptomatic  - 24h holter pending, echo results noted  - cardiology following, appreciate recs    DVT Prophylaxis  - on eliquis for afib    Disposition  [place] tomorrow if okay with cardiology    Adiel Burrell MD  [facility]  10/13/18  11:43 AM

## 2018-10-14 VITALS
RESPIRATION RATE: 16 BRPM | WEIGHT: 146 LBS | SYSTOLIC BLOOD PRESSURE: 138 MMHG | BODY MASS INDEX: 21.62 KG/M2 | TEMPERATURE: 97 F | HEART RATE: 86 BPM | DIASTOLIC BLOOD PRESSURE: 71 MMHG | OXYGEN SATURATION: 98 % | HEIGHT: 69 IN

## 2018-10-14 LAB
ANION GAP SERPL CALCULATED.3IONS-SCNC: 7.9 MMOL/L
BASOPHILS # BLD AUTO: 0.01 10*3/MM3 (ref 0–0.2)
BASOPHILS NFR BLD AUTO: 0.1 % (ref 0–1.5)
BUN BLD-MCNC: 15 MG/DL (ref 8–23)
BUN/CREAT SERPL: 19 (ref 7–25)
CALCIUM SPEC-SCNC: 9.2 MG/DL (ref 8.6–10.5)
CHLORIDE SERPL-SCNC: 104 MMOL/L (ref 98–107)
CO2 SERPL-SCNC: 30.1 MMOL/L (ref 22–29)
CREAT BLD-MCNC: 0.79 MG/DL (ref 0.76–1.27)
DEPRECATED RDW RBC AUTO: 56.1 FL (ref 37–54)
EOSINOPHIL # BLD AUTO: 0.24 10*3/MM3 (ref 0–0.7)
EOSINOPHIL NFR BLD AUTO: 3.6 % (ref 0.3–6.2)
ERYTHROCYTE [DISTWIDTH] IN BLOOD BY AUTOMATED COUNT: 14.6 % (ref 11.5–14.5)
GFR SERPL CREATININE-BSD FRML MDRD: 93 ML/MIN/1.73
GLUCOSE BLD-MCNC: 106 MG/DL (ref 65–99)
GLUCOSE BLDC GLUCOMTR-MCNC: 185 MG/DL (ref 70–130)
HCT VFR BLD AUTO: 31.2 % (ref 40.4–52.2)
HGB BLD-MCNC: 9.7 G/DL (ref 13.7–17.6)
IMM GRANULOCYTES # BLD: 0.01 10*3/MM3 (ref 0–0.03)
IMM GRANULOCYTES NFR BLD: 0.1 % (ref 0–0.5)
LYMPHOCYTES # BLD AUTO: 1.59 10*3/MM3 (ref 0.9–4.8)
LYMPHOCYTES NFR BLD AUTO: 23.5 % (ref 19.6–45.3)
MCH RBC QN AUTO: 33 PG (ref 27–32.7)
MCHC RBC AUTO-ENTMCNC: 31.1 G/DL (ref 32.6–36.4)
MCV RBC AUTO: 106.1 FL (ref 79.8–96.2)
MONOCYTES # BLD AUTO: 0.41 10*3/MM3 (ref 0.2–1.2)
MONOCYTES NFR BLD AUTO: 6.1 % (ref 5–12)
NEUTROPHILS # BLD AUTO: 4.51 10*3/MM3 (ref 1.9–8.1)
NEUTROPHILS NFR BLD AUTO: 66.7 % (ref 42.7–76)
PLATELET # BLD AUTO: 282 10*3/MM3 (ref 140–500)
PMV BLD AUTO: 10 FL (ref 6–12)
POTASSIUM BLD-SCNC: 4.3 MMOL/L (ref 3.5–5.2)
RBC # BLD AUTO: 2.94 10*6/MM3 (ref 4.6–6)
SODIUM BLD-SCNC: 142 MMOL/L (ref 136–145)
WBC NRBC COR # BLD: 6.76 10*3/MM3 (ref 4.5–10.7)

## 2018-10-14 PROCEDURE — 85025 COMPLETE CBC W/AUTO DIFF WBC: CPT | Performed by: INTERNAL MEDICINE

## 2018-10-14 PROCEDURE — 94799 UNLISTED PULMONARY SVC/PX: CPT

## 2018-10-14 PROCEDURE — 25010000002 CEFTAZIDIME PER 500 MG: Performed by: INTERNAL MEDICINE

## 2018-10-14 PROCEDURE — 63710000001 INSULIN ASPART PER 5 UNITS: Performed by: INTERNAL MEDICINE

## 2018-10-14 PROCEDURE — 25010000002 HYDROMORPHONE 1 MG/ML SOLUTION: Performed by: INTERNAL MEDICINE

## 2018-10-14 PROCEDURE — 82962 GLUCOSE BLOOD TEST: CPT

## 2018-10-14 PROCEDURE — 80048 BASIC METABOLIC PNL TOTAL CA: CPT | Performed by: INTERNAL MEDICINE

## 2018-10-14 RX ORDER — IPRATROPIUM BROMIDE AND ALBUTEROL SULFATE 2.5; .5 MG/3ML; MG/3ML
3 SOLUTION RESPIRATORY (INHALATION)
Qty: 360 ML
Start: 2018-10-14

## 2018-10-14 RX ADMIN — IPRATROPIUM BROMIDE AND ALBUTEROL SULFATE 3 ML: 2.5; .5 SOLUTION RESPIRATORY (INHALATION) at 06:58

## 2018-10-14 RX ADMIN — LEVETIRACETAM 1500 MG: 100 SOLUTION ORAL at 09:35

## 2018-10-14 RX ADMIN — NYSTATIN: 100000 POWDER TOPICAL at 09:35

## 2018-10-14 RX ADMIN — DOXAZOSIN 2 MG: 4 TABLET ORAL at 09:37

## 2018-10-14 RX ADMIN — LINAGLIPTIN 5 MG: 5 TABLET, FILM COATED ORAL at 09:35

## 2018-10-14 RX ADMIN — APIXABAN 5 MG: 5 TABLET, FILM COATED ORAL at 09:35

## 2018-10-14 RX ADMIN — HYDROMORPHONE HYDROCHLORIDE 0.25 MG: 1 INJECTION, SOLUTION INTRAMUSCULAR; INTRAVENOUS; SUBCUTANEOUS at 01:30

## 2018-10-14 RX ADMIN — IPRATROPIUM BROMIDE AND ALBUTEROL SULFATE 3 ML: 2.5; .5 SOLUTION RESPIRATORY (INHALATION) at 11:14

## 2018-10-14 RX ADMIN — CEFTAZIDIME 2 G: 2 INJECTION, POWDER, FOR SOLUTION INTRAVENOUS at 13:57

## 2018-10-14 RX ADMIN — CEFTAZIDIME 2 G: 2 INJECTION, POWDER, FOR SOLUTION INTRAVENOUS at 09:36

## 2018-10-14 RX ADMIN — LEVOTHYROXINE SODIUM 75 MCG: 75 TABLET ORAL at 05:07

## 2018-10-14 RX ADMIN — MIDODRINE HYDROCHLORIDE 10 MG: 5 TABLET ORAL at 15:12

## 2018-10-14 RX ADMIN — INSULIN ASPART 2 UNITS: 100 INJECTION, SOLUTION INTRAVENOUS; SUBCUTANEOUS at 12:00

## 2018-10-14 RX ADMIN — Medication 3 ML: at 09:36

## 2018-10-14 RX ADMIN — BUDESONIDE 0.5 MG: 0.5 INHALANT RESPIRATORY (INHALATION) at 06:58

## 2018-10-14 RX ADMIN — ATORVASTATIN CALCIUM 40 MG: 20 TABLET, FILM COATED ORAL at 09:35

## 2018-10-14 RX ADMIN — CEFTAZIDIME 2 G: 2 INJECTION, POWDER, FOR SOLUTION INTRAVENOUS at 01:00

## 2018-10-14 RX ADMIN — LACOSAMIDE 100 MG: 100 TABLET, FILM COATED ORAL at 09:35

## 2018-10-14 RX ADMIN — HYDROMORPHONE HYDROCHLORIDE 0.25 MG: 1 INJECTION, SOLUTION INTRAMUSCULAR; INTRAVENOUS; SUBCUTANEOUS at 10:23

## 2018-10-14 RX ADMIN — HYDROMORPHONE HYDROCHLORIDE 0.25 MG: 1 INJECTION, SOLUTION INTRAMUSCULAR; INTRAVENOUS; SUBCUTANEOUS at 15:12

## 2018-10-14 RX ADMIN — ACETAMINOPHEN 650 MG: 160 SOLUTION ORAL at 05:07

## 2018-10-14 RX ADMIN — MIDODRINE HYDROCHLORIDE 10 MG: 5 TABLET ORAL at 09:36

## 2018-10-14 RX ADMIN — IPRATROPIUM BROMIDE AND ALBUTEROL SULFATE 3 ML: 2.5; .5 SOLUTION RESPIRATORY (INHALATION) at 15:33

## 2018-10-14 NOTE — PROGRESS NOTES
Continued Stay Note  [facility]     Patient Name: Lam Viramontes  MRN: [id number]  Today's Date: 10/14/2018    Admit Date: 10/10/2018          Discharge Plan     Row Name 10/14/18 1226       Plan    Plan Return to [facility] skilled care    Patient/Family in Agreement with Plan yes    Plan Comments Received CCP page from staff RN Rina who states pt is to be DC'd today and previous note said for RN to contact CCP re: DC. [facility]. [phone] (Report [phone] and fax [phone]). Called and updated staff RN. CCP to follow...........JW              Discharge Codes    No documentation.       Expected Discharge Date and Time     Expected Discharge Date Expected Discharge Time    Oct 15, 2018             Barbara Barry, RN

## 2018-10-14 NOTE — PLAN OF CARE
Problem: Patient Care Overview  Goal: Plan of Care Review  Outcome: Ongoing (interventions implemented as appropriate)   10/14/18 1430   Coping/Psychosocial   Plan of Care Reviewed With patient   Plan of Care Review   Progress no change   OTHER   Outcome Summary VSS. Patient will be d/c to [facility]. He is being d/c with peripheral IV per Dr. Burrell. Awaiting ambulance.       Problem: Pneumonia (Adult)  Goal: Signs and Symptoms of Listed Potential Problems Will be Absent, Minimized or Managed (Pneumonia)  Outcome: Ongoing (interventions implemented as appropriate)      Problem: Wound (Includes Pressure Injury) (Adult)  Goal: Signs and Symptoms of Listed Potential Problems Will be Absent, Minimized or Managed (Wound)  Outcome: Ongoing (interventions implemented as appropriate)      Problem: Fall Risk (Adult)  Goal: Absence of Fall  Outcome: Ongoing (interventions implemented as appropriate)      Problem: Pain, Acute (Adult)  Goal: Acceptable Pain Control/Comfort Level  Outcome: Ongoing (interventions implemented as appropriate)

## 2018-10-14 NOTE — DISCHARGE SUMMARY
Date of Admission: 10/10/2018  Date of Discharge:  10/14/2018  Primary Care Physician: Suman Jaimes III, MD     Discharge Diagnosis:  Active Hospital Problems    Diagnosis Date Noted   • Healthcare-associated pneumonia [J18.9] 10/10/2018   • Chronic indwelling Chandler catheter [Z92.89] 10/10/2018   • Cystitis [N30.90] 10/10/2018   • Tracheostomy status (CMS/formerly [facility]) [Z93.0] 09/15/2018   • Seizures (CMS/formerly [facility]) [R56.9] 09/15/2018   • PAF (paroxysmal atrial fibrillation) (CMS/HCC) [I48.0] 09/15/2018   • Neuromuscular dysfunction of bladder [N31.9] 09/15/2018   • Hypothyroidism [E03.9] 09/15/2018   • Diabetes type 2, controlled (CMS/formerly [facility]) [E11.9] 09/15/2018   • Anticoagulated [Z79.01] 09/15/2018   • PEG (percutaneous endoscopic gastrostomy) status (CMS/formerly [facility]) [Z93.1] 12/29/2017   • Hypertension [I10] 12/29/2017      Resolved Hospital Problems    Diagnosis Date Noted Date Resolved   No resolved problems to display.       Presenting Problem/History of Present Illness:  Cystitis [N30.90]  Generalized abdominal pain [R10.84]  Healthcare-associated pneumonia [J18.9]     Hospital Course:  The patient is a 88 y.o. male with a history of atrial fibrillation on eliquis, diabetes, tracheostomy, chornic Chandler, G-tube who presented with left-sided abdominal pain and cough.  Please see admission H&P from 10/10/18 for further details.  His abdominal CT scan was negative and showed constipation, and his pain was relieved with resolution of this.  He tolerated tube feeds well.  He was found to have a left lower lobe pneumonia, and was started on vancomycin and zosyn.  MRSA screen was negative, and sputum cultures grew out pseudomonas.  His antibiotics were switched to ceftazidime for the remainder of a 7d course which he will complete as an outpatient. He will be discharged with a peripheral IV for this, and it was confirmed with [place] that this would be acceptable.  Levofloxacin was avoided due to a long QTc.  He will need ongoing  "pulmonary toilet.  He was stable on 28% O2 via t-piece.  The patient was noted to have second-degree AV block and cardiology was consulted.  He was asymptomatic and his heart rates remained stable with occasional bradycardia to the 40s, mostly at night.  Troponins were negative, TSH was normal, and there were no changes in function on the echocardiogram.  Avoidance of chronotropic agents is recommended for the time being.      Exam Today:  Blood pressure 130/82, pulse 86, temperature 98.7 °F (37.1 °C), temperature source Oral, resp. rate 16, height 175.3 cm (69\"), weight 66.2 kg (146 lb), SpO2 98 %.  Constitutional: No distress.   Head: Normocephalic and atraumatic.   Mouth/Throat: Oropharynx is clear and moist.   Tracheostomy in place with t-piece   Eyes: Pupils are equal, round, and reactive to light. Conjunctivae and EOM are normal.   Neck: Normal range of motion. Neck supple. No JVD present.   Cardiovascular: Normal rate, regular rhythm and intact distal pulses.    Pulmonary/Chest: Effort normal. He has decreased bibasilar breath sounds  Coarse breath sounds improving  Abdominal: Soft. Bowel sounds are normal. There is no tenderness.   PEG tube in place   Musculoskeletal: He exhibits no edema or tenderness.   Neurological: He is alert.   Skin: Skin is warm and dry. He is not diaphoretic.   Psychiatric: He has a normal mood and affect. His behavior is normal.   Nursing note and vitals reviewed.    Consults:   Consults     Date and Time Order Name Status Description    10/11/2018 0232 Inpatient Cardiology Consult Completed     10/10/2018 1714 Inpatient Urology Consult      10/10/2018 1714 Inpatient Neurosurgery Consult Completed     10/10/2018 1455 [facility] (on-call MD unless specified) Completed     9/21/2018 1522 Inpatient Urology Consult Completed     9/15/2018 0953 Inpatient Cardiology Consult Completed     9/15/2018 0953 Inpatient Infectious Diseases Consult Completed     9/15/2018 0953 Inpatient Pulmonology " Consult Completed     9/15/2018 0158 Inpatient Pulmonology Consult Completed     9/14/2018 2119 [facility] (on-call MD unless specified) Completed            Discharge Disposition:  Skilled Nursing Facility (DC - External)    Discharge Medications:     Discharge Medications      New Medications      Instructions Start Date   ceftazidime  Commonly known as:  FORTAZ   2 g, Intravenous, Every 8 Hours      ipratropium-albuterol 0.5-2.5 mg/3 ml nebulizer  Commonly known as:  DUO-NEB   3 mL, Nebulization, 4 Times Daily - RT      polyethylene glycol pack packet  Commonly known as:  MIRALAX   17 g, Oral, Daily PRN         Changes to Medications      Instructions Start Date   albuterol (5 MG/ML) 0.5% nebulizer solution  Commonly known as:  PROVENTIL  What changed:  Another medication with the same name was removed. Continue taking this medication, and follow the directions you see here.   2.5 mg, Nebulization, Every 4 Hours PRN         Continue These Medications      Instructions Start Date   acetaminophen 160 MG/5ML solution  Commonly known as:  TYLENOL   650 mg, Per G Tube, Every 4 Hours PRN      apixaban 5 MG tablet tablet  Commonly known as:  ELIQUIS   5 mg, Per G Tube, 2 Times Daily      atorvastatin 40 MG tablet  Commonly known as:  LIPITOR   40 mg, Per G Tube, Daily      budesonide 0.5 MG/2ML nebulizer solution  Commonly known as:  PULMICORT   0.5 mg, Nebulization, 2 Times Daily - RT      docusate sodium 100 MG capsule   100 mg, Oral, 2 Times Daily PRN      finasteride 5 MG tablet  Commonly known as:  PROSCAR   5 mg, Per G Tube, Daily      JANUMET  MG per tablet  Generic drug:  sitaGLIPtin-metFORMIN   1 tablet, Per G Tube, 2 Times Daily With Meals      lacosamide 50 MG tablet tablet  Commonly known as:  VIMPAT   100 mg, Per G Tube, Every 12 Hours Scheduled      levETIRAcetam 750 MG tablet  Commonly known as:  KEPPRA   1,500 mg, Per G Tube, 2 Times Daily      levothyroxine 75 MCG tablet  Commonly known as:  SYNTHROID,  LEVOTHROID   75 mcg, Per G Tube, Daily      midodrine 10 MG tablet  Commonly known as:  PROAMATINE   10 mg, Per G Tube, 3 Times Daily         Stop These Medications    tiotropium 18 MCG per inhalation capsule  Commonly known as:  SPIRIVA            Discharge Diet:   Diet Instructions     Diet: Nothing By Mouth, Tube Feeding; Bolus; Resume pervious schedule       Discharge Diet:   Nothing By Mouth  Tube Feeding       Feeding Type:  Bolus    Formula, Amount & Frequency:  Resume pervious schedule          Activity at Discharge:   Activity Instructions     Activity as Tolerated             Follow-up Appointments:  No future appointments.  Additional Instructions for the Follow-ups that You Need to Schedule     Discharge Follow-up with Specialty: House physician or Medical Director at [facility]    As directed      Specialty:  House physician or Medical Director at [facility]    Follow Up Details:  1-3d               Test Results Pending at Discharge:   Order Current Status    Blood Culture - Blood, Preliminary result    Blood Culture - Blood, Preliminary result           Adiel Burrell MD  10/14/18  1:24 PM    Time Spent on Discharge Activities: Greater than 30 minutes.

## 2018-10-15 ENCOUNTER — APPOINTMENT (OUTPATIENT)
Dept: CT IMAGING | Facility: HOSPITAL | Age: 83
End: 2018-10-15

## 2018-10-15 ENCOUNTER — APPOINTMENT (OUTPATIENT)
Dept: GENERAL RADIOLOGY | Facility: HOSPITAL | Age: 83
End: 2018-10-15

## 2018-10-15 ENCOUNTER — HOSPITAL ENCOUNTER (EMERGENCY)
Facility: HOSPITAL | Age: 83
Discharge: HOME | End: 2018-10-16
Attending: EMERGENCY MEDICINE | Admitting: EMERGENCY MEDICINE

## 2018-10-15 DIAGNOSIS — M45.6 ANKYLOSING SPONDYLITIS OF LUMBAR REGION (HCC): ICD-10-CM

## 2018-10-15 DIAGNOSIS — R10.9 LEFT SIDED ABDOMINAL PAIN: Primary | ICD-10-CM

## 2018-10-15 LAB
ALBUMIN SERPL-MCNC: 3.1 G/DL (ref 3.5–5.2)
ALBUMIN/GLOB SERPL: 0.7 G/DL
ALP SERPL-CCNC: 121 U/L (ref 39–117)
ALT SERPL W P-5'-P-CCNC: 19 U/L (ref 1–41)
ANION GAP SERPL CALCULATED.3IONS-SCNC: 9.5 MMOL/L
AST SERPL-CCNC: 23 U/L (ref 1–40)
BACTERIA SPEC AEROBE CULT: NORMAL
BACTERIA SPEC AEROBE CULT: NORMAL
BACTERIA UR QL AUTO: ABNORMAL /HPF
BASOPHILS # BLD AUTO: 0.01 10*3/MM3 (ref 0–0.2)
BASOPHILS NFR BLD AUTO: 0.2 % (ref 0–1.5)
BILIRUB SERPL-MCNC: 0.3 MG/DL (ref 0.1–1.2)
BILIRUB UR QL STRIP: NEGATIVE
BUN BLD-MCNC: 11 MG/DL (ref 8–23)
BUN/CREAT SERPL: 15.9 (ref 7–25)
CALCIUM SPEC-SCNC: 9.4 MG/DL (ref 8.6–10.5)
CHLORIDE SERPL-SCNC: 98 MMOL/L (ref 98–107)
CLARITY UR: CLEAR
CO2 SERPL-SCNC: 32.5 MMOL/L (ref 22–29)
COLOR UR: YELLOW
CREAT BLD-MCNC: 0.69 MG/DL (ref 0.76–1.27)
D-LACTATE SERPL-SCNC: 0.7 MMOL/L (ref 0.5–2)
DEPRECATED RDW RBC AUTO: 55.3 FL (ref 37–54)
EOSINOPHIL # BLD AUTO: 0.13 10*3/MM3 (ref 0–0.7)
EOSINOPHIL NFR BLD AUTO: 2.1 % (ref 0.3–6.2)
ERYTHROCYTE [DISTWIDTH] IN BLOOD BY AUTOMATED COUNT: 14.4 % (ref 11.5–14.5)
GFR SERPL CREATININE-BSD FRML MDRD: 108 ML/MIN/1.73
GLOBULIN UR ELPH-MCNC: 4.3 GM/DL
GLUCOSE BLD-MCNC: 122 MG/DL (ref 65–99)
GLUCOSE UR STRIP-MCNC: NEGATIVE MG/DL
HCT VFR BLD AUTO: 33.3 % (ref 40.4–52.2)
HGB BLD-MCNC: 10 G/DL (ref 13.7–17.6)
HGB UR QL STRIP.AUTO: ABNORMAL
HYALINE CASTS UR QL AUTO: ABNORMAL /LPF
IMM GRANULOCYTES # BLD: 0 10*3/MM3 (ref 0–0.03)
IMM GRANULOCYTES NFR BLD: 0 % (ref 0–0.5)
INR PPP: 1.14 (ref 0.9–1.1)
KETONES UR QL STRIP: ABNORMAL
LEUKOCYTE ESTERASE UR QL STRIP.AUTO: NEGATIVE
LIPASE SERPL-CCNC: 31 U/L (ref 13–60)
LYMPHOCYTES # BLD AUTO: 1.01 10*3/MM3 (ref 0.9–4.8)
LYMPHOCYTES NFR BLD AUTO: 16.5 % (ref 19.6–45.3)
MCH RBC QN AUTO: 32.1 PG (ref 27–32.7)
MCHC RBC AUTO-ENTMCNC: 30 G/DL (ref 32.6–36.4)
MCV RBC AUTO: 106.7 FL (ref 79.8–96.2)
MONOCYTES # BLD AUTO: 0.45 10*3/MM3 (ref 0.2–1.2)
MONOCYTES NFR BLD AUTO: 7.4 % (ref 5–12)
NEUTROPHILS # BLD AUTO: 4.51 10*3/MM3 (ref 1.9–8.1)
NEUTROPHILS NFR BLD AUTO: 73.8 % (ref 42.7–76)
NITRITE UR QL STRIP: NEGATIVE
PH UR STRIP.AUTO: 8 [PH] (ref 5–8)
PLATELET # BLD AUTO: 289 10*3/MM3 (ref 140–500)
PMV BLD AUTO: 10.4 FL (ref 6–12)
POTASSIUM BLD-SCNC: 4.1 MMOL/L (ref 3.5–5.2)
PROT SERPL-MCNC: 7.4 G/DL (ref 6–8.5)
PROT UR QL STRIP: ABNORMAL
PROTHROMBIN TIME: 14.4 SECONDS (ref 11.7–14.2)
RBC # BLD AUTO: 3.12 10*6/MM3 (ref 4.6–6)
RBC # UR: ABNORMAL /HPF
REF LAB TEST METHOD: ABNORMAL
SODIUM BLD-SCNC: 140 MMOL/L (ref 136–145)
SP GR UR STRIP: 1.02 (ref 1–1.03)
SQUAMOUS #/AREA URNS HPF: ABNORMAL /HPF
TROPONIN T SERPL-MCNC: 0.02 NG/ML (ref 0–0.03)
UROBILINOGEN UR QL STRIP: ABNORMAL
WBC NRBC COR # BLD: 6.11 10*3/MM3 (ref 4.5–10.7)
WBC UR QL AUTO: ABNORMAL /HPF

## 2018-10-15 PROCEDURE — 96361 HYDRATE IV INFUSION ADD-ON: CPT

## 2018-10-15 PROCEDURE — 85025 COMPLETE CBC W/AUTO DIFF WBC: CPT | Performed by: EMERGENCY MEDICINE

## 2018-10-15 PROCEDURE — 94799 UNLISTED PULMONARY SVC/PX: CPT

## 2018-10-15 PROCEDURE — 81001 URINALYSIS AUTO W/SCOPE: CPT | Performed by: EMERGENCY MEDICINE

## 2018-10-15 PROCEDURE — 96375 TX/PRO/DX INJ NEW DRUG ADDON: CPT

## 2018-10-15 PROCEDURE — 25010000002 MORPHINE PER 10 MG: Performed by: EMERGENCY MEDICINE

## 2018-10-15 PROCEDURE — 71045 X-RAY EXAM CHEST 1 VIEW: CPT

## 2018-10-15 PROCEDURE — P9612 CATHETERIZE FOR URINE SPEC: HCPCS

## 2018-10-15 PROCEDURE — 87040 BLOOD CULTURE FOR BACTERIA: CPT | Performed by: EMERGENCY MEDICINE

## 2018-10-15 PROCEDURE — 25010000002 IOPAMIDOL 61 % SOLUTION: Performed by: EMERGENCY MEDICINE

## 2018-10-15 PROCEDURE — 83690 ASSAY OF LIPASE: CPT | Performed by: EMERGENCY MEDICINE

## 2018-10-15 PROCEDURE — 80053 COMPREHEN METABOLIC PANEL: CPT | Performed by: EMERGENCY MEDICINE

## 2018-10-15 PROCEDURE — 96374 THER/PROPH/DIAG INJ IV PUSH: CPT

## 2018-10-15 PROCEDURE — 74177 CT ABD & PELVIS W/CONTRAST: CPT

## 2018-10-15 PROCEDURE — 25010000002 ONDANSETRON PER 1 MG: Performed by: EMERGENCY MEDICINE

## 2018-10-15 PROCEDURE — 83605 ASSAY OF LACTIC ACID: CPT | Performed by: EMERGENCY MEDICINE

## 2018-10-15 PROCEDURE — 93010 ELECTROCARDIOGRAM REPORT: CPT | Performed by: INTERNAL MEDICINE

## 2018-10-15 PROCEDURE — 93005 ELECTROCARDIOGRAM TRACING: CPT | Performed by: EMERGENCY MEDICINE

## 2018-10-15 PROCEDURE — 85610 PROTHROMBIN TIME: CPT | Performed by: EMERGENCY MEDICINE

## 2018-10-15 PROCEDURE — 84484 ASSAY OF TROPONIN QUANT: CPT | Performed by: EMERGENCY MEDICINE

## 2018-10-15 PROCEDURE — 99285 EMERGENCY DEPT VISIT HI MDM: CPT

## 2018-10-15 RX ORDER — SODIUM PHOSPHATE, DIBASIC AND SODIUM PHOSPHATE, MONOBASIC 7; 19 G/133ML; G/133ML
1 ENEMA RECTAL DAILY PRN
COMMUNITY

## 2018-10-15 RX ORDER — ONDANSETRON 2 MG/ML
4 INJECTION INTRAMUSCULAR; INTRAVENOUS ONCE
Status: COMPLETED | OUTPATIENT
Start: 2018-10-15 | End: 2018-10-15

## 2018-10-15 RX ORDER — HYDROCODONE BITARTRATE AND ACETAMINOPHEN 5; 325 MG/1; MG/1
1 TABLET ORAL EVERY 8 HOURS PRN
Qty: 15 TABLET | Refills: 0 | Status: SHIPPED | OUTPATIENT
Start: 2018-10-15

## 2018-10-15 RX ORDER — SODIUM CHLORIDE 0.9 % (FLUSH) 0.9 %
10 SYRINGE (ML) INJECTION AS NEEDED
Status: DISCONTINUED | OUTPATIENT
Start: 2018-10-15 | End: 2018-10-16 | Stop reason: HOSPADM

## 2018-10-15 RX ORDER — BISACODYL 10 MG
10 SUPPOSITORY, RECTAL RECTAL DAILY PRN
COMMUNITY

## 2018-10-15 RX ADMIN — SODIUM CHLORIDE 1000 ML: 9 INJECTION, SOLUTION INTRAVENOUS at 18:35

## 2018-10-15 RX ADMIN — IOPAMIDOL 85 ML: 612 INJECTION, SOLUTION INTRAVENOUS at 20:44

## 2018-10-15 RX ADMIN — ONDANSETRON 4 MG: 2 INJECTION INTRAMUSCULAR; INTRAVENOUS at 18:29

## 2018-10-15 RX ADMIN — MORPHINE SULFATE 4 MG: 4 INJECTION INTRAVENOUS at 18:30

## 2018-10-15 NOTE — ED NOTES
Pt arrives with c/o LLQ pain since yesterday. Pt was here yesterday for the same and discharged. Pt is from [place].      Cat Rollins, RN  10/15/18 7874

## 2018-10-15 NOTE — ED PROVIDER NOTES
EMERGENCY DEPARTMENT ENCOUNTER    CHIEF COMPLAINT  Chief Complaint: LUQ abd   History given by: Pt and family   History limited by: Due to Trach placed, cannot speak   Room Number: 14/14  PMD: Suman Jaimes III, MD      HPI:  Pt is a 88 y.o. male who presents complaining of LUQ abd pain that began earlier this morning. Pt's last BM was today. Pt confirms diarrhea. Pt denies N/V, fever, dysuria. Pt has catheter placed. Pt states pain was 8/10 earlier today. Pt has had previous fall which caused fracture in vertebrate.          Duration:  Earlier this morning couple hours ago   Onset: sudden   Timing: constant  Location: LUQ  Radiation: none  Quality: pain  Intensity/Severity: moderate  Progression: unchanged  Associated Symptoms: diarrhea  Aggravating Factors: none  Alleviating Factors: none  Previous Episodes: none stated  Treatment before arrival: none stated     PAST MEDICAL HISTORY  Active Ambulatory Problems     Diagnosis Date Noted   • Chest pain (non-cardiac) 12/29/2017   • Atrial fibrillation (CMS/[facility]) 12/29/2017   • C. difficile diarrhea 12/29/2017   • Coronary artery disease 12/29/2017   • Diabetes mellitus (CMS/[facility]) 12/29/2017   • Disease of thyroid gland 12/29/2017   • Hypertension 12/29/2017   • Pressure ulcer of sacral region 12/29/2017   • Ankylosing spondylitis lumbar region (CMS/[facility]) 12/29/2017   • Neurogenic bladder due to ankylosing spondylitis 12/29/2017   • Tracheostomy in place (CMS/[facility]) 12/29/2017   • PEG (percutaneous endoscopic gastrostomy) status (CMS/[facility]) 12/29/2017   • Acute UTI 01/19/2018   • Sepsis (CMS/[facility]) 01/19/2018   • Clostridium difficile colitis 01/22/2018   • Anticoagulated 01/31/2018   • Closed compression fracture of L1 lumbar vertebra (CMS/[facility]) 09/14/2018   • Tracheostomy status (CMS/[facility]) 09/15/2018   • Seizures (CMS/[facility]) 09/15/2018   • PAF (paroxysmal atrial fibrillation) (CMS/[facility]) 09/15/2018   • Neuromuscular dysfunction of bladder 09/15/2018   • Hypothyroidism 09/15/2018    • Gastrostomy tube dependent (CMS/[facility]) 09/15/2018   • COPD (chronic obstructive pulmonary disease) (CMS/[facility]) 09/15/2018   • CAD (coronary artery disease) 09/15/2018   • AV block, 1st degree 09/15/2018   • Bifascicular block 09/15/2018   • Macrocytic anemia 09/15/2018   • Diabetes type 2, controlled (CMS/[facility]) 09/15/2018   • History of Clostridium difficile colitis 09/15/2018   • Anticoagulated 09/15/2018   • Healthcare-associated pneumonia 10/10/2018   • Chronic indwelling Chandler catheter 10/10/2018   • Cystitis 10/10/2018     Resolved Ambulatory Problems     Diagnosis Date Noted   • Hyperkalemia 09/15/2018     Past Medical History:   Diagnosis Date   • Ankylosing spondylitis lumbar region (CMS/[facility])    • BPH (benign prostatic hyperplasia)    • C. difficile diarrhea    • Constipation    • Coronary artery disease    • Diabetes mellitus (CMS/[facility])    • Dysphagia    • Hyperlipidemia    • Hypertension    • Hypothyroidism    • MRSA infection    • Neuromuscular dysfunction of bladder    • PAF (paroxysmal atrial fibrillation) (CMS/HCC)    • Pain    • Pressure ulcer of sacral region    • Seizures (CMS/HCC)    • Tracheostomy status (CMS/HCC)        PAST SURGICAL HISTORY  Past Surgical History:   Procedure Laterality Date   • CARDIAC SURGERY     • CORONARY ARTERY BYPASS GRAFT      x 5   • GTUBE INSERTION     • LUMBAR DISCECTOMY FUSION INSTRUMENTATION N/A 9/17/2018    Procedure: T12-L2 POSTERIOR ARTHODESIS FUSION WITH [employer] NAVIGATION;  Surgeon: Jose Daniel Tyler MD;  Location: [facility];  Service: Neurosurgery   • TRACHEOSTOMY         FAMILY HISTORY  History reviewed. No pertinent family history.    SOCIAL HISTORY  Social History     Social History   • Marital status:      Spouse name: N/A   • Number of children: N/A   • Years of education: N/A     Occupational History   • Not on file.     Social History Main Topics   • Smoking status: Former Smoker   • Smokeless tobacco: Never Used   • Alcohol use No   •  Drug use: No   • Sexual activity: Defer     Other Topics Concern   • Not on file     Social History Narrative   • No narrative on file       ALLERGIES  Patient has no known allergies.    REVIEW OF SYSTEMS  Review of Systems   Unable to perform ROS: Patient nonverbal   Constitutional: Negative for fever.   Gastrointestinal: Positive for abdominal pain (LUQ ) and diarrhea. Negative for nausea and vomiting.   Genitourinary: Negative for dysuria.       PHYSICAL EXAM  ED Triage Vitals   Temp Heart Rate Resp BP SpO2   10/15/18 1656 10/15/18 1655 10/15/18 1655 10/15/18 1655 10/15/18 1655   98.7 °F (37.1 °C) 108 18 170/95 100 %      Temp src Heart Rate Source Patient Position BP Location FiO2 (%)   10/15/18 1656 -- -- -- --   Tympanic           Physical Exam   Constitutional: He is oriented to person, place, and time. No distress.   HENT:   Head: Normocephalic and atraumatic.   Mouth/Throat: Oropharynx is clear and moist.   Eyes: Pupils are equal, round, and reactive to light. EOM are normal.   Neck: Normal range of motion. Neck supple.   Trach in place, clean, dry, and intact.    Cardiovascular: Regular rhythm and normal heart sounds.  Tachycardia present.    No murmur heard.  Pulmonary/Chest: Effort normal. No respiratory distress. He has decreased breath sounds (but lungs are clear). He exhibits no tenderness.   Abdominal: Soft. Bowel sounds are normal. There is tenderness (LUQ and LLQ). There is no rebound and no guarding.   G-tube in LUQ.   Genitourinary:   Genitourinary Comments: Chandler cath in place with clear urine.    Musculoskeletal: Normal range of motion. He exhibits no edema.   Neurological: He is alert and oriented to person, place, and time. He has normal sensation and normal strength.   Skin: Skin is warm and dry.   Psychiatric: Mood and affect normal.   Nursing note and vitals reviewed.      LAB RESULTS  Lab Results (last 24 hours)     Procedure Component Value Units Date/Time    CBC & Differential  [[id number]] Collected:  10/15/18 1827    Specimen:  Blood Updated:  10/15/18 1900    Narrative:       The following orders were created for panel order CBC & Differential.  Procedure                               Abnormality         Status                     ---------                               -----------         ------                     Scan Slide[[id number]]                                                                  CBC Auto Differential[[id number]]        Abnormal            Final result                 Please view results for these tests on the individual orders.    Comprehensive Metabolic Panel [[id number]]  (Abnormal) Collected:  10/15/18 1827    Specimen:  Blood Updated:  10/15/18 1917     Glucose 122 (H) mg/dL      BUN 11 mg/dL      Creatinine 0.69 (L) mg/dL      Sodium 140 mmol/L      Potassium 4.1 mmol/L      Chloride 98 mmol/L      CO2 32.5 (H) mmol/L      Calcium 9.4 mg/dL      Total Protein 7.4 g/dL      Albumin 3.10 (L) g/dL      ALT (SGPT) 19 U/L      AST (SGOT) 23 U/L      Alkaline Phosphatase 121 (H) U/L      Total Bilirubin 0.3 mg/dL      eGFR Non African Amer 108 mL/min/1.73      Globulin 4.3 gm/dL      A/G Ratio 0.7 g/dL      BUN/Creatinine Ratio 15.9     Anion Gap 9.5 mmol/L     Narrative:       The MDRD GFR formula is only valid for adults with stable renal function between ages 18 and 70.    Protime-INR [[id number]]  (Abnormal) Collected:  10/15/18 1827    Specimen:  Blood Updated:  10/15/18 1912     Protime 14.4 (H) Seconds      INR 1.14 (H)    Troponin [[id number]]  (Normal) Collected:  10/15/18 1827    Specimen:  Blood Updated:  10/15/18 1917     Troponin T 0.016 ng/mL     Narrative:       Troponin T Reference Ranges:  Less than 0.03 ng/mL:    Negative for AMI  0.03 to 0.09 ng/mL:      Indeterminant for AMI  Greater than 0.09 ng/mL: Positive for AMI    Lipase [[id number]]  (Normal) Collected:  10/15/18 1827    Specimen:  Blood Updated:  10/15/18 1917     Lipase 31 U/L     Blood  Culture - Blood, [[id number]] Collected:  10/15/18 1827    Specimen:  Blood from Arm, Left Updated:  10/15/18 1850    Lactic Acid, Plasma [[id number]]  (Normal) Collected:  10/15/18 1827    Specimen:  Blood Updated:  10/15/18 1903     Lactate 0.7 mmol/L     CBC Auto Differential [[id number]]  (Abnormal) Collected:  10/15/18 1827    Specimen:  Blood Updated:  10/15/18 1900     WBC 6.11 10*3/mm3      RBC 3.12 (L) 10*6/mm3      Hemoglobin 10.0 (L) g/dL      Hematocrit 33.3 (L) %      .7 (H) fL      MCH 32.1 pg      MCHC 30.0 (L) g/dL      RDW 14.4 %      RDW-SD 55.3 (H) fl      MPV 10.4 fL      Platelets 289 10*3/mm3      Neutrophil % 73.8 %      Lymphocyte % 16.5 (L) %      Monocyte % 7.4 %      Eosinophil % 2.1 %      Basophil % 0.2 %      Immature Grans % 0.0 %      Neutrophils, Absolute 4.51 10*3/mm3      Lymphocytes, Absolute 1.01 10*3/mm3      Monocytes, Absolute 0.45 10*3/mm3      Eosinophils, Absolute 0.13 10*3/mm3      Basophils, Absolute 0.01 10*3/mm3      Immature Grans, Absolute 0.00 10*3/mm3     Urinalysis With Microscopic If Indicated (No Culture) - Urine, Catheter [[id number]]  (Abnormal) Collected:  10/15/18 1845    Specimen:  Urine from Urine, Catheter Updated:  10/15/18 1906     Color, UA Yellow     Appearance, UA Clear     pH, UA 8.0     Specific Gravity, UA 1.017     Glucose, UA Negative     Ketones, UA Trace (A)     Bilirubin, UA Negative     Blood, UA Trace (A)     Protein, UA >=300 mg/dL (3+) (A)     Leuk Esterase, UA Negative     Nitrite, UA Negative     Urobilinogen, UA 0.2 E.U./dL    Urinalysis, Microscopic Only - Urine, Clean Catch [[id number]]  (Abnormal) Collected:  10/15/18 1845    Specimen:  Urine from Urine, Catheter Updated:  10/15/18 1906     RBC, UA 3-5 (A) /HPF      WBC, UA 0-2 /HPF      Bacteria, UA None Seen /HPF      Squamous Epithelial Cells, UA 0-2 /HPF      Hyaline Casts, UA 0-2 /LPF      Methodology Automated Microscopy    Blood Culture - Blood, [[id number]] Collected:   10/15/18 2028    Specimen:  Blood from Arm, Left Updated:  10/15/18 2030          I ordered the above labs and reviewed the results    RADIOLOGY  CT Abdomen Pelvis With Contrast   Preliminary Result   1. Small right pleural effusion with bibasilar atelectasis or   inflammatory change.   2. Cholelithiasis.   3. Mild colonic diverticulosis. There is no definite evidence of   diverticulitis.       Radiation dose reduction techniques were utilized, including automated   exposure control and exposure modulation based on body size.              XR Chest 1 View   Preliminary Result   No significant change since previous study of 10/11/2018.               I ordered the above noted radiological studies. Interpreted by radiologist. Reviewed by me in PACS.       PROCEDURES  Procedures    EKG          EKG time: 1736  Rhythm/Rate: NSR, 97  P waves and WV: 1st degree AV block  QRS, axis: RBBB, LAFB    ST and T waves: RBBB     Interpreted Contemporaneously by me, independently viewed and unchanged compared to prior 1012/18        PROGRESS AND CONSULTS       1732  IVF 1,000, Morphine, Zofran, CXR, Pro-INR, Troponin, Lipase, UA, Blood culture, Lactic acid, Pulse Ox, EKG, and labs ordered.     1920  CT Abd Pelvis ordered.     2003  Rechecked pt. Pt is resting comfortably. Notified pt of unremarkable lab results. Discussed the plan to do CT Abd Pelvis. Pt understands and agrees with the plan, all questions answered.    2144  Rechecked pt. Pt is resting comfortably. Notified pt of no infection, no pneumonia found in CXR, unremarkable UA, no UTI found. Discussed the plan to discharge pt home with [place]. Pt understands and agrees with the plan, all questions answered.          MEDICAL DECISION MAKING  Results were reviewed/discussed with the patient and they were also made aware of online access. Pt also made aware that some labs, such as cultures, will not be resulted during ER visit and follow up with PMD is necessary.     [facility]  Number  of Diagnoses or Management Options  Ankylosing spondylitis of lumbar region (CMS/HCC):   Left sided abdominal pain:      Amount and/or Complexity of Data Reviewed  Clinical lab tests: ordered and reviewed (Lipase= 31, Lactate= 0.7, WBC= 6.11)  Tests in the radiology section of CPT®: ordered and reviewed (CT Abd Pelvis- shows small R pleural effusion with atelectasis, mild colonic diverticulosis  CXR- shows no change since 10/11/18)  Tests in the medicine section of CPT®: ordered and reviewed (See EKG Procedure note. )  Decide to obtain previous medical records or to obtain history from someone other than the patient: yes  Obtain history from someone other than the patient: yes (Pt's family. )  Review and summarize past medical records: yes (Pt discharged yesterday with HCAP along with cystitis. Pt had CT Abd 5 days ago that showed constipation. )  Independent visualization of images, tracings, or specimens: yes           DIAGNOSIS  Final diagnoses:   Left sided abdominal pain   Ankylosing spondylitis of lumbar region (CMS/HCC)       DISPOSITION  DISCHARGE    Patient discharged in stable condition.    Reviewed implications of results, diagnosis, meds, responsibility to follow up, warning signs and symptoms of possible worsening, potential complications and reasons to return to ER.    Patient/Family voiced understanding of above instructions.    Discussed plan for discharge, as there is no emergent indication for admission. Patient referred to primary care provider for BP management due to today's BP. Pt/family is agreeable and understands need for follow up and repeat testing.  Pt is aware that discharge does not mean that nothing is wrong but it indicates no emergency is present that requires admission and they must continue care with follow-up as given below or physician of their choice.     FOLLOW-UP  Suman Jaimes III, MD  [address] [place], [place] [id number] [phone]      As needed         Medication  List      New Prescriptions    HYDROcodone-acetaminophen 5-325 MG per tablet  Commonly known as:  NORCO  Take 1 tablet by mouth Every 8 (Eight) Hours As Needed for Moderate Pain .              Latest Documented Vital Signs:  As of 9:50 PM  BP- 159/87 HR- 93 Temp- 99.9 °F (37.7 °C) (Oral) O2 sat- 99%    --  Documentation assistance provided by dirk Akhtar for Dr. Schmidt.  Information recorded by the scribe was done at my direction and has been verified and validated by me.               Claudia Olivia  10/15/18 [phone]       Oswaldo Schmidt MD  10/15/18 5122

## 2018-10-15 NOTE — PROGRESS NOTES
Case Management Discharge Note    Final Note: DC to skilled bed at [place]. Soha Kovacs RN    Destination     No service has been selected for the patient.      Durable Medical Equipment     No service has been selected for the patient.      Dialysis/Infusion     No service has been selected for the patient.      Home Medical Care     No service has been selected for the patient.      Social Care     No service has been selected for the patient.        Ambulance: [facility]  Other:  (private auto)    Final Discharge Disposition Code: 03 - skilled nursing facility (SNF)

## 2018-10-16 VITALS
TEMPERATURE: 99.9 F | OXYGEN SATURATION: 98 % | BODY MASS INDEX: 21.62 KG/M2 | SYSTOLIC BLOOD PRESSURE: 136 MMHG | WEIGHT: 146 LBS | RESPIRATION RATE: 18 BRPM | HEART RATE: 90 BPM | DIASTOLIC BLOOD PRESSURE: 83 MMHG | HEIGHT: 69 IN

## 2018-10-16 NOTE — DISCHARGE INSTRUCTIONS
Take norco as needed for pain and continue home medications.  We can find no cause for your pain today.  It may be referred pain from your back. Please return to the ED if symptoms worsen with fever or trouble breathing.

## 2018-10-20 LAB
BACTERIA SPEC AEROBE CULT: NORMAL
BACTERIA SPEC AEROBE CULT: NORMAL

## 2018-10-21 ENCOUNTER — HOSPITAL ENCOUNTER (EMERGENCY)
Facility: HOSPITAL | Age: 83
Discharge: HOME | End: 2018-10-21
Attending: EMERGENCY MEDICINE | Admitting: EMERGENCY MEDICINE

## 2018-10-21 VITALS
OXYGEN SATURATION: 92 % | SYSTOLIC BLOOD PRESSURE: 143 MMHG | HEIGHT: 65 IN | BODY MASS INDEX: 25.49 KG/M2 | TEMPERATURE: 98.2 F | RESPIRATION RATE: 18 BRPM | DIASTOLIC BLOOD PRESSURE: 78 MMHG | WEIGHT: 153 LBS | HEART RATE: 73 BPM

## 2018-10-21 DIAGNOSIS — Z43.0 ACUTE TRACHEOSTOMY MANAGEMENT (HCC): Primary | ICD-10-CM

## 2018-10-21 PROCEDURE — 94799 UNLISTED PULMONARY SVC/PX: CPT

## 2018-10-21 PROCEDURE — 99284 EMERGENCY DEPT VISIT MOD MDM: CPT

## 2018-10-21 NOTE — ED NOTES
Called report back to Willie RN at [facility] Chely Narayan, RN  10/21/18 1757

## 2018-10-21 NOTE — ED NOTES
RN introduced self, helped respiratory find supplies for trach replacement.      Altagracia Denton, RN  10/21/18 5135

## 2018-10-21 NOTE — ED PROVIDER NOTES
EMERGENCY DEPARTMENT ENCOUNTER    CHIEF COMPLAINT  Chief Complaint: tracheostomy replacement  History given by: nursing home paperwork  History limited by: Pt non-verbal  Room Number: 24/24  PMD: Suman Jaimes III, MD      HPI:  Pt is a 88 y.o. male who presents after he pulled his tracheostomy out earlier today. He is not distressed. History is limited because Pt is nonverbal but he is alert and will nod yes or no. Per Pt's last admission, Dr. Vilchis noted that he planned  to use a size 6 shiley uncuffed tracheostomy. We will replace his tracheostomy with this type.    Duration:  Just prior to arrival  Onset: sudden  Timing: constant  Location: throat  Radiation: n/a  Quality: n/a  Intensity/Severity: moderate  Progression: unchanged  Associated Symptoms: unknown  Aggravating Factors: unknown  Alleviating Factors: n/a  Previous Episodes: unknown  Treatment before arrival: none    PAST MEDICAL HISTORY  Active Ambulatory Problems     Diagnosis Date Noted   • Chest pain (non-cardiac) 12/29/2017   • Atrial fibrillation (CMS/[facility]) 12/29/2017   • C. difficile diarrhea 12/29/2017   • Coronary artery disease 12/29/2017   • Diabetes mellitus (CMS/[facility]) 12/29/2017   • Disease of thyroid gland 12/29/2017   • Hypertension 12/29/2017   • Pressure ulcer of sacral region 12/29/2017   • Ankylosing spondylitis lumbar region (CMS/[facility]) 12/29/2017   • Neurogenic bladder due to ankylosing spondylitis 12/29/2017   • Tracheostomy in place (CMS/[facility]) 12/29/2017   • PEG (percutaneous endoscopic gastrostomy) status (CMS/[facility]) 12/29/2017   • Acute UTI 01/19/2018   • Sepsis (CMS/[facility]) 01/19/2018   • Clostridium difficile colitis 01/22/2018   • Anticoagulated 01/31/2018   • Closed compression fracture of L1 lumbar vertebra (CMS/[facility]) 09/14/2018   • Tracheostomy status (CMS/[facility]) 09/15/2018   • Seizures (CMS/[facility]) 09/15/2018   • PAF (paroxysmal atrial fibrillation) (CMS/[facility]) 09/15/2018   • Neuromuscular dysfunction of bladder 09/15/2018   •  Hypothyroidism 09/15/2018   • Gastrostomy tube dependent (CMS/[facility]) 09/15/2018   • COPD (chronic obstructive pulmonary disease) (CMS/[facility]) 09/15/2018   • CAD (coronary artery disease) 09/15/2018   • AV block, 1st degree 09/15/2018   • Bifascicular block 09/15/2018   • Macrocytic anemia 09/15/2018   • Diabetes type 2, controlled (CMS/[facility]) 09/15/2018   • History of Clostridium difficile colitis 09/15/2018   • Anticoagulated 09/15/2018   • Healthcare-associated pneumonia 10/10/2018   • Chronic indwelling Chandler catheter 10/10/2018   • Cystitis 10/10/2018     Resolved Ambulatory Problems     Diagnosis Date Noted   • Hyperkalemia 09/15/2018     Past Medical History:   Diagnosis Date   • Ankylosing spondylitis lumbar region (CMS/[facility])    • BPH (benign prostatic hyperplasia)    • C. difficile diarrhea    • Constipation    • Coronary artery disease    • Diabetes mellitus (CMS/[facility])    • Dysphagia    • Hyperlipidemia    • Hypertension    • Hypothyroidism    • MRSA infection    • Neuromuscular dysfunction of bladder    • PAF (paroxysmal atrial fibrillation) (CMS/[facility])    • Pain    • Pressure ulcer of sacral region    • Seizures (CMS/[facility])    • Tracheostomy status (CMS/[facility])        PAST SURGICAL HISTORY  Past Surgical History:   Procedure Laterality Date   • CARDIAC SURGERY     • CORONARY ARTERY BYPASS GRAFT      x 5   • GTUBE INSERTION     • LUMBAR DISCECTOMY FUSION INSTRUMENTATION N/A 9/17/2018    Procedure: T12-L2 POSTERIOR ARTHODESIS FUSION WITH [employer] NAVIGATION;  Surgeon: Jose Daniel Tyler MD;  Location: [facility]  Service: Neurosurgery   • TRACHEOSTOMY         FAMILY HISTORY  No family history on file.    SOCIAL HISTORY  Social History     Social History   • Marital status:      Spouse name: N/A   • Number of children: N/A   • Years of education: N/A     Occupational History   • Not on file.     Social History Main Topics   • Smoking status: Former Smoker   • Smokeless tobacco: Never Used   • Alcohol use No    • Drug use: No   • Sexual activity: Defer     Other Topics Concern   • Not on file     Social History Narrative   • No narrative on file       ALLERGIES  Patient has no known allergies.    REVIEW OF SYSTEMS  Review of Systems   Unable to perform ROS: Patient nonverbal   Constitutional:        Tracheostomy tube removed       PHYSICAL EXAM  ED Triage Vitals [10/21/18 1035]   Temp Heart Rate Resp BP SpO2   98.2 °F (36.8 °C) 96 18 149/82 96 %      Temp src Heart Rate Source Patient Position BP Location FiO2 (%)   Oral -- -- -- --       Physical Exam   Constitutional: He is oriented to person, place, and time. No distress.   HENT:   Head: Normocephalic and atraumatic.   Tracheostomy falling out   Eyes: Pupils are equal, round, and reactive to light. EOM are normal.   Neck: Normal range of motion. Neck supple.   Cardiovascular: Normal rate, regular rhythm and normal heart sounds.    Pulmonary/Chest: Effort normal and breath sounds normal. No respiratory distress.   Abdominal: Soft. There is no tenderness. There is no rebound and no guarding.   Musculoskeletal: Normal range of motion. He exhibits no edema.   No pedal edema   Neurological: He is alert and oriented to person, place, and time. He has normal sensation and normal strength.   Skin: Skin is warm and dry.   Psychiatric: Mood and affect normal.   Nursing note and vitals reviewed.        PROCEDURES  Procedures      PROGRESS AND CONSULTS     1140  Replaced Pt's tracheostomy with a shiley size 6 uncuffed tracheostomy. There were no complication with the procedure and it was replaced easily on the first attempt. Secretions were subsequently suctioned from patient's tracheostomy.     1145  Pt is now able to talk minimally to the respiratory therapists after they placed a speaking valve on the tracheostomy. They informed me that Pt stated that he pulled the tube out this morning because he no longer wants it.     1153  Rechecked Pt who is resting comfortably. Informed  Pt that we cannot remove his tracheostomy and that I will contact his specialist. [facility] will need to talk to his specialist and make an apt to discuss downsizing his tracheostomy over time. I informed him that pulling it out at this point will cause problems as it heals. He can be discharged at this time.       MEDICAL DECISION MAKING  Results were reviewed/discussed with the patient and they were also made aware of online access. Pt also made aware that some labs, such as cultures, will not be resulted during ER visit and follow up with PMD is necessary.     MDM  Number of Diagnoses or Management Options     Amount and/or Complexity of Data Reviewed  Decide to obtain previous medical records or to obtain history from someone other than the patient: yes  Obtain history from someone other than the patient: yes (Nursing home notes, medical record.)  Review and summarize past medical records: yes (Pt was here from 10/10/18-10/14/18 with pneumonia. His cultures grew pseudomonis and is taking ceftazidine for this. Per Pt's last admission, Dr. Vilchis's plan was to use a size 6 shiley uncuffed tracheostomy. We will replace his tracheostomy with this type.)           DIAGNOSIS  Final diagnoses:   Acute tracheostomy management (CMS/[facility])       DISPOSITION  DISCHARGE    Patient discharged in stable condition.    Reviewed implications of results, diagnosis, meds, responsibility to follow up, warning signs and symptoms of possible worsening, potential complications and reasons to return to ER, including new or worsening symptoms.    Patient/Family voiced understanding of above instructions.    Discussed plan for discharge, as there is no emergent indication for admission. Patient referred to primary care provider for BP management due to today's BP. Pt/family is agreeable and understands need for follow up and repeat testing.  Pt is aware that discharge does not mean that nothing is wrong but it indicates no emergency  is present that requires admission and they must continue care with follow-up as given below or physician of their choice.     FOLLOW-UP  Handy Vilchis MD  [id number] [place] [address]  [phone]    Schedule an appointment as soon as possible for a visit            Medication List      No changes were made to your prescriptions during this visit.           Latest Documented Vital Signs:  As of 11:57 AM  BP- 149/82 HR- 91 Temp- 98.2 °F (36.8 °C) (Oral) O2 sat- 98%    --  Documentation assistance provided by dirk Villarreal for Dr. Ahn.  Information recorded by the scribe was done at my direction and has been verified and validated by me.     Desiree Villarreal  10/21/18 4253       Aaron Ahn MD  10/21/18 1177

## 2018-10-21 NOTE — ED TRIAGE NOTES
"[facility] called for pt transport. \"May take a couple of hours,\" is all this RN could gather for ETA  "

## 2022-12-15 NOTE — PROGRESS NOTES
Approximately 20-25 minutes spent with patient in addition to Pulmicort treatment-setting up aerosol properly,gathering supplies and addressing orders.   Response To Light: no significant findings Wood's Lamp Text: A Wood's Lamp was used to illuminate areas of the skin with a black light. The light was held over the suspected areas in a darkened room. Detail Level: Zone
